# Patient Record
Sex: FEMALE | Race: WHITE | NOT HISPANIC OR LATINO | Employment: UNEMPLOYED | URBAN - NONMETROPOLITAN AREA
[De-identification: names, ages, dates, MRNs, and addresses within clinical notes are randomized per-mention and may not be internally consistent; named-entity substitution may affect disease eponyms.]

---

## 2017-02-08 ENCOUNTER — APPOINTMENT (OUTPATIENT)
Dept: GENERAL RADIOLOGY | Facility: HOSPITAL | Age: 32
End: 2017-02-08

## 2017-02-08 ENCOUNTER — HOSPITAL ENCOUNTER (EMERGENCY)
Facility: HOSPITAL | Age: 32
Discharge: HOME | End: 2017-02-08
Attending: EMERGENCY MEDICINE | Admitting: EMERGENCY MEDICINE

## 2017-02-08 VITALS
HEIGHT: 66 IN | HEART RATE: 77 BPM | SYSTOLIC BLOOD PRESSURE: 122 MMHG | TEMPERATURE: 98 F | RESPIRATION RATE: 18 BRPM | DIASTOLIC BLOOD PRESSURE: 81 MMHG | OXYGEN SATURATION: 99 % | BODY MASS INDEX: 28.93 KG/M2 | WEIGHT: 180 LBS

## 2017-02-08 DIAGNOSIS — S70.02XA CONTUSION OF LEFT HIP, INITIAL ENCOUNTER: Primary | ICD-10-CM

## 2017-02-08 DIAGNOSIS — S80.00XA CONTUSION OF KNEE, UNSPECIFIED LATERALITY, INITIAL ENCOUNTER: ICD-10-CM

## 2017-02-08 LAB — B-HCG UR QL: NEGATIVE

## 2017-02-08 PROCEDURE — 81025 URINE PREGNANCY TEST: CPT | Performed by: PHYSICIAN ASSISTANT

## 2017-02-08 PROCEDURE — 99283 EMERGENCY DEPT VISIT LOW MDM: CPT

## 2017-02-08 PROCEDURE — 73562 X-RAY EXAM OF KNEE 3: CPT

## 2017-02-08 PROCEDURE — 73562 X-RAY EXAM OF KNEE 3: CPT | Performed by: RADIOLOGY

## 2017-02-08 PROCEDURE — 73502 X-RAY EXAM HIP UNI 2-3 VIEWS: CPT

## 2017-02-08 PROCEDURE — 73502 X-RAY EXAM HIP UNI 2-3 VIEWS: CPT | Performed by: RADIOLOGY

## 2017-02-08 RX ORDER — NAPROXEN 500 MG/1
500 TABLET ORAL 2 TIMES DAILY PRN
Qty: 12 TABLET | Refills: 0 | Status: SHIPPED | OUTPATIENT
Start: 2017-02-08 | End: 2018-03-19

## 2017-02-08 NOTE — ED PROVIDER NOTES
Subjective   Patient is a 31 y.o. female presenting with lower extremity pain.   History provided by:  Patient  Lower Extremity Issue   Location:  Hip and knee  Time since incident:  1 hour  Injury: yes    Mechanism of injury: fall    Hip location:  L hip  Knee location:  L knee  Pain details:     Quality:  Dull    Radiates to:  Does not radiate    Severity:  Mild  Associated symptoms: no fever        Review of Systems   Constitutional: Negative for chills and fever.   HENT: Negative for congestion, ear pain and sore throat.    Respiratory: Negative for cough, shortness of breath and wheezing.    Cardiovascular: Negative for chest pain.   Gastrointestinal: Negative for diarrhea, nausea and vomiting.   Genitourinary: Negative for dysuria and flank pain.   Musculoskeletal: Positive for arthralgias.   Skin: Negative for rash.   Neurological: Negative for headaches.   Psychiatric/Behavioral: The patient is not nervous/anxious.    All other systems reviewed and are negative.      Past Medical History   Diagnosis Date   • Anxiety    • Depression    • Hepatitis C    • Myalgia        Allergies   Allergen Reactions   • Adhesive Tape      Steri Strip Adhesive Allergy    • Bactrim [Sulfamethoxazole-Trimethoprim] Hives, Itching and Swelling   • Codeine Hives, Itching and Swelling   • Keflex [Cephalexin] Hives, Itching and Swelling       Past Surgical History   Procedure Laterality Date   • Nerve surgery Right    • Cholecystectomy         No family history on file.    Social History     Social History   • Marital status: Single     Spouse name: N/A   • Number of children: N/A   • Years of education: N/A     Social History Main Topics   • Smoking status: Current Every Day Smoker     Packs/day: 1.00     Years: 15.00     Types: Cigarettes   • Smokeless tobacco: Not on file   • Alcohol use No   • Drug use: No   • Sexual activity: Not on file     Other Topics Concern   • Not on file     Social History Narrative           Objective    Physical Exam   Constitutional: She is oriented to person, place, and time. She appears well-developed and well-nourished.   HENT:   Head: Normocephalic.   Mouth/Throat: Oropharynx is clear and moist.   Neck: Neck supple.   Cardiovascular: Normal rate and regular rhythm.    Pulmonary/Chest: Effort normal and breath sounds normal.   Abdominal: Soft. Bowel sounds are normal. There is no tenderness.   Musculoskeletal:        Left hip: She exhibits decreased range of motion, decreased strength and tenderness. She exhibits no deformity.        Left knee: She exhibits decreased range of motion and swelling. She exhibits no deformity.   Neurological: She is alert and oriented to person, place, and time.   Skin: Skin is warm and dry.   Psychiatric: She has a normal mood and affect. Her behavior is normal. Judgment and thought content normal.   Nursing note and vitals reviewed.      Procedures         ED Course  ED Course                  MDM    Final diagnoses:   Contusion of left hip, initial encounter   Contusion of knee, unspecified laterality, initial encounter            EDWARD Price  02/09/17 0886

## 2017-02-26 ENCOUNTER — HOSPITAL ENCOUNTER (EMERGENCY)
Facility: HOSPITAL | Age: 32
Discharge: HOME | End: 2017-02-27
Attending: EMERGENCY MEDICINE | Admitting: EMERGENCY MEDICINE

## 2017-02-26 ENCOUNTER — APPOINTMENT (OUTPATIENT)
Dept: CT IMAGING | Facility: HOSPITAL | Age: 32
End: 2017-02-26

## 2017-02-26 DIAGNOSIS — S09.93XA FACIAL INJURY, INITIAL ENCOUNTER: Primary | ICD-10-CM

## 2017-02-26 DIAGNOSIS — S39.92XA LOWER BACK INJURY, INITIAL ENCOUNTER: ICD-10-CM

## 2017-02-26 PROCEDURE — 72131 CT LUMBAR SPINE W/O DYE: CPT

## 2017-02-26 PROCEDURE — 72131 CT LUMBAR SPINE W/O DYE: CPT | Performed by: RADIOLOGY

## 2017-02-26 PROCEDURE — 99283 EMERGENCY DEPT VISIT LOW MDM: CPT

## 2017-02-26 PROCEDURE — 70486 CT MAXILLOFACIAL W/O DYE: CPT

## 2017-02-26 PROCEDURE — 70486 CT MAXILLOFACIAL W/O DYE: CPT | Performed by: RADIOLOGY

## 2017-02-27 VITALS
RESPIRATION RATE: 16 BRPM | BODY MASS INDEX: 32.14 KG/M2 | HEIGHT: 66 IN | TEMPERATURE: 97.5 F | SYSTOLIC BLOOD PRESSURE: 108 MMHG | HEART RATE: 70 BPM | DIASTOLIC BLOOD PRESSURE: 74 MMHG | OXYGEN SATURATION: 99 % | WEIGHT: 200 LBS

## 2017-03-18 ENCOUNTER — APPOINTMENT (OUTPATIENT)
Dept: CT IMAGING | Facility: HOSPITAL | Age: 32
End: 2017-03-18

## 2017-03-18 ENCOUNTER — HOSPITAL ENCOUNTER (EMERGENCY)
Facility: HOSPITAL | Age: 32
Discharge: HOME | End: 2017-03-18
Attending: EMERGENCY MEDICINE | Admitting: EMERGENCY MEDICINE

## 2017-03-18 VITALS
HEIGHT: 67 IN | RESPIRATION RATE: 18 BRPM | TEMPERATURE: 98.2 F | DIASTOLIC BLOOD PRESSURE: 80 MMHG | WEIGHT: 180 LBS | BODY MASS INDEX: 28.25 KG/M2 | SYSTOLIC BLOOD PRESSURE: 124 MMHG | HEART RATE: 90 BPM | OXYGEN SATURATION: 98 %

## 2017-03-18 DIAGNOSIS — M54.6 ACUTE MIDLINE THORACIC BACK PAIN: Primary | ICD-10-CM

## 2017-03-18 LAB — B-HCG UR QL: NEGATIVE

## 2017-03-18 PROCEDURE — 99283 EMERGENCY DEPT VISIT LOW MDM: CPT

## 2017-03-18 PROCEDURE — 81025 URINE PREGNANCY TEST: CPT | Performed by: PHYSICIAN ASSISTANT

## 2017-03-18 PROCEDURE — 72125 CT NECK SPINE W/O DYE: CPT

## 2017-03-18 PROCEDURE — 72125 CT NECK SPINE W/O DYE: CPT | Performed by: RADIOLOGY

## 2017-03-18 PROCEDURE — 72128 CT CHEST SPINE W/O DYE: CPT

## 2017-03-18 PROCEDURE — 72128 CT CHEST SPINE W/O DYE: CPT | Performed by: RADIOLOGY

## 2017-03-18 RX ORDER — IBUPROFEN 400 MG/1
800 TABLET ORAL ONCE
Status: COMPLETED | OUTPATIENT
Start: 2017-03-18 | End: 2017-03-18

## 2017-03-18 RX ADMIN — IBUPROFEN 800 MG: 400 TABLET ORAL at 03:10

## 2017-03-18 NOTE — ED PROVIDER NOTES
Subjective   Patient is a 31 y.o. female presenting with back pain.   History provided by:  Patient   used: No    Back Pain   Location:  Thoracic spine  Quality:  Stabbing  Radiates to:  Does not radiate  Pain severity:  Moderate  Onset quality:  Sudden  Duration:  1 day  Timing:  Constant  Progression:  Worsening  Chronicity:  New  Context: falling    Context comment:  Patient stated she fell getting out of the bath tub earlier and landed on the tub with her T spine. Reports she has a known fracture of the T spine.  Patient stated she tried to sleep off the pain but woke up hurting worse.   Relieved by:  Nothing  Worsened by:  Nothing  Ineffective treatments:  None tried  Associated symptoms: no abdominal pain, no abdominal swelling, no bladder incontinence, no bowel incontinence, no chest pain, no dysuria, no fever, no headaches, no leg pain, no numbness, no paresthesias, no pelvic pain, no perianal numbness, no tingling, no weakness and no weight loss    Risk factors: no hx of cancer, no hx of osteoporosis, no lack of exercise, no menopause, not obese, not pregnant, no recent surgery, no steroid use and no vascular disease        Review of Systems   Constitutional: Negative.  Negative for fever and weight loss.   HENT: Negative.    Eyes: Negative.    Respiratory: Negative.    Cardiovascular: Negative.  Negative for chest pain.   Gastrointestinal: Negative.  Negative for abdominal pain and bowel incontinence.   Endocrine: Negative.    Genitourinary: Negative.  Negative for bladder incontinence, dysuria and pelvic pain.   Musculoskeletal: Positive for back pain.   Skin: Negative.    Allergic/Immunologic: Negative.    Neurological: Negative.  Negative for tingling, weakness, numbness, headaches and paresthesias.   Hematological: Negative.    Psychiatric/Behavioral: Negative.    All other systems reviewed and are negative.      Past Medical History   Diagnosis Date   • Anxiety    • Depression    •  Hepatitis C    • Myalgia        Allergies   Allergen Reactions   • Adhesive Tape      Steri Strip Adhesive Allergy    • Bactrim [Sulfamethoxazole-Trimethoprim] Hives, Itching and Swelling   • Codeine Hives, Itching and Swelling   • Keflex [Cephalexin] Hives, Itching and Swelling       Past Surgical History   Procedure Laterality Date   • Nerve surgery Right    • Cholecystectomy         History reviewed. No pertinent family history.    Social History     Social History   • Marital status: Single     Spouse name: N/A   • Number of children: N/A   • Years of education: N/A     Social History Main Topics   • Smoking status: Current Every Day Smoker     Packs/day: 1.00     Years: 15.00     Types: Cigarettes   • Smokeless tobacco: None   • Alcohol use No   • Drug use: No   • Sexual activity: Defer     Other Topics Concern   • None     Social History Narrative   • None           Objective   Physical Exam   Constitutional: She is oriented to person, place, and time. She appears well-developed and well-nourished.   HENT:   Head: Normocephalic and atraumatic.   Right Ear: External ear normal.   Left Ear: External ear normal.   Nose: Nose normal.   Mouth/Throat: Oropharynx is clear and moist.   Eyes: EOM are normal. Pupils are equal, round, and reactive to light.   Neck: Normal range of motion. Neck supple.   Cardiovascular: Normal rate, regular rhythm, normal heart sounds and intact distal pulses.    Pulmonary/Chest: Effort normal and breath sounds normal.   Abdominal: Soft. Bowel sounds are normal.   Musculoskeletal: Normal range of motion.        Cervical back: She exhibits normal range of motion, no tenderness, no bony tenderness, no swelling, no edema, no deformity, no laceration, no pain, no spasm and normal pulse.        Thoracic back: She exhibits tenderness. She exhibits normal range of motion, no bony tenderness, no swelling, no edema, no deformity, no laceration, no pain, no spasm and normal pulse.        Lumbar  back: She exhibits normal range of motion, no tenderness, no bony tenderness, no swelling, no edema, no deformity, no laceration, no pain, no spasm and normal pulse.   Neurological: She is alert and oriented to person, place, and time.   Skin: Skin is warm and dry.   Psychiatric: She has a normal mood and affect. Her behavior is normal. Judgment and thought content normal.   Nursing note and vitals reviewed.      Procedures         ED Course  ED Course                  MDM    Final diagnoses:   Acute midline thoracic back pain            EDWARD Wang  03/18/17 0413

## 2017-03-18 NOTE — ED NOTES
Patient states that she has a fracture of T 11. States that she fell in the shower yesterday afternoon and her back has hurt every since. Patient is ambulatory into room. Patient has no overt deficiencies noted. Resps even and unlabored. ALENA. THEODORA.      Keesha Beauchamp RN  03/18/17 0426

## 2017-03-18 NOTE — ED NOTES
Patient ambulatory in room. States that she has spoken with the PA and aware that she is up for discharge. Patient requesting disc and copy of CT report. Explained to patient that she would have to come to medical records in 48 hours to obtain these records. Patient states that the pain is tolerable at this time.      Keesha Beauchamp RN  03/18/17 0429

## 2017-06-07 ENCOUNTER — HOSPITAL ENCOUNTER (OUTPATIENT)
Dept: GENERAL RADIOLOGY | Facility: HOSPITAL | Age: 32
Discharge: HOME | End: 2017-06-07
Admitting: FAMILY MEDICINE

## 2017-06-07 ENCOUNTER — HOSPITAL ENCOUNTER (OUTPATIENT)
Dept: GENERAL RADIOLOGY | Facility: HOSPITAL | Age: 32
Discharge: HOME | End: 2017-06-07

## 2017-06-07 ENCOUNTER — TRANSCRIBE ORDERS (OUTPATIENT)
Dept: ADMINISTRATIVE | Facility: HOSPITAL | Age: 32
End: 2017-06-07

## 2017-06-07 DIAGNOSIS — M54.9 BACKACHE, UNSPECIFIED: ICD-10-CM

## 2017-06-07 DIAGNOSIS — M54.9 BACKACHE, UNSPECIFIED: Primary | ICD-10-CM

## 2017-06-07 PROCEDURE — 72114 X-RAY EXAM L-S SPINE BENDING: CPT | Performed by: RADIOLOGY

## 2017-06-07 PROCEDURE — 72074 X-RAY EXAM THORAC SPINE4/>VW: CPT

## 2017-06-07 PROCEDURE — 72114 X-RAY EXAM L-S SPINE BENDING: CPT

## 2017-06-07 PROCEDURE — 72072 X-RAY EXAM THORAC SPINE 3VWS: CPT | Performed by: RADIOLOGY

## 2017-07-03 ENCOUNTER — TRANSCRIBE ORDERS (OUTPATIENT)
Dept: ADMINISTRATIVE | Facility: HOSPITAL | Age: 32
End: 2017-07-03

## 2017-07-03 DIAGNOSIS — R00.1 BRADYCARDIA: Primary | ICD-10-CM

## 2017-07-07 ENCOUNTER — HOSPITAL ENCOUNTER (OUTPATIENT)
Dept: CARDIOLOGY | Facility: HOSPITAL | Age: 32
Discharge: HOME | End: 2017-07-07
Admitting: FAMILY MEDICINE

## 2017-07-07 DIAGNOSIS — R00.1 BRADYCARDIA: ICD-10-CM

## 2017-07-07 PROCEDURE — 93306 TTE W/DOPPLER COMPLETE: CPT

## 2017-07-07 PROCEDURE — 93306 TTE W/DOPPLER COMPLETE: CPT | Performed by: INTERNAL MEDICINE

## 2017-07-09 LAB
BH CV ECHO MEAS - ACS: 1.8 CM
BH CV ECHO MEAS - AO ROOT AREA (BSA CORRECTED): 1.2
BH CV ECHO MEAS - AO ROOT AREA: 4.5 CM^2
BH CV ECHO MEAS - AO ROOT DIAM: 2.4 CM
BH CV ECHO MEAS - BSA(HAYCOCK): 2 M^2
BH CV ECHO MEAS - BSA: 1.9 M^2
BH CV ECHO MEAS - BZI_BMI: 29.1 KILOGRAMS/M^2
BH CV ECHO MEAS - BZI_METRIC_HEIGHT: 167.6 CM
BH CV ECHO MEAS - BZI_METRIC_WEIGHT: 81.6 KG
BH CV ECHO MEAS - CONTRAST EF 4CH: 61 ML/M^2
BH CV ECHO MEAS - EDV(CUBED): 138.2 ML
BH CV ECHO MEAS - EDV(MOD-SP4): 123 ML
BH CV ECHO MEAS - EDV(TEICH): 127.8 ML
BH CV ECHO MEAS - EF(CUBED): 68.4 %
BH CV ECHO MEAS - EF(MOD-SP4): 61 %
BH CV ECHO MEAS - EF(TEICH): 59.6 %
BH CV ECHO MEAS - ESV(CUBED): 43.6 ML
BH CV ECHO MEAS - ESV(MOD-SP4): 48 ML
BH CV ECHO MEAS - ESV(TEICH): 51.6 ML
BH CV ECHO MEAS - FS: 31.9 %
BH CV ECHO MEAS - IVS/LVPW: 1.1
BH CV ECHO MEAS - IVSD: 1 CM
BH CV ECHO MEAS - LA DIMENSION: 4.3 CM
BH CV ECHO MEAS - LA/AO: 1.8
BH CV ECHO MEAS - LV DIASTOLIC VOL/BSA (35-75): 64.3 ML/M^2
BH CV ECHO MEAS - LV MASS(C)D: 185.1 GRAMS
BH CV ECHO MEAS - LV MASS(C)DI: 96.8 GRAMS/M^2
BH CV ECHO MEAS - LV SYSTOLIC VOL/BSA (12-30): 25.1 ML/M^2
BH CV ECHO MEAS - LVIDD: 5.2 CM
BH CV ECHO MEAS - LVIDS: 3.5 CM
BH CV ECHO MEAS - LVLD AP4: 8 CM
BH CV ECHO MEAS - LVLS AP4: 6.7 CM
BH CV ECHO MEAS - LVOT AREA (M): 3.1 CM^2
BH CV ECHO MEAS - LVOT AREA: 3.3 CM^2
BH CV ECHO MEAS - LVOT DIAM: 2 CM
BH CV ECHO MEAS - LVPWD: 0.92 CM
BH CV ECHO MEAS - MV A MAX VEL: 30.1 CM/SEC
BH CV ECHO MEAS - MV E MAX VEL: 75.5 CM/SEC
BH CV ECHO MEAS - MV E/A: 2.5
BH CV ECHO MEAS - PA ACC SLOPE: 410.4 CM/SEC^2
BH CV ECHO MEAS - PA ACC TIME: 0.19 SEC
BH CV ECHO MEAS - PA PR(ACCEL): -8.1 MMHG
BH CV ECHO MEAS - RAP SYSTOLE: 10 MMHG
BH CV ECHO MEAS - RVSP: 44.4 MMHG
BH CV ECHO MEAS - SI(CUBED): 49.4 ML/M^2
BH CV ECHO MEAS - SI(MOD-SP4): 39.2 ML/M^2
BH CV ECHO MEAS - SI(TEICH): 39.8 ML/M^2
BH CV ECHO MEAS - SV(CUBED): 94.6 ML
BH CV ECHO MEAS - SV(MOD-SP4): 75 ML
BH CV ECHO MEAS - SV(TEICH): 76.2 ML
BH CV ECHO MEAS - TR MAX VEL: 293.2 CM/SEC

## 2017-09-27 PROBLEM — R93.2 ABNORMAL LIVER DIAGNOSTIC IMAGING: Status: ACTIVE | Noted: 2017-09-27

## 2017-09-27 PROBLEM — B19.20 HCV (HEPATITIS C VIRUS): Status: ACTIVE | Noted: 2017-09-27

## 2017-09-27 PROBLEM — G43.009 ATYPICAL MIGRAINE: Status: ACTIVE | Noted: 2017-09-27

## 2018-03-05 ENCOUNTER — APPOINTMENT (OUTPATIENT)
Dept: GENERAL RADIOLOGY | Facility: HOSPITAL | Age: 33
End: 2018-03-05

## 2018-03-05 ENCOUNTER — HOSPITAL ENCOUNTER (EMERGENCY)
Facility: HOSPITAL | Age: 33
Discharge: HOME | End: 2018-03-05
Attending: EMERGENCY MEDICINE | Admitting: EMERGENCY MEDICINE

## 2018-03-05 VITALS
SYSTOLIC BLOOD PRESSURE: 116 MMHG | RESPIRATION RATE: 18 BRPM | WEIGHT: 180 LBS | TEMPERATURE: 98.2 F | HEART RATE: 88 BPM | BODY MASS INDEX: 28.93 KG/M2 | OXYGEN SATURATION: 95 % | DIASTOLIC BLOOD PRESSURE: 62 MMHG | HEIGHT: 66 IN

## 2018-03-05 DIAGNOSIS — J20.9 ACUTE BRONCHITIS, UNSPECIFIED ORGANISM: ICD-10-CM

## 2018-03-05 DIAGNOSIS — J01.00 ACUTE NON-RECURRENT MAXILLARY SINUSITIS: Primary | ICD-10-CM

## 2018-03-05 PROCEDURE — 63710000001 PREDNISONE PER 1 MG: Performed by: NURSE PRACTITIONER

## 2018-03-05 PROCEDURE — 99283 EMERGENCY DEPT VISIT LOW MDM: CPT

## 2018-03-05 PROCEDURE — 71046 X-RAY EXAM CHEST 2 VIEWS: CPT | Performed by: RADIOLOGY

## 2018-03-05 PROCEDURE — 94799 UNLISTED PULMONARY SVC/PX: CPT

## 2018-03-05 PROCEDURE — 94640 AIRWAY INHALATION TREATMENT: CPT

## 2018-03-05 PROCEDURE — 71046 X-RAY EXAM CHEST 2 VIEWS: CPT

## 2018-03-05 RX ORDER — AMOXICILLIN AND CLAVULANATE POTASSIUM 875; 125 MG/1; MG/1
1 TABLET, FILM COATED ORAL ONCE
Status: COMPLETED | OUTPATIENT
Start: 2018-03-05 | End: 2018-03-05

## 2018-03-05 RX ORDER — PREDNISONE 20 MG/1
20 TABLET ORAL 2 TIMES DAILY
Qty: 10 TABLET | Refills: 0 | Status: SHIPPED | OUTPATIENT
Start: 2018-03-05 | End: 2018-03-10

## 2018-03-05 RX ORDER — IPRATROPIUM BROMIDE AND ALBUTEROL SULFATE 2.5; .5 MG/3ML; MG/3ML
3 SOLUTION RESPIRATORY (INHALATION) ONCE
Status: COMPLETED | OUTPATIENT
Start: 2018-03-05 | End: 2018-03-05

## 2018-03-05 RX ORDER — AMOXICILLIN AND CLAVULANATE POTASSIUM 875; 125 MG/1; MG/1
1 TABLET, FILM COATED ORAL 2 TIMES DAILY
Qty: 20 TABLET | Refills: 0 | Status: SHIPPED | OUTPATIENT
Start: 2018-03-05 | End: 2018-03-15

## 2018-03-05 RX ORDER — FLUVOXAMINE MALEATE 50 MG/1
100 TABLET, COATED ORAL 3 TIMES DAILY
COMMUNITY
End: 2018-03-19

## 2018-03-05 RX ORDER — PREDNISONE 20 MG/1
20 TABLET ORAL ONCE
Status: COMPLETED | OUTPATIENT
Start: 2018-03-05 | End: 2018-03-05

## 2018-03-05 RX ADMIN — PREDNISONE 20 MG: 20 TABLET ORAL at 08:10

## 2018-03-05 RX ADMIN — IPRATROPIUM BROMIDE AND ALBUTEROL SULFATE 3 ML: .5; 3 SOLUTION RESPIRATORY (INHALATION) at 07:38

## 2018-03-05 RX ADMIN — AMOXICILLIN AND CLAVULANATE POTASSIUM 1 TABLET: 875; 125 TABLET, FILM COATED ORAL at 08:10

## 2018-03-05 NOTE — DISCHARGE INSTRUCTIONS
Acute Bronchitis, Adult  Acute bronchitis is when air tubes (bronchi) in the lungs suddenly get swollen. The condition can make it hard to breathe. It can also cause these symptoms:  · A cough.  · Coughing up clear, yellow, or green mucus.  · Wheezing.  · Chest congestion.  · Shortness of breath.  · A fever.  · Body aches.  · Chills.  · A sore throat.  Follow these instructions at home:  Medicines   · Take over-the-counter and prescription medicines only as told by your doctor.  · If you were prescribed an antibiotic medicine, take it as told by your doctor. Do not stop taking the antibiotic even if you start to feel better.  General instructions   · Rest.  · Drink enough fluids to keep your pee (urine) clear or pale yellow.  · Avoid smoking and secondhand smoke. If you smoke and you need help quitting, ask your doctor. Quitting will help your lungs heal faster.  · Use an inhaler, cool mist vaporizer, or humidifier as told by your doctor.  · Keep all follow-up visits as told by your doctor. This is important.  How is this prevented?  To lower your risk of getting this condition again:  · Wash your hands often with soap and water. If you cannot use soap and water, use hand .  · Avoid contact with people who have cold symptoms.  · Try not to touch your hands to your mouth, nose, or eyes.  · Make sure to get the flu shot every year.  Contact a doctor if:  · Your symptoms do not get better in 2 weeks.  Get help right away if:  · You cough up blood.  · You have chest pain.  · You have very bad shortness of breath.  · You become dehydrated.  · You faint (pass out) or keep feeling like you are going to pass out.  · You keep throwing up (vomiting).  · You have a very bad headache.  · Your fever or chills gets worse.  This information is not intended to replace advice given to you by your health care provider. Make sure you discuss any questions you have with your health care provider.  Document Released: 06/05/2009  Document Revised: 07/26/2017 Document Reviewed: 06/07/2017  [employer] Interactive Patient Education © 2017 [employer]

## 2018-03-05 NOTE — ED PROVIDER NOTES
Subjective   Patient is a 32 y.o. female presenting with cough.   History provided by:  Patient   used: No    Cough   Cough characteristics:  Productive and hacking  Sputum characteristics:  Green  Severity:  Moderate  Onset quality:  Gradual  Duration:  5 days  Timing:  Intermittent  Progression:  Waxing and waning  Chronicity:  New  Smoker: yes    Context: sick contacts, upper respiratory infection and weather changes    Context: not animal exposure    Relieved by:  Nothing  Worsened by:  Nothing  Ineffective treatments:  None tried  Associated symptoms: shortness of breath, sinus congestion, sore throat and wheezing    Associated symptoms: no chest pain, no chills and no fever    Risk factors: no chemical exposure, no recent infection and no recent travel        Review of Systems   Constitutional: Negative.  Negative for chills and fever.   HENT: Positive for sore throat.    Respiratory: Positive for cough, shortness of breath and wheezing.    Cardiovascular: Negative.  Negative for chest pain.   Gastrointestinal: Negative.  Negative for abdominal pain.   Endocrine: Negative.    Genitourinary: Negative.  Negative for dysuria.   Skin: Negative.    Neurological: Negative.    Psychiatric/Behavioral: Negative.    All other systems reviewed and are negative.      Past Medical History:   Diagnosis Date   • Bradycardia    • Fibromyalgia    • Heart disease    • Hepatitis C    • Myalgia        Allergies   Allergen Reactions   • Adhesive Tape      Steri Strip Adhesive Allergy    • Bactrim [Sulfamethoxazole-Trimethoprim] Hives, Itching and Swelling   • Codeine Hives, Itching and Swelling   • Keflex [Cephalexin] Hives, Itching and Swelling       Past Surgical History:   Procedure Laterality Date   • CHOLECYSTECTOMY     • NERVE SURGERY Right        History reviewed. No pertinent family history.    Social History     Social History   • Marital status: Single     Social History Main Topics   • Smoking  status: Current Every Day Smoker     Packs/day: 1.00     Years: 15.00     Types: Cigarettes   • Alcohol use No   • Drug use: No   • Sexual activity: Defer           Objective   Physical Exam   Constitutional: She is oriented to person, place, and time. She appears well-developed and well-nourished.   HENT:   Head: Normocephalic and atraumatic.   Nose: Nose normal.   Maxillary sinus tenderness   Eyes: EOM are normal. Pupils are equal, round, and reactive to light.   Neck: Normal range of motion. Neck supple.   Cardiovascular: Normal rate, regular rhythm, normal heart sounds and intact distal pulses.    Pulmonary/Chest: Effort normal. No respiratory distress. She has wheezes.   Abdominal: Soft. Bowel sounds are normal.   Musculoskeletal: Normal range of motion.   Neurological: She is alert and oriented to person, place, and time.   Skin: Skin is warm and dry.   Psychiatric: She has a normal mood and affect. Her behavior is normal. Judgment and thought content normal.   Nursing note and vitals reviewed.      Procedures         ED Course  ED Course                  MDM  Number of Diagnoses or Management Options  Acute bronchitis, unspecified organism: new and requires workup  Acute non-recurrent maxillary sinusitis: new and requires workup     Amount and/or Complexity of Data Reviewed  Tests in the radiology section of CPT®: reviewed and ordered    Risk of Complications, Morbidity, and/or Mortality  Presenting problems: moderate  Diagnostic procedures: moderate  Management options: moderate    Patient Progress  Patient progress: improved      Final diagnoses:   Acute non-recurrent maxillary sinusitis   Acute bronchitis, unspecified organism            Mary Mclean, APRN  03/05/18 0826

## 2018-03-08 ENCOUNTER — HOSPITAL ENCOUNTER (EMERGENCY)
Facility: HOSPITAL | Age: 33
Discharge: LEFT BEFORE DISCHARGE | End: 2018-03-08
Attending: EMERGENCY MEDICINE | Admitting: EMERGENCY MEDICINE

## 2018-03-08 ENCOUNTER — APPOINTMENT (OUTPATIENT)
Dept: CT IMAGING | Facility: HOSPITAL | Age: 33
End: 2018-03-08

## 2018-03-08 VITALS
DIASTOLIC BLOOD PRESSURE: 78 MMHG | HEART RATE: 103 BPM | RESPIRATION RATE: 18 BRPM | HEIGHT: 66 IN | TEMPERATURE: 97.6 F | BODY MASS INDEX: 28.93 KG/M2 | OXYGEN SATURATION: 95 % | WEIGHT: 180 LBS | SYSTOLIC BLOOD PRESSURE: 117 MMHG

## 2018-03-08 DIAGNOSIS — F32.A DEPRESSION, UNSPECIFIED DEPRESSION TYPE: Primary | ICD-10-CM

## 2018-03-08 DIAGNOSIS — S61.451A BITE WOUND OF RIGHT HAND, INITIAL ENCOUNTER: ICD-10-CM

## 2018-03-08 LAB
6-ACETYL MORPHINE: NEGATIVE
ALBUMIN SERPL-MCNC: 4.6 G/DL (ref 3.5–5)
ALBUMIN/GLOB SERPL: 1.4 G/DL (ref 1.5–2.5)
ALP SERPL-CCNC: 76 U/L (ref 35–104)
ALT SERPL W P-5'-P-CCNC: 49 U/L (ref 10–36)
AMPHET+METHAMPHET UR QL: NEGATIVE
ANION GAP SERPL CALCULATED.3IONS-SCNC: 8.6 MMOL/L (ref 3.6–11.2)
AST SERPL-CCNC: 40 U/L (ref 10–30)
B-HCG UR QL: NEGATIVE
BARBITURATES UR QL SCN: NEGATIVE
BASOPHILS # BLD AUTO: 0.07 10*3/MM3 (ref 0–0.3)
BASOPHILS NFR BLD AUTO: 0.8 % (ref 0–2)
BENZODIAZ UR QL SCN: NEGATIVE
BILIRUB SERPL-MCNC: 0.1 MG/DL (ref 0.2–1.8)
BILIRUB UR QL STRIP: NEGATIVE
BUN BLD-MCNC: 7 MG/DL (ref 7–21)
BUN/CREAT SERPL: 8.8 (ref 7–25)
BUPRENORPHINE SERPL-MCNC: NEGATIVE NG/ML
CALCIUM SPEC-SCNC: 9.7 MG/DL (ref 7.7–10)
CANNABINOIDS SERPL QL: NEGATIVE
CHLORIDE SERPL-SCNC: 108 MMOL/L (ref 99–112)
CLARITY UR: CLEAR
CO2 SERPL-SCNC: 26.4 MMOL/L (ref 24.3–31.9)
COCAINE UR QL: NEGATIVE
COLOR UR: YELLOW
CREAT BLD-MCNC: 0.8 MG/DL (ref 0.43–1.29)
DEPRECATED RDW RBC AUTO: 42.1 FL (ref 37–54)
EOSINOPHIL # BLD AUTO: 0.42 10*3/MM3 (ref 0–0.7)
EOSINOPHIL NFR BLD AUTO: 4.9 % (ref 0–5)
ERYTHROCYTE [DISTWIDTH] IN BLOOD BY AUTOMATED COUNT: 12.9 % (ref 11.5–14.5)
ETHANOL BLD-MCNC: 195 MG/DL
ETHANOL UR QL: 0.2 %
GFR SERPL CREATININE-BSD FRML MDRD: 84 ML/MIN/1.73
GLOBULIN UR ELPH-MCNC: 3.2 GM/DL
GLUCOSE BLD-MCNC: 112 MG/DL (ref 70–110)
GLUCOSE UR STRIP-MCNC: NEGATIVE MG/DL
HCT VFR BLD AUTO: 42.8 % (ref 37–47)
HGB BLD-MCNC: 14.1 G/DL (ref 12–16)
HGB UR QL STRIP.AUTO: NEGATIVE
IMM GRANULOCYTES # BLD: 0.03 10*3/MM3 (ref 0–0.03)
IMM GRANULOCYTES NFR BLD: 0.4 % (ref 0–0.5)
KETONES UR QL STRIP: NEGATIVE
LEUKOCYTE ESTERASE UR QL STRIP.AUTO: NEGATIVE
LYMPHOCYTES # BLD AUTO: 2.33 10*3/MM3 (ref 1–3)
LYMPHOCYTES NFR BLD AUTO: 27.4 % (ref 21–51)
MAGNESIUM SERPL-MCNC: 2.1 MG/DL (ref 1.7–2.6)
MCH RBC QN AUTO: 29.7 PG (ref 27–33)
MCHC RBC AUTO-ENTMCNC: 32.9 G/DL (ref 33–37)
MCV RBC AUTO: 90.3 FL (ref 80–94)
METHADONE UR QL SCN: NEGATIVE
MONOCYTES # BLD AUTO: 0.47 10*3/MM3 (ref 0.1–0.9)
MONOCYTES NFR BLD AUTO: 5.5 % (ref 0–10)
NEUTROPHILS # BLD AUTO: 5.18 10*3/MM3 (ref 1.4–6.5)
NEUTROPHILS NFR BLD AUTO: 61 % (ref 30–70)
NITRITE UR QL STRIP: NEGATIVE
OPIATES UR QL: NEGATIVE
OSMOLALITY SERPL CALC.SUM OF ELEC: 283.7 MOSM/KG (ref 273–305)
OXYCODONE UR QL SCN: NEGATIVE
PCP UR QL SCN: NEGATIVE
PH UR STRIP.AUTO: 6.5 [PH] (ref 5–8)
PLATELET # BLD AUTO: 284 10*3/MM3 (ref 130–400)
PMV BLD AUTO: 8.6 FL (ref 6–10)
POTASSIUM BLD-SCNC: 4.3 MMOL/L (ref 3.5–5.3)
PROT SERPL-MCNC: 7.8 G/DL (ref 6–8)
PROT UR QL STRIP: NEGATIVE
RBC # BLD AUTO: 4.74 10*6/MM3 (ref 4.2–5.4)
SODIUM BLD-SCNC: 143 MMOL/L (ref 135–153)
SP GR UR STRIP: <=1.005 (ref 1–1.03)
UROBILINOGEN UR QL STRIP: NORMAL
WBC NRBC COR # BLD: 8.5 10*3/MM3 (ref 4.5–12.5)

## 2018-03-08 PROCEDURE — 80307 DRUG TEST PRSMV CHEM ANLYZR: CPT | Performed by: EMERGENCY MEDICINE

## 2018-03-08 PROCEDURE — 99283 EMERGENCY DEPT VISIT LOW MDM: CPT

## 2018-03-08 PROCEDURE — 83735 ASSAY OF MAGNESIUM: CPT | Performed by: NURSE PRACTITIONER

## 2018-03-08 PROCEDURE — 36415 COLL VENOUS BLD VENIPUNCTURE: CPT

## 2018-03-08 PROCEDURE — 81003 URINALYSIS AUTO W/O SCOPE: CPT | Performed by: EMERGENCY MEDICINE

## 2018-03-08 PROCEDURE — 81025 URINE PREGNANCY TEST: CPT | Performed by: EMERGENCY MEDICINE

## 2018-03-08 PROCEDURE — 80053 COMPREHEN METABOLIC PANEL: CPT | Performed by: EMERGENCY MEDICINE

## 2018-03-08 PROCEDURE — 85025 COMPLETE CBC W/AUTO DIFF WBC: CPT | Performed by: EMERGENCY MEDICINE

## 2018-03-08 RX ORDER — AMOXICILLIN AND CLAVULANATE POTASSIUM 875; 125 MG/1; MG/1
1 TABLET, FILM COATED ORAL ONCE
Status: DISCONTINUED | OUTPATIENT
Start: 2018-03-08 | End: 2018-03-08 | Stop reason: HOSPADM

## 2018-03-08 RX ORDER — SODIUM CHLORIDE 0.9 % (FLUSH) 0.9 %
10 SYRINGE (ML) INJECTION AS NEEDED
Status: DISCONTINUED | OUTPATIENT
Start: 2018-03-08 | End: 2018-03-08
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
## 2018-03-08 NOTE — ED NOTES
Walked patient out to sister car who was upset related to patient wanting to leave AMA. Spoke with patient and sister in relation to follow up with PCP or return to ED if headache persists, nausea or vomiting. Patient becomes lethargic. Also discussed follow up with patient psychiatrist. Patient sister signed AMA paper to take responsibility. Noted to be visually upset related to patient leaving.       Keesha Beauchamp RN  03/08/18 0237       Keesha Beauchamp RN  03/08/18 0359
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
## 2018-03-08 NOTE — ED NOTES
Called and spoke with NP at this time, who states that patient needs to speak to psych access related to self harm. Made aware that patient declines being suicidal, alert and oriented X 4. Resps even and unlabored. PERRLA. States that her head hurts some, but reports that she does want to speak to anyone here, states that she has her own psychiatrist. NP states that patient needs to speak to access here.      Keesha Beauchamp RN  03/08/18 0235
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
## 2018-03-19 ENCOUNTER — OFFICE VISIT (OUTPATIENT)
Dept: NEUROSURGERY | Facility: CLINIC | Age: 33
End: 2018-03-19

## 2018-03-19 VITALS
WEIGHT: 198.4 LBS | SYSTOLIC BLOOD PRESSURE: 120 MMHG | BODY MASS INDEX: 31.88 KG/M2 | DIASTOLIC BLOOD PRESSURE: 82 MMHG | OXYGEN SATURATION: 96 % | HEART RATE: 86 BPM | TEMPERATURE: 99.4 F | HEIGHT: 66 IN

## 2018-03-19 DIAGNOSIS — G89.29 CHRONIC BILATERAL LOW BACK PAIN WITHOUT SCIATICA: Primary | ICD-10-CM

## 2018-03-19 DIAGNOSIS — M47.817 LUMBOSACRAL SPONDYLOSIS WITHOUT MYELOPATHY: ICD-10-CM

## 2018-03-19 DIAGNOSIS — M41.20 SCOLIOSIS (AND KYPHOSCOLIOSIS), IDIOPATHIC: ICD-10-CM

## 2018-03-19 DIAGNOSIS — M54.50 CHRONIC BILATERAL LOW BACK PAIN WITHOUT SCIATICA: Primary | ICD-10-CM

## 2018-03-19 PROCEDURE — 99243 OFF/OP CNSLTJ NEW/EST LOW 30: CPT | Performed by: NEUROLOGICAL SURGERY

## 2018-03-19 RX ORDER — IBUPROFEN 200 MG
200 TABLET ORAL EVERY 6 HOURS PRN
COMMUNITY
End: 2020-10-15

## 2018-03-19 RX ORDER — GABAPENTIN 300 MG/1
CAPSULE ORAL 2 TIMES DAILY
Status: ON HOLD | COMMUNITY
Start: 2018-02-28 | End: 2022-09-09

## 2018-03-19 RX ORDER — ARIPIPRAZOLE 10 MG/1
TABLET ORAL
Status: ON HOLD | COMMUNITY
Start: 2018-03-13 | End: 2022-09-09

## 2018-03-19 RX ORDER — CLONAZEPAM 0.5 MG/1
TABLET ORAL
Status: ON HOLD | COMMUNITY
Start: 2018-03-13 | End: 2022-09-09

## 2018-03-19 NOTE — PROGRESS NOTES
Patient: Lillian Ayon  :  1985  Chart #:  [id number]    Date of Service: 18    Chief Complaint:   Chief Complaint   Patient presents with   • Back Pain       Back Pain   This is a new (Ms. Ayon is new with me today.  She is a 32 year old female who presents today with cervical and back pain.) problem. Episode onset: She was a previous patient of mine from  as she had a right lateral femoral cutaneous nerve transection. Episode frequency: She has more sensation in her right lower extremity than the left.  She has pain when her lower extremities are raised.  The problem has been gradually worsening since onset. The pain is present in the lumbar spine. The quality of the pain is described as aching. Radiates to: She has pain in her left lower extremity. The pain is at a severity of 4/10. The pain is mild (Her pain is mild to moderate.). The pain is worse during the day. The symptoms are aggravated by bending, position and standing. Stiffness is present in the morning. Associated symptoms include headaches and numbness. (She has a long history of back pain from neck to sacrum;  She has never had spine surgery;  ) Risk factors include lack of exercise and sedentary lifestyle. She has tried bed rest, NSAIDs and heat (she has been referred to PT but has not started;  she takes OTC ibuprofen and gabapentin.  she has a TENS unit.) for the symptoms. The treatment provided mild relief.       Radiographic Images:   X-ray of the lumbar spine dated 17 shows she has a sacralized L5 vertebra with 4 non-rib bearing vertebrae.     She has rudimentary ribs at T12.  There is a slight scoliosis concave to the right.  The bones are healthy and not fractures are seen.    Past Medical History:   Diagnosis Date   • Arthritis    • Bradycardia    • Fibromyalgia    • Heart disease    • Hepatitis C    • Myalgia      Current Outpatient Prescriptions   Medication Sig Dispense Refill   • ARIPiprazole (ABILIFY) 10 MG  tablet      • clonazePAM (KlonoPIN) 0.5 MG tablet      • fluconazole (DIFLUCAN) 200 MG tablet Take 1 tablet by mouth daily. 3 tablet 0   • gabapentin (NEURONTIN) 800 MG tablet      • ibuprofen (ADVIL,MOTRIN) 200 MG tablet Take 200 mg by mouth Every 6 (Six) Hours As Needed for Mild Pain .     • sertraline (ZOLOFT) 50 MG tablet        No current facility-administered medications for this visit.       Allergies   Allergen Reactions   • Adhesive Tape      Steri Strip Adhesive Allergy    • Bactrim [Sulfamethoxazole-Trimethoprim] Hives, Itching and Swelling   • Codeine Hives, Itching and Swelling   • Keflex [Cephalexin] Hives, Itching and Swelling     Social History     Social History   • Marital status: Single     Social History Main Topics   • Smoking status: Current Every Day Smoker     Packs/day: 1.00     Years: 15.00     Types: Cigarettes   • Smokeless tobacco: Never Used   • Alcohol use No   • Drug use: No   • Sexual activity: Defer     Other Topics Concern   • Not on file     History reviewed. No pertinent family history.  Past Surgical History:   Procedure Laterality Date   • CHOLECYSTECTOMY     • NERVE SURGERY Right      Review of Systems   Constitutional: Positive for activity change and fatigue.   HENT: Positive for rhinorrhea and sinus pain.    Respiratory: Positive for cough.    Endocrine: Positive for cold intolerance.   Genitourinary: Positive for menstrual problem.   Musculoskeletal: Positive for arthralgias, back pain, gait problem, joint swelling, myalgias, neck pain and neck stiffness.   Allergic/Immunologic: Positive for environmental allergies and food allergies.   Neurological: Positive for numbness and headaches.   Psychiatric/Behavioral: Positive for agitation, confusion, dysphoric mood and sleep disturbance. The patient is nervous/anxious.    All other systems reviewed and are negative.    Vitals:    03/19/18 0854   BP: 120/82   BP Location: Right arm   Patient Position: Sitting   Pulse: 86  "  Temp: 99.4 °F (37.4 °C)   TempSrc: Temporal Artery    SpO2: 96%   Weight: 90 kg (198 lb 6.4 oz)   Height: 167.6 cm (65.98\")     Physical Exam  Neurologic Exam  Physical Exam   Constitutional: The patient is oriented to person, place, and time.  The patient appears well-developed and well-nourished and in no distress.   Neat  healthy female  HENT:   Head: Normocephalic.   Right Ear: Hearing normal.   Left Ear: Hearing normal.   Mouth/Throat: Uvula is midline, oropharynx is clear and moist and mucous membranes are normal.   Eyes: Conjunctivae, EOM and lids are normal. Pupils are equal, round, and reactive to light.   Fundoscopic exam:  The right eye shows no papilledema.   The left eye shows no papilledema.   Pupils 2 mm; Fundi normal   Neck: Trachea normal and normal range of motion. No thyroid mass present.   Shoulder ROM normal  Cardiovascular: Regular rhythm   Pulses:  Carotid pulses are 2+ on the right side, and 2+ on the left side.  Radial pulses are 2+ on the right side, and 2+ on the left side.   Dorsalis pedis pulses are 2+ on the right side, and 2+ on the left side.   Pulmonary/Chest: Effort normal   Musculoskeletal:   Lumbar back: The patient exhibits pain with movement. The patient exhibits diminished range of motion, no deformity and no spasm.   Mild stiffness; SLR increased low back and  pain; Hip ROM normal        Neurologic Exam      Mental Status   Oriented to person, place, and time.   Attention: normal. Concentration: normal.   Speech: speech is normal   Level of consciousness: alert  Knowledge: good and consistent with education.   Normal comprehension.      Cranial Nerves   Cranial nerves II through XII intact.      Motor Exam   Muscle bulk: normal  Overall muscle tone: normal  No Pronator Drift    Strength   Strength 5/5 throughout.      Sensory Exam   Light touch normal except hypesthesia more L than R below knees   Proprioception normal.      Gait, Coordination, and Reflexes      Gait: " normal     Tremor   Resting tremor: absent  Intention tremor: absent  Action tremor: absent     Reflexes   Right biceps: 2+  Left biceps: 2+  Right triceps: 2+  Left triceps: 2+  Right patellar: 2+  Left patellar: 2+  Right achilles: 1+  Left achilles: 1+  No Babinski signs  Right Yang: absent  Left Yang: absent  Right ankle clonus: absent  Left ankle clonus: absent  HOOD normal; R handed      Lillian was seen today for back pain.    Diagnoses and all orders for this visit:    Chronic bilateral low back pain without sciatica    Scoliosis (and kyphoscoliosis), idiopathic  Comments:  Lumbar    Lumbosacral spondylosis without myelopathy    Other orders  -     MRI Lumbar Spine Without Contrast; Future  -     Ambulatory Referral to Physical Therapy Evaluate and treat      Plan: I recommend beginning PT;  I will order a lumbar spine MRI to better assess the anatomy;  I will see her again after therapy and the MRI.    I, Dr. Yuriy Mon, personally performed the services described in the documentation as scribed in my presence, and the documentation is both accurate and complete.    Yuriy oMn MD

## 2018-03-27 ENCOUNTER — APPOINTMENT (OUTPATIENT)
Dept: MRI IMAGING | Facility: HOSPITAL | Age: 33
End: 2018-03-27
Attending: NEUROLOGICAL SURGERY

## 2018-04-27 ENCOUNTER — APPOINTMENT (OUTPATIENT)
Dept: GENERAL RADIOLOGY | Facility: HOSPITAL | Age: 33
End: 2018-04-27

## 2018-04-27 ENCOUNTER — HOSPITAL ENCOUNTER (EMERGENCY)
Facility: HOSPITAL | Age: 33
Discharge: HOME | End: 2018-04-27
Attending: EMERGENCY MEDICINE | Admitting: EMERGENCY MEDICINE

## 2018-04-27 VITALS
TEMPERATURE: 98.2 F | DIASTOLIC BLOOD PRESSURE: 82 MMHG | HEART RATE: 72 BPM | BODY MASS INDEX: 31.39 KG/M2 | RESPIRATION RATE: 18 BRPM | WEIGHT: 200 LBS | HEIGHT: 67 IN | OXYGEN SATURATION: 98 % | SYSTOLIC BLOOD PRESSURE: 125 MMHG

## 2018-04-27 DIAGNOSIS — S93.601A SPRAIN OF RIGHT FOOT, INITIAL ENCOUNTER: Primary | ICD-10-CM

## 2018-04-27 PROCEDURE — 96372 THER/PROPH/DIAG INJ SC/IM: CPT

## 2018-04-27 PROCEDURE — 25010000002 KETOROLAC TROMETHAMINE PER 15 MG: Performed by: PHYSICIAN ASSISTANT

## 2018-04-27 PROCEDURE — 73630 X-RAY EXAM OF FOOT: CPT | Performed by: RADIOLOGY

## 2018-04-27 PROCEDURE — 73630 X-RAY EXAM OF FOOT: CPT

## 2018-04-27 PROCEDURE — 99283 EMERGENCY DEPT VISIT LOW MDM: CPT

## 2018-04-27 RX ORDER — IBUPROFEN 800 MG/1
800 TABLET ORAL EVERY 8 HOURS PRN
Qty: 30 TABLET | Refills: 0 | Status: SHIPPED | OUTPATIENT
Start: 2018-04-27 | End: 2018-07-27

## 2018-04-27 RX ORDER — CYCLOBENZAPRINE HCL 10 MG
10 TABLET ORAL 2 TIMES DAILY PRN
Qty: 20 TABLET | Refills: 0 | OUTPATIENT
Start: 2018-04-27 | End: 2020-01-11

## 2018-04-27 RX ORDER — KETOROLAC TROMETHAMINE 30 MG/ML
60 INJECTION, SOLUTION INTRAMUSCULAR; INTRAVENOUS ONCE
Status: COMPLETED | OUTPATIENT
Start: 2018-04-27 | End: 2018-04-27

## 2018-04-27 RX ADMIN — KETOROLAC TROMETHAMINE 60 MG: 30 INJECTION, SOLUTION INTRAMUSCULAR; INTRAVENOUS at 23:00

## 2018-05-02 ENCOUNTER — HOSPITAL ENCOUNTER (OUTPATIENT)
Dept: MRI IMAGING | Facility: HOSPITAL | Age: 33
Discharge: HOME | End: 2018-05-02
Attending: NEUROLOGICAL SURGERY

## 2018-06-10 ENCOUNTER — HOSPITAL ENCOUNTER (EMERGENCY)
Facility: HOSPITAL | Age: 33
Discharge: HOME | End: 2018-06-10
Attending: FAMILY MEDICINE | Admitting: FAMILY MEDICINE

## 2018-06-10 ENCOUNTER — HOSPITAL ENCOUNTER (EMERGENCY)
Facility: HOSPITAL | Age: 33
Discharge: LEFT BEFORE DISCHARGE | End: 2018-06-10

## 2018-06-10 VITALS
BODY MASS INDEX: 32.14 KG/M2 | DIASTOLIC BLOOD PRESSURE: 77 MMHG | RESPIRATION RATE: 17 BRPM | OXYGEN SATURATION: 98 % | TEMPERATURE: 98.5 F | SYSTOLIC BLOOD PRESSURE: 112 MMHG | HEART RATE: 89 BPM | WEIGHT: 200 LBS | HEIGHT: 66 IN

## 2018-06-10 DIAGNOSIS — B37.31 VAGINAL YEAST INFECTION: Primary | ICD-10-CM

## 2018-06-10 LAB
B-HCG UR QL: NEGATIVE
BACTERIA UR QL AUTO: ABNORMAL /HPF
BILIRUB UR QL STRIP: NEGATIVE
C TRACH RRNA CVX QL NAA+PROBE: NOT DETECTED
CLARITY UR: CLEAR
COLOR UR: YELLOW
GLUCOSE UR STRIP-MCNC: NEGATIVE MG/DL
HGB UR QL STRIP.AUTO: NEGATIVE
HYALINE CASTS UR QL AUTO: ABNORMAL /LPF
KETONES UR QL STRIP: NEGATIVE
LEUKOCYTE ESTERASE UR QL STRIP.AUTO: ABNORMAL
N GONORRHOEA RRNA SPEC QL NAA+PROBE: NOT DETECTED
NITRITE UR QL STRIP: NEGATIVE
PH UR STRIP.AUTO: 5.5 [PH] (ref 5–8)
PROT UR QL STRIP: NEGATIVE
RBC # UR: ABNORMAL /HPF
REF LAB TEST METHOD: ABNORMAL
SP GR UR STRIP: 1.01 (ref 1–1.03)
SQUAMOUS #/AREA URNS HPF: ABNORMAL /HPF
UROBILINOGEN UR QL STRIP: ABNORMAL
WBC UR QL AUTO: ABNORMAL /HPF

## 2018-06-10 PROCEDURE — 81001 URINALYSIS AUTO W/SCOPE: CPT | Performed by: PHYSICIAN ASSISTANT

## 2018-06-10 PROCEDURE — 87591 N.GONORRHOEAE DNA AMP PROB: CPT | Performed by: PHYSICIAN ASSISTANT

## 2018-06-10 PROCEDURE — 87491 CHLMYD TRACH DNA AMP PROBE: CPT | Performed by: PHYSICIAN ASSISTANT

## 2018-06-10 PROCEDURE — 99283 EMERGENCY DEPT VISIT LOW MDM: CPT

## 2018-06-10 PROCEDURE — 81025 URINE PREGNANCY TEST: CPT | Performed by: PHYSICIAN ASSISTANT

## 2018-06-10 RX ORDER — BUPRENORPHINE HYDROCHLORIDE AND NALOXONE HYDROCHLORIDE DIHYDRATE 8; 2 MG/1; MG/1
1 TABLET SUBLINGUAL 2 TIMES DAILY
COMMUNITY
End: 2021-05-25

## 2018-06-10 RX ORDER — FLUCONAZOLE 150 MG/1
150 TABLET ORAL ONCE
Qty: 1 TABLET | Refills: 0 | Status: SHIPPED | OUTPATIENT
Start: 2018-06-10 | End: 2018-06-10

## 2018-06-10 RX ORDER — FLUCONAZOLE 100 MG/1
200 TABLET ORAL ONCE
Status: COMPLETED | OUTPATIENT
Start: 2018-06-10 | End: 2018-06-10

## 2018-06-10 RX ADMIN — FLUCONAZOLE 200 MG: 100 TABLET ORAL at 18:26

## 2018-06-10 NOTE — ED PROVIDER NOTES
Subjective     History provided by:  Patient   used: No    Female  Problem   Primary symptoms include genital itching.  Primary symptoms include no dysuria. There has been no fever. This is a new problem. The current episode started more than 2 days ago. The problem occurs constantly. The problem has not changed since onset.The symptoms occur spontaneously. She is not pregnant. The discharge was normal. Pertinent negatives include no abdominal pain, no diarrhea, no nausea, no vomiting and no dizziness. She has tried nothing for the symptoms. Sexual activity: sexually active. There is no concern regarding sexually transmitted diseases. She uses nothing for contraception. Associated medical issues do not include STD, PID or perineal abscess.       Review of Systems   Constitutional: Negative for activity change and fever.   HENT: Negative for congestion and sore throat.    Eyes: Negative for pain.   Respiratory: Negative for cough, shortness of breath and wheezing.    Cardiovascular: Negative for chest pain.   Gastrointestinal: Negative for abdominal distention, abdominal pain, diarrhea, nausea and vomiting.   Genitourinary: Negative for difficulty urinating, dysuria, genital sores and vaginal discharge.   Musculoskeletal: Negative for arthralgias and myalgias.   Skin: Negative for rash and wound.   Neurological: Negative for dizziness and headaches.   Psychiatric/Behavioral: Negative for agitation.   All other systems reviewed and are negative.      Past Medical History:   Diagnosis Date   • Arthritis    • Bradycardia    • Fibromyalgia    • Heart disease    • Hepatitis C    • Myalgia        Allergies   Allergen Reactions   • Adhesive Tape      Steri Strip Adhesive Allergy    • Bactrim [Sulfamethoxazole-Trimethoprim] Hives, Itching and Swelling   • Codeine Hives, Itching and Swelling   • Keflex [Cephalexin] Hives, Itching and Swelling       Past Surgical History:   Procedure Laterality Date   •  CHOLECYSTECTOMY     • NERVE SURGERY Right        History reviewed. No pertinent family history.    Social History     Social History   • Marital status: Single     Social History Main Topics   • Smoking status: Current Every Day Smoker     Packs/day: 1.00     Years: 15.00     Types: Cigarettes   • Smokeless tobacco: Never Used   • Alcohol use No   • Drug use: No   • Sexual activity: Defer     Other Topics Concern   • Not on file           Objective   Physical Exam   Constitutional: She is oriented to person, place, and time. She appears well-developed and well-nourished.   HENT:   Head: Normocephalic and atraumatic.   Eyes: EOM are normal. Pupils are equal, round, and reactive to light.   Neck: Normal range of motion. Neck supple.   Cardiovascular: Normal rate, regular rhythm and normal heart sounds.    Pulmonary/Chest: Effort normal and breath sounds normal.   Abdominal: Soft. Bowel sounds are normal.   Genitourinary:   Genitourinary Comments: Pt deferred    Musculoskeletal: Normal range of motion.   Neurological: She is alert and oriented to person, place, and time.   Skin: Skin is warm and dry.   Psychiatric: She has a normal mood and affect. Her behavior is normal. Judgment and thought content normal.   Nursing note and vitals reviewed.      Procedures           ED Course  ED Course as of Sylvain 10 2021   Sun Sylvain 10, 2018   1809 Pt states she only wants treatment for a yeast infection at this time. States that she has no possibility of exposure to gonorrhea or chlamydia, but would like for it to be checked through the urine. Is adamant that she not be treated prophylactically for these, and states she will return for treatment if they are positive.She refuses pelvic exam. States will f/u with obgyn for this.    [DEV]      ED Course User Index  [DEV] EDWARD Martines                  MDM  Number of Diagnoses or Management Options     Amount and/or Complexity of Data Reviewed  Clinical lab tests: ordered and  reviewed  Tests in the radiology section of CPT®: ordered and reviewed  Tests in the medicine section of CPT®: reviewed and ordered    Patient Progress  Patient progress: stable        Final diagnoses:   Vaginal yeast infection            EDWARD Martines  06/10/18 2021

## 2018-06-23 ENCOUNTER — HOSPITAL ENCOUNTER (OUTPATIENT)
Dept: MRI IMAGING | Facility: HOSPITAL | Age: 33
Discharge: HOME | End: 2018-06-23
Attending: NEUROLOGICAL SURGERY | Admitting: NEUROLOGICAL SURGERY

## 2018-06-23 DIAGNOSIS — M41.20 SCOLIOSIS (AND KYPHOSCOLIOSIS), IDIOPATHIC: ICD-10-CM

## 2018-06-23 DIAGNOSIS — M54.50 CHRONIC BILATERAL LOW BACK PAIN WITHOUT SCIATICA: ICD-10-CM

## 2018-06-23 DIAGNOSIS — G89.29 CHRONIC BILATERAL LOW BACK PAIN WITHOUT SCIATICA: ICD-10-CM

## 2018-06-23 DIAGNOSIS — M47.817 LUMBOSACRAL SPONDYLOSIS WITHOUT MYELOPATHY: ICD-10-CM

## 2018-06-23 PROCEDURE — 72148 MRI LUMBAR SPINE W/O DYE: CPT | Performed by: RADIOLOGY

## 2018-06-23 PROCEDURE — 72148 MRI LUMBAR SPINE W/O DYE: CPT

## 2018-07-27 ENCOUNTER — OFFICE VISIT (OUTPATIENT)
Dept: NEUROSURGERY | Facility: CLINIC | Age: 33
End: 2018-07-27

## 2018-07-27 VITALS
SYSTOLIC BLOOD PRESSURE: 122 MMHG | HEIGHT: 66 IN | HEART RATE: 114 BPM | WEIGHT: 210 LBS | TEMPERATURE: 98.2 F | DIASTOLIC BLOOD PRESSURE: 86 MMHG | BODY MASS INDEX: 33.75 KG/M2 | OXYGEN SATURATION: 96 %

## 2018-07-27 DIAGNOSIS — M54.50 CHRONIC BILATERAL LOW BACK PAIN WITHOUT SCIATICA: Primary | ICD-10-CM

## 2018-07-27 DIAGNOSIS — G89.29 CHRONIC BILATERAL THORACIC BACK PAIN: ICD-10-CM

## 2018-07-27 DIAGNOSIS — M47.817 LUMBOSACRAL SPONDYLOSIS WITHOUT MYELOPATHY: ICD-10-CM

## 2018-07-27 DIAGNOSIS — M41.20 SCOLIOSIS (AND KYPHOSCOLIOSIS), IDIOPATHIC: ICD-10-CM

## 2018-07-27 DIAGNOSIS — G89.29 CHRONIC BILATERAL LOW BACK PAIN WITHOUT SCIATICA: Primary | ICD-10-CM

## 2018-07-27 DIAGNOSIS — M54.6 CHRONIC BILATERAL THORACIC BACK PAIN: ICD-10-CM

## 2018-07-27 PROCEDURE — 99213 OFFICE O/P EST LOW 20 MIN: CPT | Performed by: NEUROLOGICAL SURGERY

## 2018-07-27 NOTE — PROGRESS NOTES
Patient: Lillian Ayon  :  1985  Chart #:  [id number]    Date of Service: 18    Chief Complaint:   Chief Complaint   Patient presents with   • Back Pain       Back Pain   Chronicity: Ms. Ayon returns today for a follow up on her back pain. Episode onset: She was changing a tire recently when she threw her back out.   Although, she has had back pain for over a year now.  The problem occurs daily. The problem is unchanged. The pain is present in the lumbar spine. The pain radiates to the left knee and left thigh (Patient has pain that runs down her LLE to the knee.  She has pain in the buttocks as well.). The pain is at a severity of 4/10. The pain is mild. The pain is worse during the day. The symptoms are aggravated by position and standing. Stiffness is present in the morning. Associated symptoms include headaches and numbness. Risk factors: She takes Ibuprofen and Gabapentin for her pain.  She was doing a bit better until recently when she was working on the car. She has tried bed rest, heat and NSAIDs (She has not been able to find a pain clinic to take her because she is on suboxone.) for the symptoms. The treatment provided mild relief.       Radiographic Images:   MRI of the lumbar spine dated 18 shows she has normal sagittal alignment.  She has disc desiccation at L5-S1.  She has type II modic changes at L5-S1.  All of her other lumbar discs appear normal.  She has central disc bulging at L5-S1 and some lateral recess stenosis at L5-S1 on the left.  There is no S1 root compression.    Past Medical History:   Diagnosis Date   • Arthritis    • Bradycardia    • Fibromyalgia    • Heart disease    • Hepatitis C    • Myalgia      Current Outpatient Prescriptions   Medication Sig Dispense Refill   • ARIPiprazole (ABILIFY) 10 MG tablet      • buprenorphine-naloxone (SUBOXONE) 8-2 MG per SL tablet Place 1 tablet under the tongue 2 (Two) Times a Day.     • clonazePAM (KlonoPIN) 0.5 MG tablet     "  • cyclobenzaprine (FLEXERIL) 10 MG tablet Take 1 tablet by mouth 2 (Two) Times a Day As Needed for Muscle Spasms. 20 tablet 0   • fluconazole (DIFLUCAN) 200 MG tablet Take 1 tablet by mouth daily. 3 tablet 0   • gabapentin (NEURONTIN) 800 MG tablet      • ibuprofen (ADVIL,MOTRIN) 200 MG tablet Take 200 mg by mouth Every 6 (Six) Hours As Needed for Mild Pain .     • sertraline (ZOLOFT) 50 MG tablet        No current facility-administered medications for this visit.       Allergies   Allergen Reactions   • Adhesive Tape      Steri Strip Adhesive Allergy    • Bactrim [Sulfamethoxazole-Trimethoprim] Hives, Itching and Swelling   • Codeine Hives, Itching and Swelling   • Keflex [Cephalexin] Hives, Itching and Swelling     Social History     Social History   • Marital status: Single     Social History Main Topics   • Smoking status: Current Every Day Smoker     Packs/day: 1.00     Years: 15.00     Types: Cigarettes   • Smokeless tobacco: Never Used   • Alcohol use No   • Drug use: No   • Sexual activity: Defer     Other Topics Concern   • Not on file     History reviewed. No pertinent family history.  Past Surgical History:   Procedure Laterality Date   • CHOLECYSTECTOMY     • NERVE SURGERY Right      Review of Systems   Constitutional: Positive for fatigue.   HENT: Positive for sinus pressure.    Respiratory: Positive for cough.    Musculoskeletal: Positive for arthralgias, back pain, gait problem, myalgias, neck pain and neck stiffness.   Allergic/Immunologic: Positive for environmental allergies and food allergies.   Neurological: Positive for tremors, numbness and headaches.   Psychiatric/Behavioral: Positive for dysphoric mood.   All other systems reviewed and are negative.    Vitals:    07/27/18 0852   BP: 122/86   BP Location: Right arm   Patient Position: Sitting   Pulse: 114   Temp: 98.2 °F (36.8 °C)   TempSrc: Temporal Artery    SpO2: 96%   Weight: 95.3 kg (210 lb)   Height: 167.6 cm (65.98\")     Physical " Exam  Neurologic Exam  Constitutional: The patient is oriented to person, place, and time.  The patient appears well-developed and well-nourished and in no distress.   Neat  healthy female  Neck: Trachea normal and normal range of motion. No thyroid mass present.   Shoulder ROM normal  Cardiovascular: Regular rhythm   Pulses:  Carotid pulses are 2+ on the right side, and 2+ on the left side.  Radial pulses are 2+ on the right side, and 2+ on the left side.   Dorsalis pedis pulses are 2+ on the right side, and 2+ on the left side.   Pulmonary/Chest: Effort normal   Musculoskeletal:   Thoracic back shows mild kyphosis and paraspinal muscles are tender in mid thoracic region.    Lumbar back: The patient exhibits pain with movement. The patient exhibits diminished range of motion, no deformity and no spasm.   Mild stiffness; SLR increased low back pain; Hip ROM normal        Neurologic Exam      Mental Status   Oriented to person, place, and time.   Attention: normal. Concentration: normal.   Speech: speech is normal   Level of consciousness: alert  Knowledge: good and consistent with education.   Normal comprehension.      Cranial Nerves   Cranial nerves II through XII intact.      Motor Exam   Muscle bulk: normal  Overall muscle tone: normal  No Pronator Drift     Strength   Strength 5/5 throughout.      Sensory Exam   Light touch normal except hypesthesia more L than R below knees   Proprioception normal.      Gait, Coordination, and Reflexes      Gait: normal     Tremor   Resting tremor: absent  Intention tremor: absent  Action tremor: absent     Reflexes   Right biceps: 2+  Left biceps: 2+  Right triceps: 2+  Left triceps: 2+  Right patellar: 2+  Left patellar: 2+  Right achilles: 1+  Left achilles: 1+  No Babinski signs  Right Yang: absent  Left Yang: absent  Right ankle clonus: absent  Left ankle clonus: absent  HOOD normal; R handed      Lillian was seen today for back pain.    Diagnoses and all orders for  this visit:    Chronic bilateral low back pain without sciatica    Scoliosis (and kyphoscoliosis), idiopathic    Lumbosacral spondylosis without myelopathy    Chronic bilateral thoracic back pain      Plan:  Order thoracic spine MRI for thoracic back pain;  She needs to see her PCP for referral to pain management.  Return for re-evaluation after MRI.    I, Dr. Yuriy Mon, personally performed the services described in the documentation as scribed in my presence, and the documentation is both accurate and complete.    Yuriy Mon MD

## 2018-08-08 DIAGNOSIS — G89.29 CHRONIC BILATERAL LOW BACK PAIN WITHOUT SCIATICA: Primary | ICD-10-CM

## 2018-08-08 DIAGNOSIS — M54.50 CHRONIC BILATERAL LOW BACK PAIN WITHOUT SCIATICA: Primary | ICD-10-CM

## 2018-08-08 DIAGNOSIS — M54.6 CHRONIC BILATERAL THORACIC BACK PAIN: ICD-10-CM

## 2018-08-08 DIAGNOSIS — G89.29 CHRONIC BILATERAL THORACIC BACK PAIN: ICD-10-CM

## 2018-08-08 DIAGNOSIS — M41.20 SCOLIOSIS (AND KYPHOSCOLIOSIS), IDIOPATHIC: ICD-10-CM

## 2018-08-08 DIAGNOSIS — M47.817 LUMBOSACRAL SPONDYLOSIS WITHOUT MYELOPATHY: ICD-10-CM

## 2018-08-15 ENCOUNTER — DOCUMENTATION (OUTPATIENT)
Dept: NEUROSURGERY | Facility: CLINIC | Age: 33
End: 2018-08-15

## 2018-08-15 NOTE — PROGRESS NOTES
It appears MRI of the thoracic spine was a no show.  She appears to have an apt with Dr Lomeli on the 17th.

## 2018-08-23 ENCOUNTER — HOSPITAL ENCOUNTER (EMERGENCY)
Facility: HOSPITAL | Age: 33
Discharge: HOME | End: 2018-08-24
Attending: FAMILY MEDICINE | Admitting: FAMILY MEDICINE

## 2018-08-23 ENCOUNTER — APPOINTMENT (OUTPATIENT)
Dept: CT IMAGING | Facility: HOSPITAL | Age: 33
End: 2018-08-23

## 2018-08-23 DIAGNOSIS — T74.91XA DOMESTIC VIOLENCE OF ADULT, INITIAL ENCOUNTER: ICD-10-CM

## 2018-08-23 DIAGNOSIS — S06.0X0A CONCUSSION WITHOUT LOSS OF CONSCIOUSNESS, INITIAL ENCOUNTER: Primary | ICD-10-CM

## 2018-08-23 DIAGNOSIS — S16.1XXA STRAIN OF NECK MUSCLE, INITIAL ENCOUNTER: ICD-10-CM

## 2018-08-23 DIAGNOSIS — F19.10 POLYSUBSTANCE ABUSE (HCC): ICD-10-CM

## 2018-08-23 LAB
6-ACETYL MORPHINE: NEGATIVE
AMPHET+METHAMPHET UR QL: POSITIVE
B-HCG UR QL: NEGATIVE
BARBITURATES UR QL SCN: NEGATIVE
BENZODIAZ UR QL SCN: POSITIVE
BUPRENORPHINE SERPL-MCNC: POSITIVE NG/ML
CANNABINOIDS SERPL QL: POSITIVE
COCAINE UR QL: NEGATIVE
ETHANOL BLD-MCNC: <10 MG/DL
ETHANOL UR QL: <0.01 %
METHADONE UR QL SCN: NEGATIVE
OPIATES UR QL: NEGATIVE
OXYCODONE UR QL SCN: NEGATIVE
PCP UR QL SCN: NEGATIVE

## 2018-08-23 PROCEDURE — 80307 DRUG TEST PRSMV CHEM ANLYZR: CPT | Performed by: PHYSICIAN ASSISTANT

## 2018-08-23 PROCEDURE — 72125 CT NECK SPINE W/O DYE: CPT | Performed by: RADIOLOGY

## 2018-08-23 PROCEDURE — 96374 THER/PROPH/DIAG INJ IV PUSH: CPT

## 2018-08-23 PROCEDURE — 25010000002 KETOROLAC TROMETHAMINE PER 15 MG: Performed by: PHYSICIAN ASSISTANT

## 2018-08-23 PROCEDURE — 70450 CT HEAD/BRAIN W/O DYE: CPT

## 2018-08-23 PROCEDURE — 81025 URINE PREGNANCY TEST: CPT | Performed by: PHYSICIAN ASSISTANT

## 2018-08-23 PROCEDURE — 99284 EMERGENCY DEPT VISIT MOD MDM: CPT

## 2018-08-23 PROCEDURE — 96375 TX/PRO/DX INJ NEW DRUG ADDON: CPT

## 2018-08-23 PROCEDURE — 96361 HYDRATE IV INFUSION ADD-ON: CPT

## 2018-08-23 PROCEDURE — 25010000002 PROCHLORPERAZINE EDISYLATE PER 10 MG: Performed by: PHYSICIAN ASSISTANT

## 2018-08-23 PROCEDURE — 70450 CT HEAD/BRAIN W/O DYE: CPT | Performed by: RADIOLOGY

## 2018-08-23 PROCEDURE — 72125 CT NECK SPINE W/O DYE: CPT

## 2018-08-23 RX ORDER — KETOROLAC TROMETHAMINE 30 MG/ML
30 INJECTION, SOLUTION INTRAMUSCULAR; INTRAVENOUS ONCE
Status: COMPLETED | OUTPATIENT
Start: 2018-08-23 | End: 2018-08-23

## 2018-08-23 RX ORDER — SODIUM CHLORIDE 0.9 % (FLUSH) 0.9 %
10 SYRINGE (ML) INJECTION AS NEEDED
Status: DISCONTINUED | OUTPATIENT
Start: 2018-08-23 | End: 2018-08-24 | Stop reason: HOSPADM

## 2018-08-23 RX ADMIN — KETOROLAC TROMETHAMINE 30 MG: 30 INJECTION, SOLUTION INTRAMUSCULAR; INTRAVENOUS at 21:21

## 2018-08-23 RX ADMIN — PROCHLORPERAZINE EDISYLATE 10 MG: 5 INJECTION INTRAMUSCULAR; INTRAVENOUS at 21:21

## 2018-08-23 RX ADMIN — SODIUM CHLORIDE 1000 ML: 9 INJECTION, SOLUTION INTRAVENOUS at 21:22

## 2018-08-24 VITALS
DIASTOLIC BLOOD PRESSURE: 78 MMHG | BODY MASS INDEX: 31.34 KG/M2 | OXYGEN SATURATION: 98 % | TEMPERATURE: 98.1 F | WEIGHT: 195 LBS | SYSTOLIC BLOOD PRESSURE: 122 MMHG | HEART RATE: 82 BPM | RESPIRATION RATE: 16 BRPM | HEIGHT: 66 IN

## 2018-10-30 ENCOUNTER — LAB (OUTPATIENT)
Dept: LAB | Facility: HOSPITAL | Age: 33
End: 2018-10-30

## 2018-10-30 ENCOUNTER — TRANSCRIBE ORDERS (OUTPATIENT)
Dept: ADMINISTRATIVE | Facility: HOSPITAL | Age: 33
End: 2018-10-30

## 2018-10-30 DIAGNOSIS — R17 JAUNDICE: ICD-10-CM

## 2018-10-30 DIAGNOSIS — R17 JAUNDICE: Primary | ICD-10-CM

## 2018-10-30 PROCEDURE — 36415 COLL VENOUS BLD VENIPUNCTURE: CPT

## 2018-10-30 PROCEDURE — 80074 ACUTE HEPATITIS PANEL: CPT

## 2018-10-31 LAB
HAV IGM SERPL QL IA: REACTIVE
HBV CORE IGM SERPL QL IA: ABNORMAL
HBV SURFACE AG SERPL QL IA: ABNORMAL
HCV AB SER DONR QL: REACTIVE

## 2019-01-13 ENCOUNTER — APPOINTMENT (OUTPATIENT)
Dept: GENERAL RADIOLOGY | Facility: HOSPITAL | Age: 34
End: 2019-01-13

## 2019-01-13 ENCOUNTER — HOSPITAL ENCOUNTER (EMERGENCY)
Facility: HOSPITAL | Age: 34
Discharge: HOME | End: 2019-01-13
Attending: EMERGENCY MEDICINE | Admitting: EMERGENCY MEDICINE

## 2019-01-13 VITALS
HEART RATE: 75 BPM | BODY MASS INDEX: 32.14 KG/M2 | WEIGHT: 200 LBS | TEMPERATURE: 97.8 F | RESPIRATION RATE: 20 BRPM | HEIGHT: 66 IN | OXYGEN SATURATION: 100 % | DIASTOLIC BLOOD PRESSURE: 76 MMHG | SYSTOLIC BLOOD PRESSURE: 126 MMHG

## 2019-01-13 DIAGNOSIS — J45.20 MILD INTERMITTENT ASTHMATIC BRONCHITIS WITHOUT COMPLICATION: Primary | ICD-10-CM

## 2019-01-13 LAB
ALBUMIN SERPL-MCNC: 3.8 G/DL (ref 3.5–5)
ALBUMIN/GLOB SERPL: 0.9 G/DL (ref 1.5–2.5)
ALP SERPL-CCNC: 77 U/L (ref 35–104)
ALT SERPL W P-5'-P-CCNC: 172 U/L (ref 10–36)
ANION GAP SERPL CALCULATED.3IONS-SCNC: 6.7 MMOL/L (ref 3.6–11.2)
AST SERPL-CCNC: 96 U/L (ref 10–30)
BASOPHILS # BLD AUTO: 0.04 10*3/MM3 (ref 0–0.3)
BASOPHILS NFR BLD AUTO: 0.6 % (ref 0–2)
BILIRUB SERPL-MCNC: 0.3 MG/DL (ref 0.2–1.8)
BUN BLD-MCNC: 6 MG/DL (ref 7–21)
BUN/CREAT SERPL: 10 (ref 7–25)
CALCIUM SPEC-SCNC: 8.9 MG/DL (ref 7.7–10)
CHLORIDE SERPL-SCNC: 106 MMOL/L (ref 99–112)
CO2 SERPL-SCNC: 23.3 MMOL/L (ref 24.3–31.9)
CREAT BLD-MCNC: 0.6 MG/DL (ref 0.43–1.29)
D-LACTATE SERPL-SCNC: 1.8 MMOL/L (ref 0.5–2)
DEPRECATED RDW RBC AUTO: 43 FL (ref 37–54)
EOSINOPHIL # BLD AUTO: 0.55 10*3/MM3 (ref 0–0.7)
EOSINOPHIL NFR BLD AUTO: 7.7 % (ref 0–5)
ERYTHROCYTE [DISTWIDTH] IN BLOOD BY AUTOMATED COUNT: 13.2 % (ref 11.5–14.5)
GFR SERPL CREATININE-BSD FRML MDRD: 117 ML/MIN/1.73
GLOBULIN UR ELPH-MCNC: 4.4 GM/DL
GLUCOSE BLD-MCNC: 107 MG/DL (ref 70–110)
HCT VFR BLD AUTO: 42.9 % (ref 37–47)
HGB BLD-MCNC: 14.4 G/DL (ref 12–16)
IMM GRANULOCYTES # BLD AUTO: 0.04 10*3/MM3 (ref 0–0.03)
IMM GRANULOCYTES NFR BLD AUTO: 0.6 % (ref 0–0.5)
LYMPHOCYTES # BLD AUTO: 3.22 10*3/MM3 (ref 1–3)
LYMPHOCYTES NFR BLD AUTO: 44.9 % (ref 21–51)
MCH RBC QN AUTO: 30.3 PG (ref 27–33)
MCHC RBC AUTO-ENTMCNC: 33.6 G/DL (ref 33–37)
MCV RBC AUTO: 90.3 FL (ref 80–94)
MONOCYTES # BLD AUTO: 0.53 10*3/MM3 (ref 0.1–0.9)
MONOCYTES NFR BLD AUTO: 7.4 % (ref 0–10)
NEUTROPHILS # BLD AUTO: 2.79 10*3/MM3 (ref 1.4–6.5)
NEUTROPHILS NFR BLD AUTO: 38.8 % (ref 30–70)
OSMOLALITY SERPL CALC.SUM OF ELEC: 270 MOSM/KG (ref 273–305)
PLATELET # BLD AUTO: 200 10*3/MM3 (ref 130–400)
PMV BLD AUTO: 8.8 FL (ref 6–10)
POTASSIUM BLD-SCNC: 3.8 MMOL/L (ref 3.5–5.3)
PROT SERPL-MCNC: 8.2 G/DL (ref 6–8)
RBC # BLD AUTO: 4.75 10*6/MM3 (ref 4.2–5.4)
SODIUM BLD-SCNC: 136 MMOL/L (ref 135–153)
WBC NRBC COR # BLD: 7.17 10*3/MM3 (ref 4.5–12.5)

## 2019-01-13 PROCEDURE — 93005 ELECTROCARDIOGRAM TRACING: CPT | Performed by: EMERGENCY MEDICINE

## 2019-01-13 PROCEDURE — 94640 AIRWAY INHALATION TREATMENT: CPT

## 2019-01-13 PROCEDURE — 80053 COMPREHEN METABOLIC PANEL: CPT | Performed by: PHYSICIAN ASSISTANT

## 2019-01-13 PROCEDURE — 94799 UNLISTED PULMONARY SVC/PX: CPT

## 2019-01-13 PROCEDURE — 71046 X-RAY EXAM CHEST 2 VIEWS: CPT

## 2019-01-13 PROCEDURE — 85025 COMPLETE CBC W/AUTO DIFF WBC: CPT | Performed by: PHYSICIAN ASSISTANT

## 2019-01-13 PROCEDURE — 99284 EMERGENCY DEPT VISIT MOD MDM: CPT

## 2019-01-13 PROCEDURE — 71046 X-RAY EXAM CHEST 2 VIEWS: CPT | Performed by: RADIOLOGY

## 2019-01-13 PROCEDURE — 93010 ELECTROCARDIOGRAM REPORT: CPT | Performed by: INTERNAL MEDICINE

## 2019-01-13 PROCEDURE — 96372 THER/PROPH/DIAG INJ SC/IM: CPT

## 2019-01-13 PROCEDURE — 36415 COLL VENOUS BLD VENIPUNCTURE: CPT | Performed by: PHYSICIAN ASSISTANT

## 2019-01-13 PROCEDURE — 83605 ASSAY OF LACTIC ACID: CPT | Performed by: PHYSICIAN ASSISTANT

## 2019-01-13 PROCEDURE — 25010000002 DEXAMETHASONE PER 1 MG: Performed by: PHYSICIAN ASSISTANT

## 2019-01-13 RX ORDER — ALBUTEROL SULFATE 90 UG/1
2 AEROSOL, METERED RESPIRATORY (INHALATION)
Qty: 6.7 G | Refills: 0 | Status: ON HOLD | OUTPATIENT
Start: 2019-01-13 | End: 2022-09-09

## 2019-01-13 RX ORDER — BENZONATATE 100 MG/1
100 CAPSULE ORAL 3 TIMES DAILY PRN
Qty: 15 CAPSULE | Refills: 0 | OUTPATIENT
Start: 2019-01-13 | End: 2020-01-11

## 2019-01-13 RX ORDER — AZITHROMYCIN 250 MG/1
TABLET, FILM COATED ORAL
Qty: 6 TABLET | Refills: 0 | OUTPATIENT
Start: 2019-01-13 | End: 2020-01-11

## 2019-01-13 RX ORDER — IPRATROPIUM BROMIDE AND ALBUTEROL SULFATE 2.5; .5 MG/3ML; MG/3ML
3 SOLUTION RESPIRATORY (INHALATION) ONCE
Status: COMPLETED | OUTPATIENT
Start: 2019-01-13 | End: 2019-01-13

## 2019-01-13 RX ORDER — DEXAMETHASONE SODIUM PHOSPHATE 4 MG/ML
8 INJECTION, SOLUTION INTRA-ARTICULAR; INTRALESIONAL; INTRAMUSCULAR; INTRAVENOUS; SOFT TISSUE ONCE
Status: COMPLETED | OUTPATIENT
Start: 2019-01-13 | End: 2019-01-13

## 2019-01-13 RX ADMIN — DEXAMETHASONE SODIUM PHOSPHATE 8 MG: 4 INJECTION, SOLUTION INTRAMUSCULAR; INTRAVENOUS at 17:12

## 2019-01-13 RX ADMIN — IPRATROPIUM BROMIDE AND ALBUTEROL SULFATE 3 ML: .5; 3 SOLUTION RESPIRATORY (INHALATION) at 14:23

## 2019-01-13 RX ADMIN — IPRATROPIUM BROMIDE AND ALBUTEROL SULFATE 3 ML: .5; 3 SOLUTION RESPIRATORY (INHALATION) at 15:51

## 2019-01-13 NOTE — ED NOTES
Attempted blood drawl x4 with no success, lab was called for assistance.      Felecia Torres  01/13/19 [id number]

## 2019-01-13 NOTE — ED NOTES
Discharge instructions reviewed with patient, patient instructed to return to ED if symptoms worsen or if any new problems arise. Patient verbalizes understanding of discharge instructions, patient ambulatory out of ED. No acute distress noted.       Chetna Love RN  01/13/19 [id number]

## 2019-01-16 ENCOUNTER — APPOINTMENT (OUTPATIENT)
Dept: GENERAL RADIOLOGY | Facility: HOSPITAL | Age: 34
End: 2019-01-16

## 2019-01-16 ENCOUNTER — HOSPITAL ENCOUNTER (EMERGENCY)
Facility: HOSPITAL | Age: 34
Discharge: HOME | End: 2019-01-16
Admitting: EMERGENCY MEDICINE

## 2019-01-16 VITALS
SYSTOLIC BLOOD PRESSURE: 120 MMHG | HEIGHT: 66 IN | OXYGEN SATURATION: 98 % | RESPIRATION RATE: 18 BRPM | HEART RATE: 112 BPM | TEMPERATURE: 98.2 F | DIASTOLIC BLOOD PRESSURE: 60 MMHG | WEIGHT: 215 LBS | BODY MASS INDEX: 34.55 KG/M2

## 2019-01-16 DIAGNOSIS — L03.119 CELLULITIS OF ANKLE: Primary | ICD-10-CM

## 2019-01-16 LAB
ALBUMIN SERPL-MCNC: 3.8 G/DL (ref 3.5–5)
ALBUMIN/GLOB SERPL: 0.8 G/DL (ref 1.5–2.5)
ALP SERPL-CCNC: 75 U/L (ref 35–104)
ALT SERPL W P-5'-P-CCNC: 121 U/L (ref 10–36)
ANION GAP SERPL CALCULATED.3IONS-SCNC: 8.7 MMOL/L (ref 3.6–11.2)
AST SERPL-CCNC: 74 U/L (ref 10–30)
BASOPHILS # BLD AUTO: 0.05 10*3/MM3 (ref 0–0.3)
BASOPHILS NFR BLD AUTO: 0.5 % (ref 0–2)
BILIRUB SERPL-MCNC: 0.3 MG/DL (ref 0.2–1.8)
BUN BLD-MCNC: <5 MG/DL (ref 7–21)
BUN/CREAT SERPL: ABNORMAL (ref 7–25)
CALCIUM SPEC-SCNC: 9.3 MG/DL (ref 7.7–10)
CHLORIDE SERPL-SCNC: 102 MMOL/L (ref 99–112)
CO2 SERPL-SCNC: 21.3 MMOL/L (ref 24.3–31.9)
CREAT BLD-MCNC: 0.57 MG/DL (ref 0.43–1.29)
DEPRECATED RDW RBC AUTO: 44.8 FL (ref 37–54)
EOSINOPHIL # BLD AUTO: 0.23 10*3/MM3 (ref 0–0.7)
EOSINOPHIL NFR BLD AUTO: 2.1 % (ref 0–5)
ERYTHROCYTE [DISTWIDTH] IN BLOOD BY AUTOMATED COUNT: 13.1 % (ref 11.5–14.5)
ERYTHROCYTE [SEDIMENTATION RATE] IN BLOOD: 17 MM/HR (ref 0–20)
GFR SERPL CREATININE-BSD FRML MDRD: 124 ML/MIN/1.73
GLOBULIN UR ELPH-MCNC: 4.6 GM/DL
GLUCOSE BLD-MCNC: 68 MG/DL (ref 70–110)
HCT VFR BLD AUTO: 40.4 % (ref 37–47)
HGB BLD-MCNC: 13.4 G/DL (ref 12–16)
IMM GRANULOCYTES # BLD AUTO: 0.03 10*3/MM3 (ref 0–0.03)
IMM GRANULOCYTES NFR BLD AUTO: 0.3 % (ref 0–0.5)
LYMPHOCYTES # BLD AUTO: 3.76 10*3/MM3 (ref 1–3)
LYMPHOCYTES NFR BLD AUTO: 34.3 % (ref 21–51)
MCH RBC QN AUTO: 30.9 PG (ref 27–33)
MCHC RBC AUTO-ENTMCNC: 33.2 G/DL (ref 33–37)
MCV RBC AUTO: 93.3 FL (ref 80–94)
MONOCYTES # BLD AUTO: 0.79 10*3/MM3 (ref 0.1–0.9)
MONOCYTES NFR BLD AUTO: 7.2 % (ref 0–10)
NEUTROPHILS # BLD AUTO: 6.11 10*3/MM3 (ref 1.4–6.5)
NEUTROPHILS NFR BLD AUTO: 55.6 % (ref 30–70)
OSMOLALITY SERPL CALC.SUM OF ELEC: NORMAL MOSM/KG (ref 273–305)
PLATELET # BLD AUTO: 273 10*3/MM3 (ref 130–400)
PMV BLD AUTO: 8.8 FL (ref 6–10)
POTASSIUM BLD-SCNC: 4.8 MMOL/L (ref 3.5–5.3)
PROT SERPL-MCNC: 8.4 G/DL (ref 6–8)
RBC # BLD AUTO: 4.33 10*6/MM3 (ref 4.2–5.4)
SODIUM BLD-SCNC: 132 MMOL/L (ref 135–153)
WBC NRBC COR # BLD: 10.97 10*3/MM3 (ref 4.5–12.5)

## 2019-01-16 PROCEDURE — 96372 THER/PROPH/DIAG INJ SC/IM: CPT

## 2019-01-16 PROCEDURE — 73610 X-RAY EXAM OF ANKLE: CPT

## 2019-01-16 PROCEDURE — 80053 COMPREHEN METABOLIC PANEL: CPT | Performed by: NURSE PRACTITIONER

## 2019-01-16 PROCEDURE — 99284 EMERGENCY DEPT VISIT MOD MDM: CPT

## 2019-01-16 PROCEDURE — 87040 BLOOD CULTURE FOR BACTERIA: CPT | Performed by: NURSE PRACTITIONER

## 2019-01-16 PROCEDURE — 85025 COMPLETE CBC W/AUTO DIFF WBC: CPT | Performed by: NURSE PRACTITIONER

## 2019-01-16 PROCEDURE — 73610 X-RAY EXAM OF ANKLE: CPT | Performed by: RADIOLOGY

## 2019-01-16 PROCEDURE — 85652 RBC SED RATE AUTOMATED: CPT | Performed by: NURSE PRACTITIONER

## 2019-01-16 PROCEDURE — 36415 COLL VENOUS BLD VENIPUNCTURE: CPT

## 2019-01-16 RX ORDER — CLINDAMYCIN PHOSPHATE 150 MG/ML
600 INJECTION, SOLUTION INTRAVENOUS ONCE
Status: COMPLETED | OUTPATIENT
Start: 2019-01-16 | End: 2019-01-16

## 2019-01-16 RX ORDER — CLINDAMYCIN PHOSPHATE 600 MG/50ML
600 INJECTION INTRAVENOUS ONCE
Status: DISCONTINUED | OUTPATIENT
Start: 2019-01-16 | End: 2019-01-16

## 2019-01-16 RX ORDER — TRAMADOL HYDROCHLORIDE 50 MG/1
50 TABLET ORAL ONCE
Status: COMPLETED | OUTPATIENT
Start: 2019-01-16 | End: 2019-01-16

## 2019-01-16 RX ORDER — SODIUM CHLORIDE 0.9 % (FLUSH) 0.9 %
10 SYRINGE (ML) INJECTION AS NEEDED
Status: DISCONTINUED | OUTPATIENT
Start: 2019-01-16 | End: 2019-01-17 | Stop reason: HOSPADM

## 2019-01-16 RX ORDER — CLINDAMYCIN HYDROCHLORIDE 300 MG/1
300 CAPSULE ORAL 4 TIMES DAILY
Qty: 40 CAPSULE | Refills: 0 | Status: SHIPPED | OUTPATIENT
Start: 2019-01-16 | End: 2019-01-26

## 2019-01-16 RX ADMIN — CLINDAMYCIN PHOSPHATE 600 MG: 150 INJECTION, SOLUTION INTRAMUSCULAR; INTRAVENOUS at 21:53

## 2019-01-16 RX ADMIN — TRAMADOL HYDROCHLORIDE 50 MG: 50 TABLET, COATED ORAL at 22:30

## 2019-01-17 NOTE — DISCHARGE INSTRUCTIONS
Follow up with your primary care provider in 2-3 days.    Return to the emergency room for worsening symptoms.

## 2019-01-17 NOTE — ED NOTES
Report received from MIKAELA Ingram RN at this time     Keesha Beauchamp, JAGRUTI  01/16/19 [id number]

## 2019-01-17 NOTE — ED PROVIDER NOTES
Subjective     History provided by:  Patient   used: No    Lower Extremity Issue   Location:  Ankle  Time since incident:  2 days  Injury: no    Ankle location:  R ankle  Pain details:     Quality:  Pressure    Radiates to:  R leg    Severity:  Moderate    Onset quality:  Sudden    Duration:  2 days    Timing:  Constant    Progression:  Worsening  Chronicity:  New  Dislocation: no    Foreign body present:  No foreign bodies  Tetanus status:  Up to date  Prior injury to area:  No  Relieved by:  Nothing  Worsened by:  Nothing  Ineffective treatments:  None tried  Associated symptoms: swelling    Associated symptoms: no back pain, no decreased ROM, no fever, no itching, no muscle weakness, no neck pain, no numbness and no stiffness    Risk factors: no concern for non-accidental trauma and no frequent fractures    Risk factors comment:  Patient reports history of MRSA infections.      Review of Systems   Constitutional: Negative.  Negative for fever.   HENT: Negative.    Eyes: Negative.    Respiratory: Negative.    Cardiovascular: Negative.    Gastrointestinal: Negative.    Endocrine: Negative.    Genitourinary: Negative.    Musculoskeletal: Negative.  Negative for back pain, neck pain and stiffness.   Skin: Negative.  Negative for itching.   Allergic/Immunologic: Negative.    Neurological: Negative.    Hematological: Negative.    Psychiatric/Behavioral: Negative.        Past Medical History:   Diagnosis Date   • Anxiety    • Arthritis    • Bradycardia    • Fibromyalgia    • Heart disease    • Hepatitis C    • Myalgia        Allergies   Allergen Reactions   • Adhesive Tape      Steri Strip Adhesive Allergy    • Bactrim [Sulfamethoxazole-Trimethoprim] Hives, Itching and Swelling   • Codeine Hives, Itching and Swelling   • Keflex [Cephalexin] Hives, Itching and Swelling       Past Surgical History:   Procedure Laterality Date   • CHOLECYSTECTOMY     • NERVE SURGERY Right        No family history on  file.    Social History     Socioeconomic History   • Marital status: Single     Spouse name: Not on file   • Number of children: Not on file   • Years of education: Not on file   • Highest education level: Not on file   Tobacco Use   • Smoking status: Current Every Day Smoker     Packs/day: 1.00     Years: 15.00     Pack years: 15.00     Types: Cigarettes   • Smokeless tobacco: Never Used   Substance and Sexual Activity   • Alcohol use: No   • Drug use: No   • Sexual activity: Defer           Objective   Physical Exam   Constitutional: She is oriented to person, place, and time. She appears well-developed and well-nourished.   HENT:   Head: Normocephalic.   Right Ear: External ear normal.   Left Ear: External ear normal.   Mouth/Throat: Oropharynx is clear and moist.   Eyes: EOM are normal. Pupils are equal, round, and reactive to light.   Neck: Normal range of motion. Neck supple.   Cardiovascular: Regular rhythm.   Pulmonary/Chest: Effort normal and breath sounds normal.   Abdominal: Soft. Bowel sounds are normal.   Neurological: She is alert and oriented to person, place, and time.   Skin: Skin is warm and dry. Capillary refill takes less than 2 seconds. There is erythema (Moderate sized area of erythema right medial ankle; tender; warm to touch; no fluctuant areas.  Consistent with cellulitis).   Psychiatric: She has a normal mood and affect. Her behavior is normal.   Nursing note and vitals reviewed.      Procedures           ED Course                  MDM      Final diagnoses:   Cellulitis of ankle            Enoch Rosenthal, APRN  01/16/19 [id number]

## 2019-01-21 LAB
BACTERIA SPEC AEROBE CULT: NORMAL
BACTERIA SPEC AEROBE CULT: NORMAL

## 2019-01-23 NOTE — ED PROVIDER NOTES
Subjective   Per triage         History provided by:  Patient   used: No    Shortness of Breath   Severity:  Mild  Onset quality:  Sudden  Duration:  2 days  Timing:  Constant  Progression:  Worsening  Chronicity:  New  Context: activity and known allergens    Relieved by:  Nothing  Worsened by:  Deep breathing  Ineffective treatments:  None tried  Associated symptoms: chest pain, cough and vomiting    Associated symptoms: no ear pain, no fever, no headaches, no rash, no sore throat and no wheezing        Review of Systems   Constitutional: Negative for chills and fever.   HENT: Negative for congestion, ear pain and sore throat.    Respiratory: Positive for cough and shortness of breath. Negative for wheezing.    Cardiovascular: Positive for chest pain.   Gastrointestinal: Positive for nausea and vomiting. Negative for diarrhea.   Genitourinary: Negative for dysuria and flank pain.   Skin: Negative for rash.   Neurological: Negative for headaches.   Psychiatric/Behavioral: The patient is not nervous/anxious.    All other systems reviewed and are negative.      Past Medical History:   Diagnosis Date   • Anxiety    • Arthritis    • Bradycardia    • Fibromyalgia    • Heart disease    • Hepatitis C    • Myalgia        Allergies   Allergen Reactions   • Adhesive Tape      Steri Strip Adhesive Allergy    • Bactrim [Sulfamethoxazole-Trimethoprim] Hives, Itching and Swelling   • Codeine Hives, Itching and Swelling   • Keflex [Cephalexin] Hives, Itching and Swelling       Past Surgical History:   Procedure Laterality Date   • CHOLECYSTECTOMY     • NERVE SURGERY Right        No family history on file.    Social History     Socioeconomic History   • Marital status: Single     Spouse name: Not on file   • Number of children: Not on file   • Years of education: Not on file   • Highest education level: Not on file   Tobacco Use   • Smoking status: Current Every Day Smoker     Packs/day: 1.00     Years: 15.00      Pack years: 15.00     Types: Cigarettes   • Smokeless tobacco: Never Used   Substance and Sexual Activity   • Alcohol use: No   • Drug use: No   • Sexual activity: Defer           Objective   Physical Exam   Constitutional: She is oriented to person, place, and time. She appears well-developed and well-nourished.   HENT:   Head: Normocephalic.   Mouth/Throat: Oropharynx is clear and moist.   Neck: Neck supple.   Cardiovascular: Normal rate and regular rhythm.   Pulmonary/Chest: Effort normal and breath sounds normal.   Abdominal: Soft. Bowel sounds are normal. There is no tenderness.   Musculoskeletal: Normal range of motion.   Neurological: She is alert and oriented to person, place, and time.   Skin: Skin is warm and dry.   Psychiatric: She has a normal mood and affect. Her behavior is normal. Judgment and thought content normal.   Nursing note and vitals reviewed.      Procedures           ED Course                  MDM      Final diagnoses:   Mild intermittent asthmatic bronchitis without complication            Lola De La Paz PA  01/28/19 5911       Lola De La Paz PA  01/28/19 1778

## 2019-07-26 ENCOUNTER — HOSPITAL ENCOUNTER (EMERGENCY)
Facility: HOSPITAL | Age: 34
Discharge: HOME | End: 2019-07-26
Attending: EMERGENCY MEDICINE | Admitting: EMERGENCY MEDICINE

## 2019-07-26 VITALS
DIASTOLIC BLOOD PRESSURE: 83 MMHG | SYSTOLIC BLOOD PRESSURE: 119 MMHG | OXYGEN SATURATION: 100 % | BODY MASS INDEX: 28.25 KG/M2 | HEIGHT: 67 IN | HEART RATE: 101 BPM | WEIGHT: 180 LBS | TEMPERATURE: 98.2 F | RESPIRATION RATE: 20 BRPM

## 2019-07-26 DIAGNOSIS — S61.211A LACERATION OF LEFT INDEX FINGER WITHOUT DAMAGE TO NAIL, FOREIGN BODY PRESENCE UNSPECIFIED, INITIAL ENCOUNTER: Primary | ICD-10-CM

## 2019-07-26 PROCEDURE — 90471 IMMUNIZATION ADMIN: CPT | Performed by: NURSE PRACTITIONER

## 2019-07-26 PROCEDURE — 99283 EMERGENCY DEPT VISIT LOW MDM: CPT

## 2019-07-26 PROCEDURE — 90715 TDAP VACCINE 7 YRS/> IM: CPT | Performed by: NURSE PRACTITIONER

## 2019-07-26 PROCEDURE — 25010000002 TDAP 5-2.5-18.5 LF-MCG/0.5 SUSPENSION: Performed by: NURSE PRACTITIONER

## 2019-07-26 RX ORDER — LIDOCAINE HYDROCHLORIDE 10 MG/ML
20 INJECTION, SOLUTION EPIDURAL; INFILTRATION; INTRACAUDAL; PERINEURAL ONCE
Status: DISCONTINUED | OUTPATIENT
Start: 2019-07-26 | End: 2019-07-26 | Stop reason: HOSPADM

## 2019-07-26 RX ORDER — LIDOCAINE HYDROCHLORIDE 10 MG/ML
INJECTION, SOLUTION INFILTRATION; PERINEURAL
Status: DISCONTINUED
Start: 2019-07-26 | End: 2019-07-26 | Stop reason: HOSPADM

## 2019-07-26 RX ORDER — DOXYCYCLINE 100 MG/1
100 CAPSULE ORAL 2 TIMES DAILY
Qty: 14 CAPSULE | Refills: 0 | Status: SHIPPED | OUTPATIENT
Start: 2019-07-26 | End: 2019-08-02

## 2019-07-26 RX ORDER — LIDOCAINE HYDROCHLORIDE 10 MG/ML
10 INJECTION, SOLUTION EPIDURAL; INFILTRATION; INTRACAUDAL; PERINEURAL ONCE
Status: DISCONTINUED | OUTPATIENT
Start: 2019-07-26 | End: 2019-07-26

## 2019-07-26 RX ADMIN — TETANUS TOXOID, REDUCED DIPHTHERIA TOXOID AND ACELLULAR PERTUSSIS VACCINE, ADSORBED 0.5 ML: 5; 2.5; 8; 8; 2.5 SUSPENSION INTRAMUSCULAR at 09:00

## 2019-08-10 ENCOUNTER — HOSPITAL ENCOUNTER (EMERGENCY)
Facility: HOSPITAL | Age: 34
Discharge: HOME | End: 2019-08-11
Attending: FAMILY MEDICINE | Admitting: FAMILY MEDICINE
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
## 2019-08-10 DIAGNOSIS — L30.9 DERMATITIS: Primary | ICD-10-CM

## 2019-08-10 PROCEDURE — 96372 THER/PROPH/DIAG INJ SC/IM: CPT

## 2019-08-10 PROCEDURE — 25010000002 DEXAMETHASONE PER 1 MG: Performed by: PHYSICIAN ASSISTANT

## 2019-08-10 PROCEDURE — 99283 EMERGENCY DEPT VISIT LOW MDM: CPT

## 2019-08-10 RX ORDER — DOXYCYCLINE 100 MG/1
100 CAPSULE ORAL ONCE
Status: COMPLETED | OUTPATIENT
Start: 2019-08-10 | End: 2019-08-10

## 2019-08-10 RX ORDER — PERMETHRIN 50 MG/G
CREAM TOPICAL ONCE
Status: COMPLETED | OUTPATIENT
Start: 2019-08-10 | End: 2019-08-10

## 2019-08-10 RX ORDER — DEXAMETHASONE SODIUM PHOSPHATE 4 MG/ML
8 INJECTION, SOLUTION INTRA-ARTICULAR; INTRALESIONAL; INTRAMUSCULAR; INTRAVENOUS; SOFT TISSUE ONCE
Status: COMPLETED | OUTPATIENT
Start: 2019-08-10 | End: 2019-08-10

## 2019-08-10 RX ORDER — PREDNISONE 20 MG/1
20 TABLET ORAL DAILY
Qty: 7 TABLET | Refills: 0 | OUTPATIENT
Start: 2019-08-10 | End: 2020-01-11

## 2019-08-10 RX ORDER — DOXYCYCLINE 100 MG/1
100 CAPSULE ORAL 2 TIMES DAILY
Qty: 14 CAPSULE | Refills: 0 | OUTPATIENT
Start: 2019-08-10 | End: 2020-01-11

## 2019-08-10 RX ADMIN — DOXYCYCLINE 100 MG: 100 CAPSULE ORAL at 23:57

## 2019-08-10 RX ADMIN — DEXAMETHASONE SODIUM PHOSPHATE 8 MG: 4 INJECTION, SOLUTION INTRAMUSCULAR; INTRAVENOUS at 23:57

## 2019-08-10 RX ADMIN — TRIAMCINOLONE ACETONIDE: 1 OINTMENT TOPICAL at 23:59

## 2019-08-10 RX ADMIN — PERMETHRIN: 50 CREAM TOPICAL at 23:59

## 2019-08-11 VITALS
HEIGHT: 66 IN | BODY MASS INDEX: 32.14 KG/M2 | SYSTOLIC BLOOD PRESSURE: 126 MMHG | RESPIRATION RATE: 16 BRPM | OXYGEN SATURATION: 97 % | HEART RATE: 87 BPM | TEMPERATURE: 97.5 F | WEIGHT: 200 LBS | DIASTOLIC BLOOD PRESSURE: 91 MMHG

## 2019-08-11 NOTE — ED PROVIDER NOTES
Subjective     History provided by:  Patient  Rash   Location:  Leg and torso  Torso rash location:  Abd LLQ and abd RLQ  Leg rash location:  L leg and R leg  Quality: itchiness and redness    Severity:  Mild  Duration:  2 days  Timing:  Constant  Progression:  Worsening  Chronicity:  New  Relieved by:  Nothing  Associated symptoms: no abdominal pain and no fever        Review of Systems   Constitutional: Negative.  Negative for fever.   HENT: Negative.    Respiratory: Negative.    Cardiovascular: Negative.  Negative for chest pain.   Gastrointestinal: Negative.  Negative for abdominal pain.   Endocrine: Negative.    Genitourinary: Negative.  Negative for dysuria.   Skin: Positive for rash.   Neurological: Negative.    Psychiatric/Behavioral: Negative.    All other systems reviewed and are negative.      Past Medical History:   Diagnosis Date   • Anxiety    • Arthritis    • Bradycardia    • Fibromyalgia    • Heart disease    • Hepatitis C    • Myalgia        Allergies   Allergen Reactions   • Adhesive Tape      Steri Strip Adhesive Allergy    • Bactrim [Sulfamethoxazole-Trimethoprim] Hives, Itching and Swelling   • Codeine Hives, Itching and Swelling   • Keflex [Cephalexin] Hives, Itching and Swelling       Past Surgical History:   Procedure Laterality Date   • CHOLECYSTECTOMY     • NERVE SURGERY Right        No family history on file.    Social History     Socioeconomic History   • Marital status: Single     Spouse name: Not on file   • Number of children: Not on file   • Years of education: Not on file   • Highest education level: Not on file   Tobacco Use   • Smoking status: Current Every Day Smoker     Packs/day: 1.00     Years: 15.00     Pack years: 15.00     Types: Cigarettes   • Smokeless tobacco: Never Used   Substance and Sexual Activity   • Alcohol use: No   • Drug use: No   • Sexual activity: Defer           Objective   Physical Exam   Constitutional: She is oriented to person, place, and time. She  appears well-developed and well-nourished. No distress.   HENT:   Head: Normocephalic and atraumatic.   Right Ear: External ear normal.   Left Ear: External ear normal.   Nose: Nose normal.   Eyes: Conjunctivae are normal.   Neck: Normal range of motion. Neck supple. No JVD present. No tracheal deviation present.   Cardiovascular: Normal rate.   No murmur heard.  Pulmonary/Chest: Effort normal. No respiratory distress. She has no wheezes.   Abdominal: Soft. There is no tenderness.   Musculoskeletal: Normal range of motion. She exhibits no edema or deformity.   Neurological: She is alert and oriented to person, place, and time. No cranial nerve deficit.   Skin: Skin is warm and dry. Rash noted. She is not diaphoretic. No erythema. No pallor.   Psychiatric: She has a normal mood and affect. Her behavior is normal. Thought content normal.   Nursing note and vitals reviewed.      Procedures           ED Course                  MDM  Number of Diagnoses or Management Options  Dermatitis: new and does not require workup  Tick bite, initial encounter: new and does not require workup  Risk of Complications, Morbidity, and/or Mortality  Presenting problems: low  Diagnostic procedures: low  Management options: low    Patient Progress  Patient progress: stable        Final diagnoses:   Dermatitis   Tick bite, initial encounter            Cortez Rosenthal PA  08/11/19 0342

## 2019-12-17 ENCOUNTER — TRANSCRIBE ORDERS (OUTPATIENT)
Dept: ADMINISTRATIVE | Facility: HOSPITAL | Age: 34
End: 2019-12-17

## 2019-12-17 DIAGNOSIS — M54.16 LUMBAR RADICULOPATHY: Primary | ICD-10-CM

## 2020-01-11 ENCOUNTER — HOSPITAL ENCOUNTER (EMERGENCY)
Facility: HOSPITAL | Age: 35
Discharge: HOME | End: 2020-01-11
Attending: FAMILY MEDICINE | Admitting: FAMILY MEDICINE

## 2020-01-11 ENCOUNTER — APPOINTMENT (OUTPATIENT)
Dept: GENERAL RADIOLOGY | Facility: HOSPITAL | Age: 35
End: 2020-01-11

## 2020-01-11 VITALS
BODY MASS INDEX: 34.53 KG/M2 | RESPIRATION RATE: 20 BRPM | TEMPERATURE: 101.3 F | WEIGHT: 220 LBS | SYSTOLIC BLOOD PRESSURE: 109 MMHG | OXYGEN SATURATION: 94 % | DIASTOLIC BLOOD PRESSURE: 53 MMHG | HEART RATE: 109 BPM | HEIGHT: 67 IN

## 2020-01-11 DIAGNOSIS — J02.0 STREP THROAT: Primary | ICD-10-CM

## 2020-01-11 LAB
FLUAV AG NPH QL: NEGATIVE
FLUBV AG NPH QL IA: NEGATIVE
S PYO AG THROAT QL: POSITIVE

## 2020-01-11 PROCEDURE — 96372 THER/PROPH/DIAG INJ SC/IM: CPT

## 2020-01-11 PROCEDURE — 87804 INFLUENZA ASSAY W/OPTIC: CPT | Performed by: FAMILY MEDICINE

## 2020-01-11 PROCEDURE — 71045 X-RAY EXAM CHEST 1 VIEW: CPT

## 2020-01-11 PROCEDURE — 25010000002 PENICILLIN G BENZATHINE PER 1200000 UNITS: Performed by: NURSE PRACTITIONER

## 2020-01-11 PROCEDURE — 87880 STREP A ASSAY W/OPTIC: CPT | Performed by: FAMILY MEDICINE

## 2020-01-11 PROCEDURE — 99284 EMERGENCY DEPT VISIT MOD MDM: CPT

## 2020-01-11 RX ORDER — IBUPROFEN 400 MG/1
400 TABLET ORAL ONCE
Status: COMPLETED | OUTPATIENT
Start: 2020-01-11 | End: 2020-01-11

## 2020-01-11 RX ORDER — DEXTROMETHORPHAN HYDROBROMIDE AND PROMETHAZINE HYDROCHLORIDE 15; 6.25 MG/5ML; MG/5ML
5 SYRUP ORAL 4 TIMES DAILY PRN
Qty: 100 ML | Refills: 0 | OUTPATIENT
Start: 2020-01-11 | End: 2021-05-25

## 2020-01-11 RX ORDER — ACETAMINOPHEN 325 MG/1
650 TABLET ORAL ONCE
Status: COMPLETED | OUTPATIENT
Start: 2020-01-11 | End: 2020-01-11

## 2020-01-11 RX ADMIN — IBUPROFEN 400 MG: 400 TABLET, FILM COATED ORAL at 05:47

## 2020-01-11 RX ADMIN — HYDROCODONE POLISTIREX AND CHLORPHENIRAMINE POLISTIREX 5 ML: 10; 8 SUSPENSION, EXTENDED RELEASE ORAL at 05:47

## 2020-01-11 RX ADMIN — ACETAMINOPHEN 650 MG: 325 TABLET ORAL at 06:57

## 2020-01-11 RX ADMIN — PENICILLIN G BENZATHINE 1.2 MILLION UNITS: 1200000 INJECTION, SUSPENSION INTRAMUSCULAR at 06:26

## 2020-01-11 NOTE — ED NOTES
Discharge instructions reviewed with patient, patient instructed to return to ED if symptoms worsen or if any new problems arise. Patient verbalizes understanding of discharge instructions, patient ambulatory out of ED. No acute distress noted.       Chetna Love RN  01/11/20 0702

## 2020-01-11 NOTE — ED PROVIDER NOTES
Subjective     History provided by:  Patient   used: No    URI   Presenting symptoms: cough, fever and sore throat    Severity:  Moderate  Onset quality:  Sudden  Duration:  1 day  Timing:  Constant  Progression:  Worsening  Chronicity:  New  Relieved by:  Nothing  Worsened by:  Nothing  Ineffective treatments:  None tried  Associated symptoms: headaches and myalgias    Associated symptoms: no arthralgias    Risk factors: not elderly, no chronic cardiac disease, no immunosuppression, no recent illness and no recent travel        Review of Systems   Constitutional: Positive for chills and fever.   HENT: Positive for sore throat.    Eyes: Negative.    Respiratory: Positive for cough.    Cardiovascular: Negative.    Gastrointestinal: Negative.    Endocrine: Negative.    Genitourinary: Negative.    Musculoskeletal: Positive for myalgias. Negative for arthralgias.   Skin: Negative.    Allergic/Immunologic: Negative.    Neurological: Positive for headaches.   Hematological: Negative.    Psychiatric/Behavioral: Negative.    All other systems reviewed and are negative.      Past Medical History:   Diagnosis Date   • Anxiety    • Arthritis    • Bradycardia    • Fibromyalgia    • Heart disease    • Hepatitis C    • Myalgia        Allergies   Allergen Reactions   • Adhesive Tape      Steri Strip Adhesive Allergy    • Bactrim [Sulfamethoxazole-Trimethoprim] Hives, Itching and Swelling   • Codeine Hives, Itching and Swelling   • Keflex [Cephalexin] Hives, Itching and Swelling       Past Surgical History:   Procedure Laterality Date   • CHOLECYSTECTOMY     • NERVE SURGERY Right        No family history on file.    Social History     Socioeconomic History   • Marital status: Single     Spouse name: Not on file   • Number of children: Not on file   • Years of education: Not on file   • Highest education level: Not on file   Tobacco Use   • Smoking status: Current Every Day Smoker     Packs/day: 1.00     Years:  15.00     Pack years: 15.00     Types: Cigarettes   • Smokeless tobacco: Never Used   Substance and Sexual Activity   • Alcohol use: No   • Drug use: No   • Sexual activity: Defer           Objective   Physical Exam   Constitutional: She is oriented to person, place, and time. She appears well-developed and well-nourished.   HENT:   Head: Normocephalic.   Mouth/Throat: Oropharyngeal exudate, posterior oropharyngeal edema and posterior oropharyngeal erythema present.   Eyes: Pupils are equal, round, and reactive to light. EOM are normal.   Neck: Normal range of motion. Neck supple.   Cardiovascular: Normal rate, regular rhythm, normal heart sounds and intact distal pulses.   Pulmonary/Chest: Effort normal. She has wheezes.   Abdominal: Soft. Bowel sounds are normal.   Musculoskeletal: Normal range of motion.   Neurological: She is alert and oriented to person, place, and time.   Skin: Skin is warm. Capillary refill takes less than 2 seconds.   Psychiatric: She has a normal mood and affect. Her behavior is normal. Judgment and thought content normal.   Nursing note and vitals reviewed.      Procedures           ED Course                      Kristi Coma Scale Score: 15                          MDM  Number of Diagnoses or Management Options  Strep throat: new and requires workup     Amount and/or Complexity of Data Reviewed  Clinical lab tests: ordered and reviewed  Tests in the radiology section of CPT®: reviewed and ordered  Tests in the medicine section of CPT®: reviewed and ordered    Risk of Complications, Morbidity, and/or Mortality  Presenting problems: moderate  Diagnostic procedures: moderate  Management options: moderate    Patient Progress  Patient progress: improved      Final diagnoses:   Strep throat            Mary Mclean, APRN  01/11/20 0610

## 2020-02-26 ENCOUNTER — HOSPITAL ENCOUNTER (OUTPATIENT)
Dept: CARDIOLOGY | Facility: HOSPITAL | Age: 35
Discharge: HOME | End: 2020-02-26
Admitting: NURSE PRACTITIONER

## 2020-02-26 ENCOUNTER — TRANSCRIBE ORDERS (OUTPATIENT)
Dept: ADMINISTRATIVE | Facility: HOSPITAL | Age: 35
End: 2020-02-26

## 2020-02-26 DIAGNOSIS — F19.10 ANTIDEPRESSANT TYPE ABUSE, CONTINUOUS (HCC): ICD-10-CM

## 2020-02-26 DIAGNOSIS — F19.10 ANTIDEPRESSANT TYPE ABUSE, CONTINUOUS (HCC): Primary | ICD-10-CM

## 2020-02-26 PROCEDURE — 93005 ELECTROCARDIOGRAM TRACING: CPT | Performed by: NURSE PRACTITIONER

## 2020-02-26 PROCEDURE — 93010 ELECTROCARDIOGRAM REPORT: CPT | Performed by: INTERNAL MEDICINE

## 2020-02-28 ENCOUNTER — APPOINTMENT (OUTPATIENT)
Dept: GENERAL RADIOLOGY | Facility: HOSPITAL | Age: 35
End: 2020-02-28

## 2020-02-28 ENCOUNTER — APPOINTMENT (OUTPATIENT)
Dept: CT IMAGING | Facility: HOSPITAL | Age: 35
End: 2020-02-28

## 2020-02-28 ENCOUNTER — HOSPITAL ENCOUNTER (EMERGENCY)
Facility: HOSPITAL | Age: 35
Discharge: HOME | End: 2020-02-28
Attending: EMERGENCY MEDICINE | Admitting: EMERGENCY MEDICINE

## 2020-02-28 VITALS
WEIGHT: 220 LBS | RESPIRATION RATE: 18 BRPM | SYSTOLIC BLOOD PRESSURE: 126 MMHG | DIASTOLIC BLOOD PRESSURE: 83 MMHG | BODY MASS INDEX: 35.36 KG/M2 | TEMPERATURE: 98.4 F | HEIGHT: 66 IN | HEART RATE: 64 BPM | OXYGEN SATURATION: 99 %

## 2020-02-28 DIAGNOSIS — M54.50 ACUTE MIDLINE LOW BACK PAIN, UNSPECIFIED WHETHER SCIATICA PRESENT: Primary | ICD-10-CM

## 2020-02-28 LAB — B-HCG UR QL: NEGATIVE

## 2020-02-28 PROCEDURE — 96372 THER/PROPH/DIAG INJ SC/IM: CPT

## 2020-02-28 PROCEDURE — 99284 EMERGENCY DEPT VISIT MOD MDM: CPT

## 2020-02-28 PROCEDURE — 72110 X-RAY EXAM L-2 SPINE 4/>VWS: CPT

## 2020-02-28 PROCEDURE — 25010000002 METHYLPREDNISOLONE PER 80 MG: Performed by: NURSE PRACTITIONER

## 2020-02-28 PROCEDURE — 72131 CT LUMBAR SPINE W/O DYE: CPT | Performed by: RADIOLOGY

## 2020-02-28 PROCEDURE — 81025 URINE PREGNANCY TEST: CPT | Performed by: NURSE PRACTITIONER

## 2020-02-28 PROCEDURE — 72131 CT LUMBAR SPINE W/O DYE: CPT

## 2020-02-28 PROCEDURE — 72110 X-RAY EXAM L-2 SPINE 4/>VWS: CPT | Performed by: RADIOLOGY

## 2020-02-28 RX ORDER — METHYLPREDNISOLONE ACETATE 80 MG/ML
80 INJECTION, SUSPENSION INTRA-ARTICULAR; INTRALESIONAL; INTRAMUSCULAR; SOFT TISSUE ONCE
Status: COMPLETED | OUTPATIENT
Start: 2020-02-28 | End: 2020-02-28

## 2020-02-28 RX ORDER — IBUPROFEN 400 MG/1
800 TABLET ORAL ONCE
Status: COMPLETED | OUTPATIENT
Start: 2020-02-28 | End: 2020-02-28

## 2020-02-28 RX ADMIN — METHYLPREDNISOLONE ACETATE 80 MG: 80 INJECTION, SUSPENSION INTRA-ARTICULAR; INTRALESIONAL; INTRAMUSCULAR; SOFT TISSUE at 15:40

## 2020-02-28 RX ADMIN — IBUPROFEN 800 MG: 400 TABLET, FILM COATED ORAL at 15:37

## 2020-05-21 ENCOUNTER — APPOINTMENT (OUTPATIENT)
Dept: GENERAL RADIOLOGY | Facility: HOSPITAL | Age: 35
End: 2020-05-21

## 2020-05-21 ENCOUNTER — HOSPITAL ENCOUNTER (EMERGENCY)
Facility: HOSPITAL | Age: 35
Discharge: LEFT BEFORE DISCHARGE | End: 2020-05-21
Attending: EMERGENCY MEDICINE | Admitting: EMERGENCY MEDICINE

## 2020-05-21 VITALS
BODY MASS INDEX: 34.53 KG/M2 | OXYGEN SATURATION: 98 % | HEIGHT: 67 IN | SYSTOLIC BLOOD PRESSURE: 93 MMHG | RESPIRATION RATE: 18 BRPM | DIASTOLIC BLOOD PRESSURE: 50 MMHG | HEART RATE: 60 BPM | TEMPERATURE: 98.8 F | WEIGHT: 220 LBS

## 2020-05-21 DIAGNOSIS — R10.9 ABDOMINAL PAIN, UNSPECIFIED ABDOMINAL LOCATION: ICD-10-CM

## 2020-05-21 DIAGNOSIS — J06.9 UPPER RESPIRATORY TRACT INFECTION, UNSPECIFIED TYPE: Primary | ICD-10-CM

## 2020-05-21 LAB
6-ACETYL MORPHINE: POSITIVE
AMPHET+METHAMPHET UR QL: POSITIVE
B-HCG UR QL: NEGATIVE
BARBITURATES UR QL SCN: NEGATIVE
BENZODIAZ UR QL SCN: POSITIVE
BILIRUB UR QL STRIP: NEGATIVE
BUPRENORPHINE SERPL-MCNC: NEGATIVE NG/ML
CANNABINOIDS SERPL QL: POSITIVE
CLARITY UR: CLEAR
COCAINE UR QL: POSITIVE
COLOR UR: ABNORMAL
FLUAV AG NPH QL: NEGATIVE
FLUBV AG NPH QL IA: NEGATIVE
GLUCOSE UR STRIP-MCNC: NEGATIVE MG/DL
HGB UR QL STRIP.AUTO: NEGATIVE
KETONES UR QL STRIP: ABNORMAL
LEUKOCYTE ESTERASE UR QL STRIP.AUTO: NEGATIVE
METHADONE UR QL SCN: NEGATIVE
NITRITE UR QL STRIP: NEGATIVE
OPIATES UR QL: POSITIVE
OXYCODONE UR QL SCN: NEGATIVE
PCP UR QL SCN: NEGATIVE
PH UR STRIP.AUTO: 5.5 [PH] (ref 5–8)
PROT UR QL STRIP: NEGATIVE
S PYO AG THROAT QL: NEGATIVE
SP GR UR STRIP: 1.03 (ref 1–1.03)
UROBILINOGEN UR QL STRIP: ABNORMAL

## 2020-05-21 PROCEDURE — 87804 INFLUENZA ASSAY W/OPTIC: CPT | Performed by: NURSE PRACTITIONER

## 2020-05-21 PROCEDURE — 81003 URINALYSIS AUTO W/O SCOPE: CPT | Performed by: NURSE PRACTITIONER

## 2020-05-21 PROCEDURE — 71045 X-RAY EXAM CHEST 1 VIEW: CPT | Performed by: RADIOLOGY

## 2020-05-21 PROCEDURE — 80307 DRUG TEST PRSMV CHEM ANLYZR: CPT | Performed by: NURSE PRACTITIONER

## 2020-05-21 PROCEDURE — 81025 URINE PREGNANCY TEST: CPT | Performed by: NURSE PRACTITIONER

## 2020-05-21 PROCEDURE — 99283 EMERGENCY DEPT VISIT LOW MDM: CPT

## 2020-05-21 PROCEDURE — 87081 CULTURE SCREEN ONLY: CPT | Performed by: NURSE PRACTITIONER

## 2020-05-21 PROCEDURE — 71045 X-RAY EXAM CHEST 1 VIEW: CPT

## 2020-05-21 PROCEDURE — 93005 ELECTROCARDIOGRAM TRACING: CPT | Performed by: NURSE PRACTITIONER

## 2020-05-21 PROCEDURE — 93010 ELECTROCARDIOGRAM REPORT: CPT | Performed by: INTERNAL MEDICINE

## 2020-05-21 PROCEDURE — 87880 STREP A ASSAY W/OPTIC: CPT | Performed by: NURSE PRACTITIONER

## 2020-05-21 RX ORDER — SODIUM CHLORIDE 0.9 % (FLUSH) 0.9 %
10 SYRINGE (ML) INJECTION AS NEEDED
Status: DISCONTINUED | OUTPATIENT
Start: 2020-05-21 | End: 2020-05-21 | Stop reason: HOSPADM

## 2020-05-21 NOTE — ED NOTES
Called lead RN to place ultrasound guided IV. Lead RN states call picc line nurse. Provider aware and states okay to call picc nurse. Called picc nurse and left voice message.     Sarah Hunt RN  05/21/20 7030

## 2020-05-21 NOTE — ED PROVIDER NOTES
Subjective   Patient reports she awoke last night with off and on abdominal pain.   Reports she's had cough and some dyspnea for past 2 weeks. Denies fever.       History provided by:  Patient   used: No    Shortness of Breath   Severity:  Moderate  Onset quality:  Sudden  Duration:  3 days  Timing:  Constant  Progression:  Waxing and waning  Chronicity:  New  Context: not activity, not emotional upset, not fumes, not pollens and not strong odors    Relieved by:  Nothing  Worsened by:  Nothing  Ineffective treatments:  None tried  Associated symptoms: no abdominal pain, no claudication, no cough, no diaphoresis, no fever, no hemoptysis, no neck pain, no PND, no syncope, no swollen glands, no vomiting and no wheezing    Risk factors: no recent alcohol use, no family hx of DVT, no obesity, no recent surgery and no tobacco use        Review of Systems   Constitutional: Negative.  Negative for diaphoresis and fever.   HENT: Negative.    Eyes: Negative.    Respiratory: Positive for shortness of breath. Negative for cough, hemoptysis and wheezing.    Cardiovascular: Negative.  Negative for claudication, syncope and PND.   Gastrointestinal: Negative.  Negative for abdominal pain and vomiting.   Endocrine: Negative.    Genitourinary: Negative.    Musculoskeletal: Negative.  Negative for neck pain.   Skin: Negative.    Allergic/Immunologic: Negative.    Neurological: Negative.    Hematological: Negative.    Psychiatric/Behavioral: Negative.        Past Medical History:   Diagnosis Date   • Anxiety    • Arthritis    • Bradycardia    • Fibromyalgia    • Heart disease    • Hepatitis C    • Myalgia        Allergies   Allergen Reactions   • Adhesive Tape      Steri Strip Adhesive Allergy    • Bactrim [Sulfamethoxazole-Trimethoprim] Hives, Itching and Swelling   • Codeine Hives, Itching and Swelling   • Keflex [Cephalexin] Hives, Itching and Swelling       Past Surgical History:   Procedure Laterality Date   •  CHOLECYSTECTOMY     • NERVE SURGERY Right        History reviewed. No pertinent family history.    Social History     Socioeconomic History   • Marital status: Single     Spouse name: Not on file   • Number of children: Not on file   • Years of education: Not on file   • Highest education level: Not on file   Tobacco Use   • Smoking status: Current Every Day Smoker     Packs/day: 0.50     Years: 15.00     Pack years: 7.50     Types: Cigarettes   • Smokeless tobacco: Never Used   Substance and Sexual Activity   • Alcohol use: No   • Drug use: No   • Sexual activity: Defer           Objective   Physical Exam   Constitutional: She is oriented to person, place, and time. She appears well-developed and well-nourished.   HENT:   Head: Normocephalic.   Right Ear: External ear normal.   Left Ear: External ear normal.   Mouth/Throat: Oropharynx is clear and moist.   Eyes: Pupils are equal, round, and reactive to light. Conjunctivae and EOM are normal.   Neck: Normal range of motion. Neck supple.   Cardiovascular: Normal rate, regular rhythm, normal heart sounds and intact distal pulses.   Pulmonary/Chest: Effort normal and breath sounds normal.   Abdominal: Soft. Bowel sounds are normal. There is generalized tenderness.   Musculoskeletal: Normal range of motion.   Neurological: She is alert and oriented to person, place, and time.   Skin: Skin is warm and dry. Capillary refill takes less than 2 seconds.   Psychiatric: She has a normal mood and affect. Her behavior is normal. Thought content normal.   Nursing note and vitals reviewed.      Procedures           ED Course  ED Course as of May 21 1915   Thu May 21, 2020   1340 ER statff and lab unable to obtain blood.  Had ordered to have PICC nurse do a midline but patient refuses further sticks. Explained that we needed to get blood to perform proper evaluation. Patient states she feels better and wants to sign out. Discussed risks of leaving AMA including permament  impairment, loss of lifestyle, loss of limb, or death. Patient is alert and oriented and verbalizes understanding and states she still wants to sign out AMA    [MICHEAL]      ED Course User Index  [MICHEAL] Enoch Rosenthal, APRN                                           [facility]    Final diagnoses:   Upper respiratory tract infection, unspecified type   Abdominal pain, unspecified abdominal location            Enoch Rosenthal, APRN  05/21/20 1915

## 2020-05-21 NOTE — ED NOTES
Lab unsuccessful at obtaining blood and patient states she does not want to be stuck again. Provider aware.     Sarah Hunt, RN  05/21/20 3239

## 2020-05-21 NOTE — ED NOTES
Second RN attempted IV access and was unsuccessful. Third RN attempting now.     Sarah Hunt, RN  05/21/20 8197

## 2020-05-21 NOTE — ED NOTES
Third RN unsuccessful at obtaining IV access or blood work. Called lab to stick patient.     Sarah Hunt, RN  05/21/20 9670

## 2020-05-21 NOTE — ED NOTES
Unsuccessful at obtaining IV access/blood. Will ask another RN to attempt.     Sarah Hunt, RN  05/21/20 8167

## 2020-05-21 NOTE — ED NOTES
"Provider in room discussing plan of care. Patient wishes to sign out against medical advice and verbalizes understanding of risks and benefits of signing out against medical advice. She states \"I've been stuck a hundred times and I'm just ready to leave.\"     Sarah Hunt, RN  05/21/20 6661    "

## 2020-05-23 LAB — BACTERIA SPEC AEROBE CULT: NORMAL

## 2020-10-15 ENCOUNTER — APPOINTMENT (OUTPATIENT)
Dept: GENERAL RADIOLOGY | Facility: HOSPITAL | Age: 35
End: 2020-10-15

## 2020-10-15 ENCOUNTER — HOSPITAL ENCOUNTER (EMERGENCY)
Facility: HOSPITAL | Age: 35
Discharge: HOME | End: 2020-10-15
Attending: FAMILY MEDICINE | Admitting: FAMILY MEDICINE

## 2020-10-15 VITALS
TEMPERATURE: 98.5 F | SYSTOLIC BLOOD PRESSURE: 128 MMHG | OXYGEN SATURATION: 100 % | DIASTOLIC BLOOD PRESSURE: 91 MMHG | HEIGHT: 66 IN | WEIGHT: 220 LBS | HEART RATE: 91 BPM | RESPIRATION RATE: 18 BRPM | BODY MASS INDEX: 35.36 KG/M2

## 2020-10-15 DIAGNOSIS — S93.401A SPRAIN OF RIGHT ANKLE, UNSPECIFIED LIGAMENT, INITIAL ENCOUNTER: Primary | ICD-10-CM

## 2020-10-15 PROCEDURE — 73630 X-RAY EXAM OF FOOT: CPT | Performed by: RADIOLOGY

## 2020-10-15 PROCEDURE — 73610 X-RAY EXAM OF ANKLE: CPT | Performed by: RADIOLOGY

## 2020-10-15 PROCEDURE — 99283 EMERGENCY DEPT VISIT LOW MDM: CPT

## 2020-10-15 PROCEDURE — 73630 X-RAY EXAM OF FOOT: CPT

## 2020-10-15 PROCEDURE — 73610 X-RAY EXAM OF ANKLE: CPT

## 2020-10-15 RX ORDER — IBUPROFEN 800 MG/1
800 TABLET ORAL EVERY 8 HOURS PRN
Qty: 20 TABLET | Refills: 0 | OUTPATIENT
Start: 2020-10-15 | End: 2021-05-25

## 2020-10-15 RX ORDER — IBUPROFEN 400 MG/1
800 TABLET ORAL ONCE
Status: COMPLETED | OUTPATIENT
Start: 2020-10-15 | End: 2020-10-15

## 2020-10-15 RX ADMIN — IBUPROFEN 800 MG: 400 TABLET ORAL at 13:01

## 2020-10-15 NOTE — ED PROVIDER NOTES
Subjective   Patient is a 34-year-old female presents the ED after sustaining an injury to her right ankle.  She states she was walking through the yard carrying a concrete block when she twisted her right ankle.  She is complaining of moderate swelling and pain along the lateral aspect of her ankle.  She states is worse with weightbearing activities.  She denies paresthesias.      History provided by:  Patient   used: No    Ankle Injury  Location:  Right ankle  Severity:  Moderate  Onset quality:  Sudden  Duration:  2 hours  Timing:  Constant  Progression:  Worsening  Chronicity:  New  Context:  Twisted ankle  Associated symptoms: no abdominal pain, no chest pain, no congestion, no cough, no diarrhea, no ear pain, no fatigue, no fever, no headaches, no loss of consciousness, no myalgias, no nausea, no rash, no rhinorrhea, no shortness of breath, no sore throat, no vomiting and no wheezing        Review of Systems   Constitutional: Negative.  Negative for fatigue and fever.   HENT: Negative.  Negative for congestion, ear pain, rhinorrhea and sore throat.    Eyes: Negative.  Negative for pain, discharge, redness and itching.   Respiratory: Negative.  Negative for cough, shortness of breath and wheezing.    Cardiovascular: Negative.  Negative for chest pain.   Gastrointestinal: Negative.  Negative for abdominal pain, diarrhea, nausea and vomiting.   Endocrine: Negative.    Genitourinary: Negative.  Negative for dysuria, flank pain, frequency and urgency.   Musculoskeletal: Positive for arthralgias and joint swelling. Negative for myalgias.   Skin: Negative.  Negative for rash.   Neurological: Negative.  Negative for loss of consciousness and headaches.   Hematological: Negative.    Psychiatric/Behavioral: Negative.    All other systems reviewed and are negative.      Past Medical History:   Diagnosis Date   • Anxiety    • Arthritis    • Bradycardia    • Fibromyalgia    • Heart disease    •  Hepatitis C    • Myalgia        Allergies   Allergen Reactions   • Adhesive Tape      Steri Strip Adhesive Allergy    • Bactrim [Sulfamethoxazole-Trimethoprim] Hives, Itching and Swelling   • Codeine Hives, Itching and Swelling   • Keflex [Cephalexin] Hives, Itching and Swelling       Past Surgical History:   Procedure Laterality Date   • CHOLECYSTECTOMY     • NERVE SURGERY Right        History reviewed. No pertinent family history.    Social History     Socioeconomic History   • Marital status: Single     Spouse name: Not on file   • Number of children: Not on file   • Years of education: Not on file   • Highest education level: Not on file   Tobacco Use   • Smoking status: Current Every Day Smoker     Packs/day: 0.50     Years: 15.00     Pack years: 7.50     Types: Cigarettes   • Smokeless tobacco: Never Used   Substance and Sexual Activity   • Alcohol use: No   • Drug use: No   • Sexual activity: Defer           Objective   Physical Exam  Vitals signs and nursing note reviewed.   Constitutional:       General: She is not in acute distress.     Appearance: She is well-developed. She is not diaphoretic.   HENT:      Head: Normocephalic and atraumatic.      Right Ear: External ear normal.      Left Ear: External ear normal.      Nose: Nose normal.      Mouth/Throat:      Mouth: Mucous membranes are moist.      Pharynx: Oropharynx is clear.   Eyes:      Conjunctiva/sclera: Conjunctivae normal.      Pupils: Pupils are equal, round, and reactive to light.   Neck:      Musculoskeletal: Normal range of motion and neck supple.      Vascular: No JVD.      Trachea: No tracheal deviation.   Cardiovascular:      Rate and Rhythm: Normal rate and regular rhythm.      Heart sounds: Normal heart sounds. No murmur.   Pulmonary:      Effort: Pulmonary effort is normal. No respiratory distress.      Breath sounds: Normal breath sounds. No wheezing.   Abdominal:      General: Bowel sounds are normal.      Palpations: Abdomen is soft.       Tenderness: There is no abdominal tenderness.   Musculoskeletal:         General: No deformity.      Right ankle: She exhibits decreased range of motion and swelling. She exhibits no deformity and no laceration. Tenderness. Lateral malleolus tenderness found.   Skin:     General: Skin is warm and dry.      Coloration: Skin is not pale.      Findings: No erythema or rash.   Neurological:      Mental Status: She is alert and oriented to person, place, and time.      Cranial Nerves: No cranial nerve deficit.   Psychiatric:         Behavior: Behavior normal.         Thought Content: Thought content normal.         Splint - Cast - Strapping    Date/Time: 10/15/2020 9:29 PM  Performed by: Peyton May PA-C  Authorized by: Peyton Hong DO     Consent:     Consent obtained:  Verbal    Consent given by:  Patient    Risks discussed:  Discoloration, numbness, pain and swelling    Alternatives discussed:  No treatment  Pre-procedure details:     Sensation:  Normal    Skin color:  Pink  Procedure details:     Laterality:  Right    Location:  Ankle    Ankle:  R ankle    Splint type:  CAM walker boot    Supplies:  Prefabricated splint  Post-procedure details:     Pain:  Improved    Sensation:  Normal    Skin color:  Pink    Patient tolerance of procedure:  Tolerated well, no immediate complications               ED Course  ED Course as of Oct 15 2130   Thu Oct 15, 2020   1406 IMPRESSION:     1. Marked lateral soft tissue swelling and small joint effusion.  2. No displaced fracture identified.     This report was finalized on 10/15/2020 1:56 PM by Dr. José Bennett MD.   XR Ankle 3+ View Right [MH]   1406 IMPRESSION:  No acute osseous or articular abnormalities.     This report was finalized on 10/15/2020 1:56 PM by Dr. José Bennett MD.      XR Foot 3+ View Right [MH]      ED Course User Index  [MH] Peyton May PA-C                                           [facility]    Final diagnoses:   Sprain of  right ankle, unspecified ligament, initial encounter            Peyton May PA-C  10/15/20 4297

## 2021-01-26 ENCOUNTER — TRANSCRIBE ORDERS (OUTPATIENT)
Dept: ADMINISTRATIVE | Facility: HOSPITAL | Age: 36
End: 2021-01-26

## 2021-01-26 DIAGNOSIS — M51.36 DEGENERATION OF LUMBAR INTERVERTEBRAL DISC: Primary | ICD-10-CM

## 2021-02-09 ENCOUNTER — APPOINTMENT (OUTPATIENT)
Dept: MRI IMAGING | Facility: HOSPITAL | Age: 36
End: 2021-02-09

## 2021-02-25 ENCOUNTER — HOSPITAL ENCOUNTER (OUTPATIENT)
Dept: MRI IMAGING | Facility: HOSPITAL | Age: 36
Discharge: HOME | End: 2021-02-25
Admitting: ANESTHESIOLOGY

## 2021-02-25 DIAGNOSIS — M51.36 DEGENERATION OF LUMBAR INTERVERTEBRAL DISC: ICD-10-CM

## 2021-02-25 PROCEDURE — 72148 MRI LUMBAR SPINE W/O DYE: CPT | Performed by: RADIOLOGY

## 2021-02-25 PROCEDURE — 72148 MRI LUMBAR SPINE W/O DYE: CPT

## 2021-05-25 ENCOUNTER — APPOINTMENT (OUTPATIENT)
Dept: ULTRASOUND IMAGING | Facility: HOSPITAL | Age: 36
End: 2021-05-25

## 2021-05-25 ENCOUNTER — APPOINTMENT (OUTPATIENT)
Dept: GENERAL RADIOLOGY | Facility: HOSPITAL | Age: 36
End: 2021-05-25

## 2021-05-25 ENCOUNTER — HOSPITAL ENCOUNTER (EMERGENCY)
Facility: HOSPITAL | Age: 36
Discharge: HOME | End: 2021-05-26
Attending: EMERGENCY MEDICINE | Admitting: EMERGENCY MEDICINE

## 2021-05-25 VITALS
HEIGHT: 66 IN | RESPIRATION RATE: 18 BRPM | BODY MASS INDEX: 35.36 KG/M2 | SYSTOLIC BLOOD PRESSURE: 118 MMHG | TEMPERATURE: 98.5 F | DIASTOLIC BLOOD PRESSURE: 73 MMHG | WEIGHT: 220 LBS | OXYGEN SATURATION: 100 % | HEART RATE: 95 BPM

## 2021-05-25 DIAGNOSIS — L03.115 CELLULITIS OF RIGHT FOOT: Primary | ICD-10-CM

## 2021-05-25 LAB
6-ACETYL MORPHINE: NEGATIVE
ALBUMIN SERPL-MCNC: 4.12 G/DL (ref 3.5–5.2)
ALBUMIN/GLOB SERPL: 1.1 G/DL
ALP SERPL-CCNC: 72 U/L (ref 39–117)
ALT SERPL W P-5'-P-CCNC: 32 U/L (ref 1–33)
AMPHET+METHAMPHET UR QL: POSITIVE
ANION GAP SERPL CALCULATED.3IONS-SCNC: 9.6 MMOL/L (ref 5–15)
AST SERPL-CCNC: 39 U/L (ref 1–32)
BACTERIA UR QL AUTO: ABNORMAL /HPF
BARBITURATES UR QL SCN: NEGATIVE
BASOPHILS # BLD AUTO: 0.04 10*3/MM3 (ref 0–0.2)
BASOPHILS NFR BLD AUTO: 0.5 % (ref 0–1.5)
BENZODIAZ UR QL SCN: NEGATIVE
BILIRUB SERPL-MCNC: 0.2 MG/DL (ref 0–1.2)
BILIRUB UR QL STRIP: NEGATIVE
BUN SERPL-MCNC: 7 MG/DL (ref 6–20)
BUN/CREAT SERPL: 10.4 (ref 7–25)
BUPRENORPHINE SERPL-MCNC: NEGATIVE NG/ML
CALCIUM SPEC-SCNC: 9.1 MG/DL (ref 8.6–10.5)
CANNABINOIDS SERPL QL: POSITIVE
CHLORIDE SERPL-SCNC: 102 MMOL/L (ref 98–107)
CLARITY UR: CLEAR
CO2 SERPL-SCNC: 26.4 MMOL/L (ref 22–29)
COCAINE UR QL: NEGATIVE
COLOR UR: YELLOW
CREAT SERPL-MCNC: 0.67 MG/DL (ref 0.57–1)
CRP SERPL-MCNC: 1.28 MG/DL (ref 0–0.5)
D-LACTATE SERPL-SCNC: 1.5 MMOL/L (ref 0.5–2)
DEPRECATED RDW RBC AUTO: 38.6 FL (ref 37–54)
EOSINOPHIL # BLD AUTO: 0.13 10*3/MM3 (ref 0–0.4)
EOSINOPHIL NFR BLD AUTO: 1.6 % (ref 0.3–6.2)
ERYTHROCYTE [DISTWIDTH] IN BLOOD BY AUTOMATED COUNT: 12.3 % (ref 12.3–15.4)
GFR SERPL CREATININE-BSD FRML MDRD: 101 ML/MIN/1.73
GLOBULIN UR ELPH-MCNC: 3.7 GM/DL
GLUCOSE SERPL-MCNC: 106 MG/DL (ref 65–99)
GLUCOSE UR STRIP-MCNC: NEGATIVE MG/DL
HCG SERPL QL: NEGATIVE
HCT VFR BLD AUTO: 36.4 % (ref 34–46.6)
HGB BLD-MCNC: 11.8 G/DL (ref 12–15.9)
HGB UR QL STRIP.AUTO: ABNORMAL
HOLD SPECIMEN: NORMAL
HYALINE CASTS UR QL AUTO: ABNORMAL /LPF
IMM GRANULOCYTES # BLD AUTO: 0.02 10*3/MM3 (ref 0–0.05)
IMM GRANULOCYTES NFR BLD AUTO: 0.2 % (ref 0–0.5)
KETONES UR QL STRIP: NEGATIVE
LEUKOCYTE ESTERASE UR QL STRIP.AUTO: NEGATIVE
LYMPHOCYTES # BLD AUTO: 3.31 10*3/MM3 (ref 0.7–3.1)
LYMPHOCYTES NFR BLD AUTO: 40.7 % (ref 19.6–45.3)
MCH RBC QN AUTO: 27.8 PG (ref 26.6–33)
MCHC RBC AUTO-ENTMCNC: 32.4 G/DL (ref 31.5–35.7)
MCV RBC AUTO: 85.8 FL (ref 79–97)
METHADONE UR QL SCN: NEGATIVE
MONOCYTES # BLD AUTO: 0.58 10*3/MM3 (ref 0.1–0.9)
MONOCYTES NFR BLD AUTO: 7.1 % (ref 5–12)
NEUTROPHILS NFR BLD AUTO: 4.05 10*3/MM3 (ref 1.7–7)
NEUTROPHILS NFR BLD AUTO: 49.9 % (ref 42.7–76)
NITRITE UR QL STRIP: NEGATIVE
NRBC BLD AUTO-RTO: 0 /100 WBC (ref 0–0.2)
OPIATES UR QL: POSITIVE
OXYCODONE UR QL SCN: POSITIVE
PCP UR QL SCN: NEGATIVE
PH UR STRIP.AUTO: <=5 [PH] (ref 5–8)
PLATELET # BLD AUTO: 249 10*3/MM3 (ref 140–450)
PMV BLD AUTO: 8.2 FL (ref 6–12)
POTASSIUM SERPL-SCNC: 3.6 MMOL/L (ref 3.5–5.2)
PROT SERPL-MCNC: 7.8 G/DL (ref 6–8.5)
PROT UR QL STRIP: NEGATIVE
RBC # BLD AUTO: 4.24 10*6/MM3 (ref 3.77–5.28)
RBC # UR: ABNORMAL /HPF
REF LAB TEST METHOD: ABNORMAL
SODIUM SERPL-SCNC: 138 MMOL/L (ref 136–145)
SP GR UR STRIP: 1.02 (ref 1–1.03)
SQUAMOUS #/AREA URNS HPF: ABNORMAL /HPF
UROBILINOGEN UR QL STRIP: ABNORMAL
WBC # BLD AUTO: 8.13 10*3/MM3 (ref 3.4–10.8)
WBC UR QL AUTO: ABNORMAL /HPF
WHOLE BLOOD HOLD SPECIMEN: NORMAL
WHOLE BLOOD HOLD SPECIMEN: NORMAL

## 2021-05-25 PROCEDURE — 80307 DRUG TEST PRSMV CHEM ANLYZR: CPT | Performed by: EMERGENCY MEDICINE

## 2021-05-25 PROCEDURE — 99283 EMERGENCY DEPT VISIT LOW MDM: CPT

## 2021-05-25 PROCEDURE — 86140 C-REACTIVE PROTEIN: CPT | Performed by: EMERGENCY MEDICINE

## 2021-05-25 PROCEDURE — 96372 THER/PROPH/DIAG INJ SC/IM: CPT

## 2021-05-25 PROCEDURE — 84703 CHORIONIC GONADOTROPIN ASSAY: CPT | Performed by: EMERGENCY MEDICINE

## 2021-05-25 PROCEDURE — 87040 BLOOD CULTURE FOR BACTERIA: CPT | Performed by: EMERGENCY MEDICINE

## 2021-05-25 PROCEDURE — 96367 TX/PROPH/DG ADDL SEQ IV INF: CPT

## 2021-05-25 PROCEDURE — 73630 X-RAY EXAM OF FOOT: CPT | Performed by: RADIOLOGY

## 2021-05-25 PROCEDURE — 96361 HYDRATE IV INFUSION ADD-ON: CPT

## 2021-05-25 PROCEDURE — 81001 URINALYSIS AUTO W/SCOPE: CPT | Performed by: EMERGENCY MEDICINE

## 2021-05-25 PROCEDURE — 85025 COMPLETE CBC W/AUTO DIFF WBC: CPT | Performed by: EMERGENCY MEDICINE

## 2021-05-25 PROCEDURE — 73590 X-RAY EXAM OF LOWER LEG: CPT | Performed by: RADIOLOGY

## 2021-05-25 PROCEDURE — 96366 THER/PROPH/DIAG IV INF ADDON: CPT

## 2021-05-25 PROCEDURE — 93971 EXTREMITY STUDY: CPT | Performed by: RADIOLOGY

## 2021-05-25 PROCEDURE — 25010000002 PIPERACILLIN SOD-TAZOBACTAM PER 1 G: Performed by: EMERGENCY MEDICINE

## 2021-05-25 PROCEDURE — 73630 X-RAY EXAM OF FOOT: CPT

## 2021-05-25 PROCEDURE — 73590 X-RAY EXAM OF LOWER LEG: CPT

## 2021-05-25 PROCEDURE — 25010000002 DIPHENHYDRAMINE PER 50 MG: Performed by: EMERGENCY MEDICINE

## 2021-05-25 PROCEDURE — 93971 EXTREMITY STUDY: CPT

## 2021-05-25 PROCEDURE — 25010000002 VANCOMYCIN 5 G RECONSTITUTED SOLUTION: Performed by: EMERGENCY MEDICINE

## 2021-05-25 PROCEDURE — 80053 COMPREHEN METABOLIC PANEL: CPT | Performed by: EMERGENCY MEDICINE

## 2021-05-25 PROCEDURE — 96365 THER/PROPH/DIAG IV INF INIT: CPT

## 2021-05-25 PROCEDURE — 83605 ASSAY OF LACTIC ACID: CPT | Performed by: EMERGENCY MEDICINE

## 2021-05-25 RX ORDER — DOXYCYCLINE 100 MG/1
100 CAPSULE ORAL 2 TIMES DAILY
Qty: 20 CAPSULE | Refills: 0 | Status: SHIPPED | OUTPATIENT
Start: 2021-05-25 | End: 2021-06-04

## 2021-05-25 RX ORDER — OXYCODONE HYDROCHLORIDE AND ACETAMINOPHEN 5; 325 MG/1; MG/1
1 TABLET ORAL EVERY 6 HOURS PRN
Qty: 10 TABLET | Refills: 0 | Status: SHIPPED | OUTPATIENT
Start: 2021-05-25 | End: 2021-05-26 | Stop reason: SINTOL

## 2021-05-25 RX ORDER — SODIUM CHLORIDE 9 MG/ML
125 INJECTION, SOLUTION INTRAVENOUS CONTINUOUS
Status: DISCONTINUED | OUTPATIENT
Start: 2021-05-25 | End: 2021-05-26 | Stop reason: HOSPADM

## 2021-05-25 RX ORDER — OXYCODONE HYDROCHLORIDE AND ACETAMINOPHEN 5; 325 MG/1; MG/1
1 TABLET ORAL EVERY 6 HOURS PRN
Status: DISCONTINUED | OUTPATIENT
Start: 2021-05-25 | End: 2021-05-25

## 2021-05-25 RX ORDER — DIPHENHYDRAMINE HYDROCHLORIDE 50 MG/ML
50 INJECTION INTRAMUSCULAR; INTRAVENOUS ONCE
Status: COMPLETED | OUTPATIENT
Start: 2021-05-25 | End: 2021-05-25

## 2021-05-25 RX ORDER — OXYCODONE HYDROCHLORIDE 5 MG/1
10 TABLET ORAL EVERY 4 HOURS PRN
Status: DISCONTINUED | OUTPATIENT
Start: 2021-05-25 | End: 2021-05-26 | Stop reason: HOSPADM

## 2021-05-25 RX ORDER — AMOXICILLIN AND CLAVULANATE POTASSIUM 875; 125 MG/1; MG/1
1 TABLET, FILM COATED ORAL EVERY 12 HOURS
Qty: 20 TABLET | Refills: 0 | Status: SHIPPED | OUTPATIENT
Start: 2021-05-25 | End: 2021-06-04

## 2021-05-25 RX ADMIN — SODIUM CHLORIDE 1000 ML: 9 INJECTION, SOLUTION INTRAVENOUS at 18:42

## 2021-05-25 RX ADMIN — PIPERACILLIN SODIUM AND TAZOBACTAM SODIUM 3.38 G: 3; .375 INJECTION, POWDER, LYOPHILIZED, FOR SOLUTION INTRAVENOUS at 20:13

## 2021-05-25 RX ADMIN — VANCOMYCIN HYDROCHLORIDE 2000 MG: 5 INJECTION, POWDER, LYOPHILIZED, FOR SOLUTION INTRAVENOUS at 20:45

## 2021-05-25 RX ADMIN — SODIUM CHLORIDE 125 ML/HR: 9 INJECTION, SOLUTION INTRAVENOUS at 19:31

## 2021-05-25 RX ADMIN — OXYCODONE HYDROCHLORIDE AND ACETAMINOPHEN 1 TABLET: 5; 325 TABLET ORAL at 22:38

## 2021-05-25 RX ADMIN — OXYCODONE HYDROCHLORIDE 10 MG: 5 TABLET ORAL at 18:43

## 2021-05-25 RX ADMIN — DIPHENHYDRAMINE HYDROCHLORIDE 50 MG: 50 INJECTION INTRAMUSCULAR; INTRAVENOUS at 23:47

## 2021-05-25 NOTE — ED NOTES
Patient states she was unable to provide a urine sample at this time. Fluids running and I will re-evaluate soon     Mel Rivera RN  05/25/21 8112

## 2021-05-26 NOTE — DISCHARGE INSTRUCTIONS
Rest and elevate the right foot.  Warm moist compresses intermittently throughout the day as we discussed.  Antibiotics as prescribed.over-the-counter Benadryl tablets 1-2 p.o. every 6 hours as needed rash/itching.  Do not take the Percocet that was previously prescribed.  See Dr. Mathew in 2 days.  Return to the emergency department right away if symptoms worsen/any problems.

## 2021-05-26 NOTE — ED PROVIDER NOTES
"Subjective   35-year-old female presents complaining of the onset earlier today of swelling, redness, pain in the dorsum of the right foot, \"cellulitis\".  She states that earlier today she had a temperature of 103 degrees.  She states that 2 to 3 weeks ago she had cellulitis on her left lower leg, went to her doctor who prescribed clindamycin and this resolved.  She states that she finished her course of clindamycin approximately 1 week ago.  She denies chest pain, shortness of breath, cough, abdominal pain, vomiting, other symptoms or other complaints.  She does have a history of substance abuse.  She states formerly she was an IV drug user but has not done this in approximately 3 months.  Patient has both Suboxone and methadone on her medication list, she states that she has not been on either of these medications in a long time, states that her medication list has not been updated.  She states that she took a morphine 10 mg tablet given to her by a friend earlier.          Review of Systems   Constitutional: Positive for fever.   HENT: Negative for ear pain, sore throat and trouble swallowing.    Eyes: Negative for photophobia and pain.   Respiratory: Negative for shortness of breath, wheezing and stridor.    Cardiovascular: Negative for chest pain and palpitations.   Gastrointestinal: Negative for abdominal distention, abdominal pain, blood in stool, diarrhea, nausea and vomiting.   Endocrine: Negative for polydipsia and polyphagia.   Genitourinary: Negative for difficulty urinating and flank pain.   Musculoskeletal: Negative for back pain, neck pain and neck stiffness.   Skin: Negative for color change and pallor.   Neurological: Negative for seizures, syncope and speech difficulty.   Psychiatric/Behavioral: Negative for confusion.   All other systems reviewed and are negative.      Past Medical History:   Diagnosis Date   • Anxiety    • Arthritis    • Bradycardia    • Fibromyalgia    • Heart disease    • " Hepatitis C    • Myalgia        Allergies   Allergen Reactions   • Adhesive Tape      Steri Strip Adhesive Allergy    • Bactrim [Sulfamethoxazole-Trimethoprim] Hives, Itching and Swelling   • Codeine Hives, Itching and Swelling   • Keflex [Cephalexin] Hives, Itching and Swelling       Past Surgical History:   Procedure Laterality Date   • CHOLECYSTECTOMY     • NERVE SURGERY Right        No family history on file.    Social History     Socioeconomic History   • Marital status: Single     Spouse name: Not on file   • Number of children: Not on file   • Years of education: Not on file   • Highest education level: Not on file   Tobacco Use   • Smoking status: Current Every Day Smoker     Packs/day: 0.50     Years: 15.00     Pack years: 7.50     Types: Cigarettes   • Smokeless tobacco: Never Used   Substance and Sexual Activity   • Alcohol use: No   • Drug use: No   • Sexual activity: Defer           Objective   Physical Exam  Vitals and nursing note reviewed.   Constitutional:       General: She is not in acute distress.     Appearance: Normal appearance. She is well-developed. She is not toxic-appearing or diaphoretic.      Comments: Well-developed well-nourished female, appears anxious.  Does not otherwise appear to be in acute distress.   HENT:      Head: Normocephalic and atraumatic.   Eyes:      General: No scleral icterus.     Pupils: Pupils are equal, round, and reactive to light.   Neck:      Trachea: No tracheal deviation.   Cardiovascular:      Rate and Rhythm: Normal rate and regular rhythm.   Pulmonary:      Effort: Pulmonary effort is normal. No respiratory distress.      Breath sounds: Normal breath sounds.   Chest:      Chest wall: No tenderness.   Abdominal:      General: Bowel sounds are normal.      Palpations: Abdomen is soft.      Tenderness: There is no abdominal tenderness. There is no guarding or rebound.   Musculoskeletal:         General: Normal range of motion.      Cervical back: Normal range  of motion and neck supple. No rigidity or tenderness.      Comments: The dorsum of the right foot is red, swollen, tender, appears cellulitic.  Plantar aspect not involved.  The calf is not red, swollen or tender.  There is no Homans' sign.  Distal neurovascular intact.  No other swelling or tenderness.   Skin:     General: Skin is warm and dry.      Capillary Refill: Capillary refill takes less than 2 seconds.      Coloration: Skin is not pale.   Neurological:      General: No focal deficit present.      Mental Status: She is alert and oriented to person, place, and time.      GCS: GCS eye subscore is 4. GCS verbal subscore is 5. GCS motor subscore is 6.      Motor: No abnormal muscle tone.   Psychiatric:         Behavior: Behavior normal.         Procedures  US Venous Doppler Lower Extremity Right (duplex)   Final Result   No DVT in the right lower extremity on today's exam.        This report was finalized on 5/25/2021 7:33 PM by Dr. Cameron Jacobs MD.          XR Foot 3+ View Right   Final Result   No acute or destructive bony abnormality        This report was finalized on 5/25/2021 6:42 PM by Dr. Cameron Jacobs MD.          XR Tibia Fibula 2 View Right   Final Result   No acute bony abnormality        This report was finalized on 5/25/2021 6:42 PM by Dr. Cameron Jacobs MD.            Results for orders placed or performed during the hospital encounter of 05/25/21   Comprehensive Metabolic Panel    Specimen: Blood   Result Value Ref Range    Glucose 106 (H) 65 - 99 mg/dL    BUN 7 6 - 20 mg/dL    Creatinine 0.67 0.57 - 1.00 mg/dL    Sodium 138 136 - 145 mmol/L    Potassium 3.6 3.5 - 5.2 mmol/L    Chloride 102 98 - 107 mmol/L    CO2 26.4 22.0 - 29.0 mmol/L    Calcium 9.1 8.6 - 10.5 mg/dL    Total Protein 7.8 6.0 - 8.5 g/dL    Albumin 4.12 3.50 - 5.20 g/dL    ALT (SGPT) 32 1 - 33 U/L    AST (SGOT) 39 (H) 1 - 32 U/L    Alkaline Phosphatase 72 39 - 117 U/L    Total Bilirubin 0.2 0.0 - 1.2 mg/dL    eGFR Non African Amer  101 >60 mL/min/1.73    Globulin 3.7 gm/dL    A/G Ratio 1.1 g/dL    BUN/Creatinine Ratio 10.4 7.0 - 25.0    Anion Gap 9.6 5.0 - 15.0 mmol/L   hCG, Serum, Qualitative    Specimen: Blood   Result Value Ref Range    HCG Qualitative Negative Negative   Urinalysis With Microscopic If Indicated (No Culture) - Urine, Clean Catch    Specimen: Urine, Clean Catch   Result Value Ref Range    Color, UA Yellow Yellow, Straw    Appearance, UA Clear Clear    pH, UA <=5.0 5.0 - 8.0    Specific Gravity, UA 1.016 1.005 - 1.030    Glucose, UA Negative Negative    Ketones, UA Negative Negative    Bilirubin, UA Negative Negative    Blood, UA Trace (A) Negative    Protein, UA Negative Negative    Leuk Esterase, UA Negative Negative    Nitrite, UA Negative Negative    Urobilinogen, UA 0.2 E.U./dL 0.2 - 1.0 E.U./dL   Lactic Acid, Plasma    Specimen: Blood   Result Value Ref Range    Lactate 1.5 0.5 - 2.0 mmol/L   C-reactive Protein    Specimen: Blood   Result Value Ref Range    C-Reactive Protein 1.28 (H) 0.00 - 0.50 mg/dL   Urine Drug Screen - Urine, Clean Catch    Specimen: Urine, Clean Catch   Result Value Ref Range    Amphetamine Screen, Urine Positive (A) Negative    Barbiturates Screen, Urine Negative Negative    Benzodiazepine Screen, Urine Negative Negative    Cocaine Screen, Urine Negative Negative    Methadone Screen, Urine Negative Negative    Opiate Screen Positive (A) Negative    Phencyclidine (PCP), Urine Negative Negative    THC, Screen, Urine Positive (A) Negative    6-ACETYL MORPHINE Negative Negative    Buprenorphine, Screen, Urine Negative Negative    Oxycodone Screen, Urine Positive (A) Negative   CBC Auto Differential    Specimen: Blood   Result Value Ref Range    WBC 8.13 3.40 - 10.80 10*3/mm3    RBC 4.24 3.77 - 5.28 10*6/mm3    Hemoglobin 11.8 (L) 12.0 - 15.9 g/dL    Hematocrit 36.4 34.0 - 46.6 %    MCV 85.8 79.0 - 97.0 fL    MCH 27.8 26.6 - 33.0 pg    MCHC 32.4 31.5 - 35.7 g/dL    RDW 12.3 12.3 - 15.4 %    RDW-SD  38.6 37.0 - 54.0 fl    MPV 8.2 6.0 - 12.0 fL    Platelets 249 140 - 450 10*3/mm3    Neutrophil % 49.9 42.7 - 76.0 %    Lymphocyte % 40.7 19.6 - 45.3 %    Monocyte % 7.1 5.0 - 12.0 %    Eosinophil % 1.6 0.3 - 6.2 %    Basophil % 0.5 0.0 - 1.5 %    Immature Grans % 0.2 0.0 - 0.5 %    Neutrophils, Absolute 4.05 1.70 - 7.00 10*3/mm3    Lymphocytes, Absolute 3.31 (H) 0.70 - 3.10 10*3/mm3    Monocytes, Absolute 0.58 0.10 - 0.90 10*3/mm3    Eosinophils, Absolute 0.13 0.00 - 0.40 10*3/mm3    Basophils, Absolute 0.04 0.00 - 0.20 10*3/mm3    Immature Grans, Absolute 0.02 0.00 - 0.05 10*3/mm3    nRBC 0.0 0.0 - 0.2 /100 WBC   Urinalysis, Microscopic Only - Urine, Clean Catch    Specimen: Urine, Clean Catch   Result Value Ref Range    RBC, UA 0-2 None Seen, 0-2 /HPF    WBC, UA 0-2 None Seen, 0-2 /HPF    Bacteria, UA 1+ (A) None Seen /HPF    Squamous Epithelial Cells, UA 7-12 (A) None Seen, 0-2 /HPF    Hyaline Casts, UA None Seen None Seen /LPF    Methodology Automated Microscopy    Light Blue Top   Result Value Ref Range    Extra Tube hold for add-on    Green Top (Gel)   Result Value Ref Range    Extra Tube Hold for add-ons.    Lavender Top   Result Value Ref Range    Extra Tube hold for add-on                 ED Course  ED Course as of May 26 0027   Tue May 25, 2021   2209 Patient's emergency department stay has not been complicated.  She has been resting comfortably.  We discussed her test results.  She does not wish to be admitted to the hospital, she much prefers outpatient management.  She does agree to return to the emergency department right away if her symptoms worsen or if she has any problems.  We discussed her plan of care.  She voices understanding and agreement.    [CM]   2344 Prior to discharge, the nurse administered a Percocet 5/325 to the patient.  Patient states that almost immediately she started itching in her scalp, behind her ears and in her upper back, states she is breaking out in hives.  States that she  tolerated the oxycodone IR that she was given earlier without difficulty, but that the Percocet has caused her to have allergic reaction.  No urticaria is seen.  There is nonspecific redness behind her ears, in her neck and in her upper back where she has been scratching.  Lungs are clear to auscultation throughout.  I have ordered 50 mg intramuscular Benadryl.  Her IV has already been removed.    [CM]   Wed May 26, 2021   0024 Feeling much better now, resting comfortably.  Discharged home in care of her mother.    [CM]      ED Course User Index  [CM] Jareth Mendez MD                                           [facility]    Final diagnoses:   Cellulitis of right foot       ED Disposition  ED Disposition     ED Disposition Condition Comment    Discharge Stable             Please note that portions of this note were completed with a voice recognition program.       Jason Mathew MD  [address]  [address]  [facility] [address] [phone]    Go in 2 days      [facility]  [address] [place] [address] [phone]  Go to   If symptoms worsen         Medication List      New Prescriptions    amoxicillin-clavulanate 875-125 MG per tablet  Commonly known as: AUGMENTIN  Take 1 tablet by mouth Every 12 (Twelve) Hours for 10 days.     doxycycline 100 MG capsule  Commonly known as: MONODOX  Take 1 capsule by mouth 2 (Two) Times a Day for 10 days.        Stop    buprenorphine-naloxone 8-2 MG per SL tablet  Commonly known as: SUBOXONE     ibuprofen 800 MG tablet  Commonly known as: ADVIL,MOTRIN     METHADONE HCL PO     promethazine-dextromethorphan 6.25-15 MG/5ML syrup  Commonly known as: PROMETHAZINE-DM           Where to Get Your Medications      These medications were sent to [facility] - [place], [place] - [address] - [phone]  - [phone] FX  [address] [place] [id number]    Phone: [phone]   · amoxicillin-clavulanate 875-125 MG per  tablet  · doxycycline 100 MG capsule          Jareth Mendez MD  05/26/21 0033

## 2021-05-30 LAB
BACTERIA SPEC AEROBE CULT: NORMAL
BACTERIA SPEC AEROBE CULT: NORMAL

## 2021-06-18 ENCOUNTER — IMMUNIZATION (OUTPATIENT)
Dept: VACCINE CLINIC | Facility: HOSPITAL | Age: 36
End: 2021-06-18

## 2021-06-18 PROCEDURE — 0001A: CPT | Performed by: INTERNAL MEDICINE

## 2021-06-18 PROCEDURE — 91300 HC SARSCOV02 VAC 30MCG/0.3ML IM: CPT | Performed by: INTERNAL MEDICINE

## 2021-07-12 ENCOUNTER — IMMUNIZATION (OUTPATIENT)
Dept: VACCINE CLINIC | Facility: HOSPITAL | Age: 36
End: 2021-07-12

## 2021-07-12 PROCEDURE — 91300 HC SARSCOV02 VAC 30MCG/0.3ML IM: CPT | Performed by: INTERNAL MEDICINE

## 2021-07-12 PROCEDURE — 0002A: CPT | Performed by: INTERNAL MEDICINE

## 2021-09-08 ENCOUNTER — HOSPITAL ENCOUNTER (OUTPATIENT)
Dept: GENERAL RADIOLOGY | Facility: HOSPITAL | Age: 36
Discharge: HOME | End: 2021-09-08
Admitting: NURSE PRACTITIONER

## 2021-09-08 ENCOUNTER — TRANSCRIBE ORDERS (OUTPATIENT)
Dept: ADMINISTRATIVE | Facility: HOSPITAL | Age: 36
End: 2021-09-08

## 2021-09-08 DIAGNOSIS — A15.0 TUBERCULOSIS OF LUNG, INFILTRATIVE, BACTERIOLOGICAL OR HISTOLOGICAL EXAMINATION NOT DONE: Primary | ICD-10-CM

## 2021-09-08 DIAGNOSIS — A15.0 TUBERCULOSIS OF LUNG, INFILTRATIVE, BACTERIOLOGICAL OR HISTOLOGICAL EXAMINATION NOT DONE: ICD-10-CM

## 2021-09-08 PROCEDURE — 71046 X-RAY EXAM CHEST 2 VIEWS: CPT | Performed by: RADIOLOGY

## 2021-09-08 PROCEDURE — 71046 X-RAY EXAM CHEST 2 VIEWS: CPT

## 2022-01-19 ENCOUNTER — APPOINTMENT (OUTPATIENT)
Dept: GENERAL RADIOLOGY | Facility: HOSPITAL | Age: 37
End: 2022-01-19

## 2022-01-19 ENCOUNTER — HOSPITAL ENCOUNTER (EMERGENCY)
Facility: HOSPITAL | Age: 37
Discharge: HOME | End: 2022-01-20
Attending: EMERGENCY MEDICINE | Admitting: EMERGENCY MEDICINE

## 2022-01-19 DIAGNOSIS — R06.02 SHORTNESS OF BREATH: Primary | ICD-10-CM

## 2022-01-19 LAB
ALBUMIN SERPL-MCNC: 4.34 G/DL (ref 3.5–5.2)
ALBUMIN/GLOB SERPL: 1.2 G/DL
ALP SERPL-CCNC: 77 U/L (ref 39–117)
ALT SERPL W P-5'-P-CCNC: 24 U/L (ref 1–33)
ANION GAP SERPL CALCULATED.3IONS-SCNC: 11.4 MMOL/L (ref 5–15)
AST SERPL-CCNC: 23 U/L (ref 1–32)
B-HCG UR QL: NEGATIVE
BASOPHILS # BLD AUTO: 0.06 10*3/MM3 (ref 0–0.2)
BASOPHILS NFR BLD AUTO: 0.9 % (ref 0–1.5)
BILIRUB SERPL-MCNC: <0.2 MG/DL (ref 0–1.2)
BILIRUB UR QL STRIP: NEGATIVE
BUN SERPL-MCNC: 15 MG/DL (ref 6–20)
BUN/CREAT SERPL: 21.4 (ref 7–25)
CALCIUM SPEC-SCNC: 9.3 MG/DL (ref 8.6–10.5)
CHLORIDE SERPL-SCNC: 100 MMOL/L (ref 98–107)
CLARITY UR: CLEAR
CO2 SERPL-SCNC: 23.6 MMOL/L (ref 22–29)
COLOR UR: ABNORMAL
CREAT SERPL-MCNC: 0.7 MG/DL (ref 0.57–1)
CRP SERPL-MCNC: <0.3 MG/DL (ref 0–0.5)
D DIMER PPP FEU-MCNC: 1.34 MCGFEU/ML (ref 0–0.5)
D-LACTATE SERPL-SCNC: 1.4 MMOL/L (ref 0.5–2)
DEPRECATED RDW RBC AUTO: 39.2 FL (ref 37–54)
EOSINOPHIL # BLD AUTO: 0.34 10*3/MM3 (ref 0–0.4)
EOSINOPHIL NFR BLD AUTO: 5.2 % (ref 0.3–6.2)
ERYTHROCYTE [DISTWIDTH] IN BLOOD BY AUTOMATED COUNT: 12.4 % (ref 12.3–15.4)
ERYTHROCYTE [SEDIMENTATION RATE] IN BLOOD: 30 MM/HR (ref 0–20)
FLUAV SUBTYP SPEC NAA+PROBE: NOT DETECTED
FLUBV RNA ISLT QL NAA+PROBE: NOT DETECTED
GFR SERPL CREATININE-BSD FRML MDRD: 96 ML/MIN/1.73
GLOBULIN UR ELPH-MCNC: 3.7 GM/DL
GLUCOSE SERPL-MCNC: 134 MG/DL (ref 65–99)
GLUCOSE UR STRIP-MCNC: NEGATIVE MG/DL
HCT VFR BLD AUTO: 38.5 % (ref 34–46.6)
HGB BLD-MCNC: 12.6 G/DL (ref 12–15.9)
HGB UR QL STRIP.AUTO: NEGATIVE
HOLD SPECIMEN: NORMAL
HOLD SPECIMEN: NORMAL
IMM GRANULOCYTES # BLD AUTO: 0.01 10*3/MM3 (ref 0–0.05)
IMM GRANULOCYTES NFR BLD AUTO: 0.2 % (ref 0–0.5)
KETONES UR QL STRIP: ABNORMAL
LEUKOCYTE ESTERASE UR QL STRIP.AUTO: NEGATIVE
LYMPHOCYTES # BLD AUTO: 3.19 10*3/MM3 (ref 0.7–3.1)
LYMPHOCYTES NFR BLD AUTO: 48.3 % (ref 19.6–45.3)
MCH RBC QN AUTO: 28.6 PG (ref 26.6–33)
MCHC RBC AUTO-ENTMCNC: 32.7 G/DL (ref 31.5–35.7)
MCV RBC AUTO: 87.3 FL (ref 79–97)
MONOCYTES # BLD AUTO: 0.44 10*3/MM3 (ref 0.1–0.9)
MONOCYTES NFR BLD AUTO: 6.7 % (ref 5–12)
NEUTROPHILS NFR BLD AUTO: 2.56 10*3/MM3 (ref 1.7–7)
NEUTROPHILS NFR BLD AUTO: 38.7 % (ref 42.7–76)
NITRITE UR QL STRIP: NEGATIVE
NRBC BLD AUTO-RTO: 0 /100 WBC (ref 0–0.2)
PH UR STRIP.AUTO: 6.5 [PH] (ref 5–8)
PLATELET # BLD AUTO: 308 10*3/MM3 (ref 140–450)
PMV BLD AUTO: 8 FL (ref 6–12)
POTASSIUM SERPL-SCNC: 4.2 MMOL/L (ref 3.5–5.2)
PROT SERPL-MCNC: 8 G/DL (ref 6–8.5)
PROT UR QL STRIP: NEGATIVE
RBC # BLD AUTO: 4.41 10*6/MM3 (ref 3.77–5.28)
SARS-COV-2 RNA PNL SPEC NAA+PROBE: NOT DETECTED
SODIUM SERPL-SCNC: 135 MMOL/L (ref 136–145)
SP GR UR STRIP: >=1.03 (ref 1–1.03)
TROPONIN T SERPL-MCNC: <0.01 NG/ML (ref 0–0.03)
TROPONIN T SERPL-MCNC: <0.01 NG/ML (ref 0–0.03)
UROBILINOGEN UR QL STRIP: ABNORMAL
WBC NRBC COR # BLD: 6.6 10*3/MM3 (ref 3.4–10.8)
WHOLE BLOOD HOLD SPECIMEN: NORMAL
WHOLE BLOOD HOLD SPECIMEN: NORMAL

## 2022-01-19 PROCEDURE — 36415 COLL VENOUS BLD VENIPUNCTURE: CPT

## 2022-01-19 PROCEDURE — 85025 COMPLETE CBC W/AUTO DIFF WBC: CPT | Performed by: NURSE PRACTITIONER

## 2022-01-19 PROCEDURE — 96372 THER/PROPH/DIAG INJ SC/IM: CPT

## 2022-01-19 PROCEDURE — 86140 C-REACTIVE PROTEIN: CPT | Performed by: NURSE PRACTITIONER

## 2022-01-19 PROCEDURE — 87636 SARSCOV2 & INF A&B AMP PRB: CPT | Performed by: NURSE PRACTITIONER

## 2022-01-19 PROCEDURE — 80053 COMPREHEN METABOLIC PANEL: CPT | Performed by: NURSE PRACTITIONER

## 2022-01-19 PROCEDURE — 99283 EMERGENCY DEPT VISIT LOW MDM: CPT

## 2022-01-19 PROCEDURE — 81025 URINE PREGNANCY TEST: CPT | Performed by: NURSE PRACTITIONER

## 2022-01-19 PROCEDURE — 85379 FIBRIN DEGRADATION QUANT: CPT | Performed by: NURSE PRACTITIONER

## 2022-01-19 PROCEDURE — 71045 X-RAY EXAM CHEST 1 VIEW: CPT

## 2022-01-19 PROCEDURE — 84484 ASSAY OF TROPONIN QUANT: CPT | Performed by: NURSE PRACTITIONER

## 2022-01-19 PROCEDURE — 83605 ASSAY OF LACTIC ACID: CPT | Performed by: NURSE PRACTITIONER

## 2022-01-19 PROCEDURE — 85652 RBC SED RATE AUTOMATED: CPT | Performed by: NURSE PRACTITIONER

## 2022-01-19 PROCEDURE — 87040 BLOOD CULTURE FOR BACTERIA: CPT | Performed by: NURSE PRACTITIONER

## 2022-01-19 PROCEDURE — 93005 ELECTROCARDIOGRAM TRACING: CPT | Performed by: EMERGENCY MEDICINE

## 2022-01-19 PROCEDURE — 93005 ELECTROCARDIOGRAM TRACING: CPT | Performed by: STUDENT IN AN ORGANIZED HEALTH CARE EDUCATION/TRAINING PROGRAM

## 2022-01-19 PROCEDURE — 81003 URINALYSIS AUTO W/O SCOPE: CPT | Performed by: NURSE PRACTITIONER

## 2022-01-19 NOTE — ED NOTES
MEDICAL SCREENING:    Reason for Visit: SOA, CP, Dizziness     Patient initially seen in triage.  The patient was advised further evaluation and diagnostic testing will be needed, some of the treatment and testing will be initiated in the lobby in order to begin the process.  The patient will be returned to the waiting area for the time being and possibly be re-assessed by a subsequent ED provider.  The patient will be brought back to the treatment area in as timely manner as possible.       Maryanne Girard, APRN  01/19/22 2177

## 2022-01-20 ENCOUNTER — APPOINTMENT (OUTPATIENT)
Dept: CT IMAGING | Facility: HOSPITAL | Age: 37
End: 2022-01-20

## 2022-01-20 VITALS
HEIGHT: 66 IN | BODY MASS INDEX: 35.36 KG/M2 | DIASTOLIC BLOOD PRESSURE: 75 MMHG | OXYGEN SATURATION: 100 % | SYSTOLIC BLOOD PRESSURE: 120 MMHG | TEMPERATURE: 97.7 F | RESPIRATION RATE: 18 BRPM | WEIGHT: 220 LBS | HEART RATE: 80 BPM

## 2022-01-20 LAB
QT INTERVAL: 342 MS
QTC INTERVAL: 434 MS

## 2022-01-20 PROCEDURE — 25010000002 METHYLPREDNISOLONE PER 80 MG: Performed by: EMERGENCY MEDICINE

## 2022-01-20 PROCEDURE — 71275 CT ANGIOGRAPHY CHEST: CPT

## 2022-01-20 PROCEDURE — 0 IOPAMIDOL PER 1 ML: Performed by: EMERGENCY MEDICINE

## 2022-01-20 RX ORDER — METHYLPREDNISOLONE ACETATE 80 MG/ML
80 INJECTION, SUSPENSION INTRA-ARTICULAR; INTRALESIONAL; INTRAMUSCULAR; SOFT TISSUE ONCE
Status: COMPLETED | OUTPATIENT
Start: 2022-01-20 | End: 2022-01-20

## 2022-01-20 RX ORDER — DOXYCYCLINE 100 MG/1
100 CAPSULE ORAL ONCE
Status: COMPLETED | OUTPATIENT
Start: 2022-01-20 | End: 2022-01-20

## 2022-01-20 RX ORDER — SODIUM CHLORIDE 0.9 % (FLUSH) 0.9 %
10 SYRINGE (ML) INJECTION AS NEEDED
Status: DISCONTINUED | OUTPATIENT
Start: 2022-01-20 | End: 2022-01-20 | Stop reason: HOSPADM

## 2022-01-20 RX ORDER — ALBUTEROL SULFATE 90 UG/1
2 AEROSOL, METERED RESPIRATORY (INHALATION) EVERY 4 HOURS PRN
Status: DISCONTINUED | OUTPATIENT
Start: 2022-01-20 | End: 2022-01-20 | Stop reason: HOSPADM

## 2022-01-20 RX ORDER — DOXYCYCLINE 100 MG/1
100 CAPSULE ORAL EVERY 12 HOURS
Qty: 20 CAPSULE | Refills: 0 | Status: ON HOLD | OUTPATIENT
Start: 2022-01-20 | End: 2022-09-09

## 2022-01-20 RX ADMIN — METHYLPREDNISOLONE ACETATE 80 MG: 80 INJECTION, SUSPENSION INTRA-ARTICULAR; INTRALESIONAL; INTRAMUSCULAR; SOFT TISSUE at 02:35

## 2022-01-20 RX ADMIN — DOXYCYCLINE 100 MG: 100 CAPSULE ORAL at 02:35

## 2022-01-20 RX ADMIN — IOPAMIDOL 80 ML: 755 INJECTION, SOLUTION INTRAVENOUS at 01:17

## 2022-01-20 RX ADMIN — ALBUTEROL SULFATE 2 PUFF: 90 AEROSOL, METERED RESPIRATORY (INHALATION) at 02:36

## 2022-01-20 NOTE — ED NOTES
Pt reassessed at this time, pt has no new complaints. Apologized to pt for wait times. Pt has NADN at this time. Will continue to monitor pt.        Tabatha Arthur RN  01/19/22 1464

## 2022-01-20 NOTE — ED PROVIDER NOTES
"Subjective   36-year-old female presents with a chief complaint of shortness of breath for the past 2 days.  She states she has been sick with respiratory symptoms for about 3-4 weeks.  She had been coughing up some \"thick slimy sputum\" but that has resolved.  She reports that her cough is now nonproductive.  She reports an occasional burning pain in her right mid posterior lateral chest.  She denies fever, chills, diaphoresis, hemoptysis, abdominal pain, nausea, vomiting, syncope or near syncope, other symptoms or other complaints.          Review of Systems   Constitutional: Negative for chills, diaphoresis and fever.   HENT: Negative for ear pain, sore throat and trouble swallowing.    Eyes: Negative for photophobia and pain.   Respiratory: Positive for cough and shortness of breath. Negative for stridor.    Cardiovascular: Positive for chest pain. Negative for palpitations.   Gastrointestinal: Negative for abdominal distention, abdominal pain, blood in stool, diarrhea, nausea and vomiting.   Endocrine: Negative for polydipsia and polyphagia.   Genitourinary: Negative for difficulty urinating and flank pain.   Musculoskeletal: Negative for back pain, neck pain and neck stiffness.   Skin: Negative for color change and pallor.   Neurological: Negative for seizures, syncope and speech difficulty.   Psychiatric/Behavioral: Negative for confusion.   All other systems reviewed and are negative.      Past Medical History:   Diagnosis Date   • Anxiety    • Arthritis    • Bradycardia    • Fibromyalgia    • Heart disease    • Hepatitis C    • Myalgia        Allergies   Allergen Reactions   • Adhesive Tape      Steri Strip Adhesive Allergy    • Bactrim [Sulfamethoxazole-Trimethoprim] Hives, Itching and Swelling   • Codeine Hives, Itching and Swelling   • Keflex [Cephalexin] Hives, Itching and Swelling       Past Surgical History:   Procedure Laterality Date   • CHOLECYSTECTOMY     • NERVE SURGERY Right        No family " history on file.    Social History     Socioeconomic History   • Marital status: Single   Tobacco Use   • Smoking status: Current Every Day Smoker     Packs/day: 0.50     Years: 15.00     Pack years: 7.50     Types: Cigarettes   • Smokeless tobacco: Never Used   Substance and Sexual Activity   • Alcohol use: No   • Drug use: No   • Sexual activity: Defer           Objective   Physical Exam  Vitals and nursing note reviewed.   Constitutional:       General: She is not in acute distress.     Appearance: Normal appearance. She is well-developed. She is not ill-appearing, toxic-appearing or diaphoretic.   HENT:      Head: Normocephalic and atraumatic.   Eyes:      General: No scleral icterus.     Pupils: Pupils are equal, round, and reactive to light.   Neck:      Trachea: No tracheal deviation.   Cardiovascular:      Rate and Rhythm: Normal rate and regular rhythm.   Pulmonary:      Effort: Pulmonary effort is normal. No respiratory distress.      Breath sounds: Normal breath sounds.   Chest:      Chest wall: No tenderness.   Abdominal:      General: Bowel sounds are normal.      Palpations: Abdomen is soft.      Tenderness: There is no abdominal tenderness. There is no guarding or rebound.   Musculoskeletal:         General: No tenderness. Normal range of motion.      Cervical back: Normal range of motion and neck supple. No rigidity or tenderness.      Right lower leg: No tenderness. No edema.      Left lower leg: No tenderness. No edema.   Skin:     General: Skin is warm and dry.      Capillary Refill: Capillary refill takes less than 2 seconds.      Coloration: Skin is not pale.   Neurological:      General: No focal deficit present.      Mental Status: She is alert and oriented to person, place, and time.      GCS: GCS eye subscore is 4. GCS verbal subscore is 5. GCS motor subscore is 6.      Motor: No abnormal muscle tone.   Psychiatric:         Mood and Affect: Mood normal.         Behavior: Behavior normal.          Procedures   EKG at 2245 shows sinus rhythm, rate 97.  PA interval 124, QRS duration 74, QTc 434 ms.  Some baseline artifact.  No apparent acute ischemia.  No evidence for STEMI.  CT Chest Pulmonary Embolism   Final Result   1. Negative for pulmonary embolus.   2. Negative for thoracic aortic aneurysm/dissection.   3. No acute pulmonary process.      Signer Name: Aristides Rosa MD    Signed: 1/20/2022 1:32 AM    Workstation Name: [email or web address] [place]     [facility]      XR Chest 1 View   Final Result   No acute cardiopulmonary findings.      Signer Name: DAVID HARKINS MD    Signed: 1/19/2022 7:08 PM    Workstation [facility]: [facility]     [facility]          Results for orders placed or performed during the hospital encounter of 01/19/22   COVID-19 and FLU A/B PCR - Swab, Nasopharynx    Specimen: Nasopharynx; Swab   Result Value Ref Range    COVID19 Not Detected Not Detected - Ref. Range    Influenza A PCR Not Detected Not Detected    Influenza B PCR Not Detected Not Detected   Comprehensive Metabolic Panel    Specimen: Arm, Right; Blood   Result Value Ref Range    Glucose 134 (H) 65 - 99 mg/dL    BUN 15 6 - 20 mg/dL    Creatinine 0.70 0.57 - 1.00 mg/dL    Sodium 135 (L) 136 - 145 mmol/L    Potassium 4.2 3.5 - 5.2 mmol/L    Chloride 100 98 - 107 mmol/L    CO2 23.6 22.0 - 29.0 mmol/L    Calcium 9.3 8.6 - 10.5 mg/dL    Total Protein 8.0 6.0 - 8.5 g/dL    Albumin 4.34 3.50 - 5.20 g/dL    ALT (SGPT) 24 1 - 33 U/L    AST (SGOT) 23 1 - 32 U/L    Alkaline Phosphatase 77 39 - 117 U/L    Total Bilirubin <0.2 0.0 - 1.2 mg/dL    eGFR Non African Amer 96 >60 mL/min/1.73    Globulin 3.7 gm/dL    A/G Ratio 1.2 g/dL    BUN/Creatinine Ratio 21.4 7.0 - 25.0    Anion Gap 11.4 5.0 - 15.0 mmol/L   Pregnancy, Urine - Urine, Clean Catch    Specimen: Urine, Clean Catch   Result Value Ref Range    HCG, Urine QL Negative Negative   Urinalysis With Microscopic If Indicated (No Culture) - Urine,  Clean Catch    Specimen: Urine, Clean Catch   Result Value Ref Range    Color, UA Dark Yellow (A) Yellow, Straw    Appearance, UA Clear Clear    pH, UA 6.5 5.0 - 8.0    Specific Gravity, UA >=1.030 1.005 - 1.030    Glucose, UA Negative Negative    Ketones, UA Trace (A) Negative    Bilirubin, UA Negative Negative    Blood, UA Negative Negative    Protein, UA Negative Negative    Leuk Esterase, UA Negative Negative    Nitrite, UA Negative Negative    Urobilinogen, UA 1.0 E.U./dL 0.2 - 1.0 E.U./dL   Troponin    Specimen: Arm, Right; Blood   Result Value Ref Range    Troponin T <0.010 0.000 - 0.030 ng/mL   Troponin    Specimen: Arm, Left; Blood   Result Value Ref Range    Troponin T <0.010 0.000 - 0.030 ng/mL   D-dimer, Quantitative    Specimen: Arm, Right; Blood   Result Value Ref Range    D-Dimer, Quantitative 1.34 (C) 0.00 - 0.50 MCGFEU/mL   Lactic Acid, Plasma    Specimen: Arm, Right; Blood   Result Value Ref Range    Lactate 1.4 0.5 - 2.0 mmol/L   C-reactive Protein    Specimen: Arm, Right; Blood   Result Value Ref Range    C-Reactive Protein <0.30 0.00 - 0.50 mg/dL   Sedimentation Rate    Specimen: Arm, Right; Blood   Result Value Ref Range    Sed Rate 30 (H) 0 - 20 mm/hr   CBC Auto Differential    Specimen: Arm, Right; Blood   Result Value Ref Range    WBC 6.60 3.40 - 10.80 10*3/mm3    RBC 4.41 3.77 - 5.28 10*6/mm3    Hemoglobin 12.6 12.0 - 15.9 g/dL    Hematocrit 38.5 34.0 - 46.6 %    MCV 87.3 79.0 - 97.0 fL    MCH 28.6 26.6 - 33.0 pg    MCHC 32.7 31.5 - 35.7 g/dL    RDW 12.4 12.3 - 15.4 %    RDW-SD 39.2 37.0 - 54.0 fl    MPV 8.0 6.0 - 12.0 fL    Platelets 308 140 - 450 10*3/mm3    Neutrophil % 38.7 (L) 42.7 - 76.0 %    Lymphocyte % 48.3 (H) 19.6 - 45.3 %    Monocyte % 6.7 5.0 - 12.0 %    Eosinophil % 5.2 0.3 - 6.2 %    Basophil % 0.9 0.0 - 1.5 %    Immature Grans % 0.2 0.0 - 0.5 %    Neutrophils, Absolute 2.56 1.70 - 7.00 10*3/mm3    Lymphocytes, Absolute 3.19 (H) 0.70 - 3.10 10*3/mm3    Monocytes, Absolute  0.44 0.10 - 0.90 10*3/mm3    Eosinophils, Absolute 0.34 0.00 - 0.40 10*3/mm3    Basophils, Absolute 0.06 0.00 - 0.20 10*3/mm3    Immature Grans, Absolute 0.01 0.00 - 0.05 10*3/mm3    nRBC 0.0 0.0 - 0.2 /100 WBC   Green Top (Gel)   Result Value Ref Range    Extra Tube Hold for add-ons.    Lavender Top   Result Value Ref Range    Extra Tube hold for add-on    Gold Top - SST   Result Value Ref Range    Extra Tube Hold for add-ons.    Light Blue Top   Result Value Ref Range    Extra Tube hold for add-on               ED Course  ED Course as of 01/20/22 0218   Wed Jan 19, 2022 2011 IMPRESSION:  No acute cardiopulmonary findings.     Signer Name: DAVID HARKINS MD   Signed: 1/19/2022 7:08 PM   Workstation Name: [email or web address] [place]    [facility] []   2249 EKG at 2245 hrs. NSR 97 bpm, , QRS 74, QTc 434, regular axis, no significant ST deviation or T wave abnormalities concerning for acute ischemia. [KP]   u Jan 20, 2022   0024 Patient now placed in room 104. [CM]   0217 Patient's emergency department stay has been uneventful.  Never has she shown any signs of distress.  We discussed her test results and her plan of care.  I stressed to her the importance of her stopping smoking.  She voices understanding and agreement.   [CM]      ED Course User Index  [AH] Kenia Denton PA  [CM] Jareth Mendez MD  [] José Hurtado MD                                                 [facility]    Final diagnoses:   Shortness of breath       ED Disposition  ED Disposition     ED Disposition Condition Comment    Discharge Stable           Jason Mathew MD  [address]  [address]  [facility] [address]  [phone]    Go to   2 to 3 days    [facility]  [address] [facility] [address] [phone]  Go to   If symptoms worsen         Medication List      New Prescriptions    doxycycline 100 MG capsule  Commonly known as: MONODOX  Take 1 capsule by mouth Every 12  (Twelve) Hours.           Where to Get Your Medications      You can get these medications from any pharmacy    Bring a paper prescription for each of these medications  · doxycycline 100 MG capsule         Please note that portions of this note were completed with a voice recognition program.        Jareth Mendez MD  01/20/22 0216

## 2022-01-20 NOTE — DISCHARGE INSTRUCTIONS
Home to rest.  Drink plenty of fluids.  Take your medication as prescribed.  Robitussin-DM as directed as needed for cough.  Please do your best to stop smoking.  See your family doctor in the office in 2 to 3 days.  Return to the emergency department right away if symptoms worsen/any problems.   Postoperative Note  PATIENT NAME: Susan Alexander  : 1967  MRN: [id number]  [facility] ROOM 01    Surgery Date: 3/22/2018    POST-OP DIAGNOSIS: See the impression below    SEDATION: Monitored anesthesia care, check anesthesia records    PHYSICAL EXAM:  Vitals:    18 0907   BP: 134/60   Pulse: 76   Resp: 14   Temp: 98 3 °F (36 8 °C)   SpO2: 97%     Body mass index is 38 06 kg/m²  General: NAD  Heart: S1 & S2 normal, RRR  Lungs: CTA, No rales or rhonchi  Abdomen: Soft, nontender, nondistended, good bowel sounds    CONSENT:  Informed consent was obtained for the procedure, including sedation after explaining the risks and benefits of the procedure  Risks including but not limited to bleeding, perforation, infection, aspiration were discussed in detail  Also explained about less than 100%$ sensitivity with the exam and other alternatives  DESCRIPTION:   Procedure, esophagogastroduodenoscopies with biopsy  Indications:  82-year-old female with melanotic stool and acute blood loss anemia following steroid use for Bell's palsy  ASA 2  Patient is premedicated with mac    Anesthesia patient is identified by me  She is examined prior to the procedure and found to be in stable condition  She is attached to the cardiac monitor and pulse oximeter and placed in left lateral position  After adequate intravenous sedation the Olympus video gastroscope was inserted under direct vision into the esophagus  The esophageal mucosa is completely normal throughout its length with a normal Z-line at 38 cm from the incisors  The stomach is entered  In the postoperative anastomosis in the gastric body from her Randy-en-Y there is a visible clip  Adjacent to this is a shallow anastomotic ulceration  It measures approximately 7 days 8 mm  Biopsies were taken with excellent hemostasis  Both afferent and efferent loops were cannulated and are completely unremarkable  The loop was cannulated up to 90 cm    Retroversion at the jejunal crest reveals normal sub anastomotic architecture  The crest itself is normal   Retroversion in the gastric remnant reveals a normal GE junction and fundus  She tolerated the procedure well and was stable throughout  My impression:  Anastomotic ulceration status post biopsy    RECOMMENDATIONS:  Continue current medical management with proton pump inhibitors and Carafate  Follow up biopsy results in 1 week    COMPLICATIONS:  None; patient tolerated the procedure well  DISPOSITION: PACU  CONDITION: Stable    Kirsty Mccracken MD  3/22/2018,9:55 AM        Portions of the record may have been created with voice recognition software   Occasional wrong word or "sound a like" substitutions may have occurred due to the inherent limitations of voice recognition software   Read the chart carefully and recognize, using context, where substitutions have occurred

## 2022-01-20 NOTE — ED NOTES
Pt reassessed at this time, pt has no new complaints. Apologized to pt for wait times. Pt has NADN at this time. Will continue to monitor pt.        Tabatha Arthur RN  01/19/22 [id number]

## 2022-01-20 NOTE — ED NOTES
Pt reassessed at this time, pt has no new complaints. Apologized to pt for wait times. Pt has NADN at this time. Will continue to monitor pt.        Tabatha Arthur RN  01/19/22 [id number]

## 2022-01-24 LAB
BACTERIA SPEC AEROBE CULT: NORMAL
BACTERIA SPEC AEROBE CULT: NORMAL

## 2022-04-28 ENCOUNTER — HOSPITAL ENCOUNTER (EMERGENCY)
Facility: HOSPITAL | Age: 37
Discharge: HOME | End: 2022-04-28
Attending: EMERGENCY MEDICINE | Admitting: EMERGENCY MEDICINE

## 2022-04-28 ENCOUNTER — APPOINTMENT (OUTPATIENT)
Dept: GENERAL RADIOLOGY | Facility: HOSPITAL | Age: 37
End: 2022-04-28

## 2022-04-28 VITALS
RESPIRATION RATE: 18 BRPM | WEIGHT: 200 LBS | OXYGEN SATURATION: 100 % | DIASTOLIC BLOOD PRESSURE: 80 MMHG | TEMPERATURE: 97.8 F | BODY MASS INDEX: 31.39 KG/M2 | SYSTOLIC BLOOD PRESSURE: 138 MMHG | HEART RATE: 93 BPM | HEIGHT: 67 IN

## 2022-04-28 DIAGNOSIS — S89.92XA INJURY OF LEFT KNEE, INITIAL ENCOUNTER: Primary | ICD-10-CM

## 2022-04-28 PROCEDURE — 96372 THER/PROPH/DIAG INJ SC/IM: CPT

## 2022-04-28 PROCEDURE — 25010000002 KETOROLAC TROMETHAMINE PER 15 MG: Performed by: PHYSICIAN ASSISTANT

## 2022-04-28 PROCEDURE — 73562 X-RAY EXAM OF KNEE 3: CPT

## 2022-04-28 PROCEDURE — 99283 EMERGENCY DEPT VISIT LOW MDM: CPT

## 2022-04-28 RX ORDER — TIZANIDINE 4 MG/1
4 TABLET ORAL EVERY 8 HOURS PRN
Qty: 30 TABLET | Refills: 0 | Status: ON HOLD | OUTPATIENT
Start: 2022-04-28 | End: 2022-09-09

## 2022-04-28 RX ORDER — DICLOFENAC SODIUM 75 MG/1
75 TABLET, DELAYED RELEASE ORAL 2 TIMES DAILY
Qty: 30 TABLET | Refills: 0 | Status: ON HOLD | OUTPATIENT
Start: 2022-04-28 | End: 2022-09-09

## 2022-04-28 RX ORDER — KETOROLAC TROMETHAMINE 30 MG/ML
60 INJECTION, SOLUTION INTRAMUSCULAR; INTRAVENOUS ONCE
Status: COMPLETED | OUTPATIENT
Start: 2022-04-28 | End: 2022-04-28

## 2022-04-28 RX ADMIN — KETOROLAC TROMETHAMINE 60 MG: 30 INJECTION, SOLUTION INTRAMUSCULAR; INTRAVENOUS at 04:40

## 2022-06-26 ENCOUNTER — APPOINTMENT (OUTPATIENT)
Dept: CT IMAGING | Facility: HOSPITAL | Age: 37
End: 2022-06-26

## 2022-06-26 ENCOUNTER — HOSPITAL ENCOUNTER (EMERGENCY)
Facility: HOSPITAL | Age: 37
Discharge: HOME | End: 2022-06-26
Attending: FAMILY MEDICINE | Admitting: STUDENT IN AN ORGANIZED HEALTH CARE EDUCATION/TRAINING PROGRAM

## 2022-06-26 VITALS
SYSTOLIC BLOOD PRESSURE: 127 MMHG | DIASTOLIC BLOOD PRESSURE: 85 MMHG | HEIGHT: 66 IN | WEIGHT: 220 LBS | OXYGEN SATURATION: 99 % | BODY MASS INDEX: 35.36 KG/M2 | TEMPERATURE: 98.3 F | RESPIRATION RATE: 18 BRPM | HEART RATE: 98 BPM

## 2022-06-26 DIAGNOSIS — T07.XXXA ABRASIONS OF MULTIPLE SITES: Primary | ICD-10-CM

## 2022-06-26 LAB
ALBUMIN SERPL-MCNC: 4.18 G/DL (ref 3.5–5.2)
ALBUMIN/GLOB SERPL: 1.4 G/DL
ALP SERPL-CCNC: 76 U/L (ref 39–117)
ALT SERPL W P-5'-P-CCNC: 24 U/L (ref 1–33)
ANION GAP SERPL CALCULATED.3IONS-SCNC: 8.9 MMOL/L (ref 5–15)
APAP SERPL-MCNC: <5 MCG/ML (ref 0–30)
AST SERPL-CCNC: 26 U/L (ref 1–32)
BASOPHILS # BLD AUTO: 0.03 10*3/MM3 (ref 0–0.2)
BASOPHILS NFR BLD AUTO: 0.4 % (ref 0–1.5)
BILIRUB SERPL-MCNC: 0.3 MG/DL (ref 0–1.2)
BUN SERPL-MCNC: 12 MG/DL (ref 6–20)
BUN/CREAT SERPL: 15.6 (ref 7–25)
CALCIUM SPEC-SCNC: 9.3 MG/DL (ref 8.6–10.5)
CHLORIDE SERPL-SCNC: 102 MMOL/L (ref 98–107)
CO2 SERPL-SCNC: 31.1 MMOL/L (ref 22–29)
CREAT SERPL-MCNC: 0.77 MG/DL (ref 0.57–1)
DEPRECATED RDW RBC AUTO: 40.6 FL (ref 37–54)
EGFRCR SERPLBLD CKD-EPI 2021: 104 ML/MIN/1.73
EOSINOPHIL # BLD AUTO: 0.56 10*3/MM3 (ref 0–0.4)
EOSINOPHIL NFR BLD AUTO: 7.8 % (ref 0.3–6.2)
ERYTHROCYTE [DISTWIDTH] IN BLOOD BY AUTOMATED COUNT: 12.6 % (ref 12.3–15.4)
GLOBULIN UR ELPH-MCNC: 2.9 GM/DL
GLUCOSE SERPL-MCNC: 83 MG/DL (ref 65–99)
HCT VFR BLD AUTO: 35.9 % (ref 34–46.6)
HGB BLD-MCNC: 11.6 G/DL (ref 12–15.9)
IMM GRANULOCYTES # BLD AUTO: 0.02 10*3/MM3 (ref 0–0.05)
IMM GRANULOCYTES NFR BLD AUTO: 0.3 % (ref 0–0.5)
LYMPHOCYTES # BLD AUTO: 2.74 10*3/MM3 (ref 0.7–3.1)
LYMPHOCYTES NFR BLD AUTO: 38.2 % (ref 19.6–45.3)
MCH RBC QN AUTO: 28.6 PG (ref 26.6–33)
MCHC RBC AUTO-ENTMCNC: 32.3 G/DL (ref 31.5–35.7)
MCV RBC AUTO: 88.4 FL (ref 79–97)
MONOCYTES # BLD AUTO: 0.58 10*3/MM3 (ref 0.1–0.9)
MONOCYTES NFR BLD AUTO: 8.1 % (ref 5–12)
NEUTROPHILS NFR BLD AUTO: 3.25 10*3/MM3 (ref 1.7–7)
NEUTROPHILS NFR BLD AUTO: 45.2 % (ref 42.7–76)
NRBC BLD AUTO-RTO: 0 /100 WBC (ref 0–0.2)
PLATELET # BLD AUTO: 210 10*3/MM3 (ref 140–450)
PMV BLD AUTO: 9.1 FL (ref 6–12)
POTASSIUM SERPL-SCNC: 3.4 MMOL/L (ref 3.5–5.2)
PROT SERPL-MCNC: 7.1 G/DL (ref 6–8.5)
RBC # BLD AUTO: 4.06 10*6/MM3 (ref 3.77–5.28)
SALICYLATES SERPL-MCNC: <0.3 MG/DL
SODIUM SERPL-SCNC: 142 MMOL/L (ref 136–145)
WBC NRBC COR # BLD: 7.18 10*3/MM3 (ref 3.4–10.8)

## 2022-06-26 PROCEDURE — 36415 COLL VENOUS BLD VENIPUNCTURE: CPT

## 2022-06-26 PROCEDURE — 25010000002 TETANUS-DIPHTH-ACELL PERTUSSIS 5-2.5-18.5 LF-MCG/0.5 SUSPENSION PREFILLED SYRINGE: Performed by: PHYSICIAN ASSISTANT

## 2022-06-26 PROCEDURE — 80143 DRUG ASSAY ACETAMINOPHEN: CPT | Performed by: PHYSICIAN ASSISTANT

## 2022-06-26 PROCEDURE — 90715 TDAP VACCINE 7 YRS/> IM: CPT | Performed by: PHYSICIAN ASSISTANT

## 2022-06-26 PROCEDURE — 99283 EMERGENCY DEPT VISIT LOW MDM: CPT

## 2022-06-26 PROCEDURE — 80179 DRUG ASSAY SALICYLATE: CPT | Performed by: PHYSICIAN ASSISTANT

## 2022-06-26 PROCEDURE — 85025 COMPLETE CBC W/AUTO DIFF WBC: CPT | Performed by: PHYSICIAN ASSISTANT

## 2022-06-26 PROCEDURE — 90471 IMMUNIZATION ADMIN: CPT | Performed by: PHYSICIAN ASSISTANT

## 2022-06-26 PROCEDURE — 80053 COMPREHEN METABOLIC PANEL: CPT | Performed by: PHYSICIAN ASSISTANT

## 2022-06-26 PROCEDURE — 70486 CT MAXILLOFACIAL W/O DYE: CPT

## 2022-06-26 RX ORDER — BACITRACIN, NEOMYCIN, POLYMYXIN B 400; 3.5; 5 [USP'U]/G; MG/G; [USP'U]/G
OINTMENT TOPICAL ONCE
Status: COMPLETED | OUTPATIENT
Start: 2022-06-26 | End: 2022-06-26

## 2022-06-26 RX ORDER — SODIUM CHLORIDE 0.9 % (FLUSH) 0.9 %
10 SYRINGE (ML) INJECTION AS NEEDED
Status: DISCONTINUED | OUTPATIENT
Start: 2022-06-26 | End: 2022-06-26 | Stop reason: HOSPADM

## 2022-06-26 RX ORDER — BACITRACIN, NEOMYCIN, POLYMYXIN B 400; 3.5; 5 [USP'U]/G; MG/G; [USP'U]/G
1 OINTMENT TOPICAL 2 TIMES DAILY
Qty: 14 G | Refills: 0 | Status: SHIPPED | OUTPATIENT
Start: 2022-06-26 | End: 2022-07-03

## 2022-06-26 RX ORDER — NALOXONE HYDROCHLORIDE 1 MG/ML
2 INJECTION INTRAMUSCULAR; INTRAVENOUS; SUBCUTANEOUS ONCE
Status: DISCONTINUED | OUTPATIENT
Start: 2022-06-26 | End: 2022-06-26

## 2022-06-26 RX ADMIN — SODIUM CHLORIDE 1000 ML: 9 INJECTION, SOLUTION INTRAVENOUS at 17:47

## 2022-06-26 RX ADMIN — BACITRACIN, NEOMYCIN, POLYMYXIN B: 400; 3.5; 5 OINTMENT TOPICAL at 14:48

## 2022-06-26 RX ADMIN — TETANUS TOXOID, REDUCED DIPHTHERIA TOXOID AND ACELLULAR PERTUSSIS VACCINE, ADSORBED 0.5 ML: 5; 2.5; 8; 8; 2.5 SUSPENSION INTRAMUSCULAR at 14:48
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
## 2022-07-12 ENCOUNTER — HOSPITAL ENCOUNTER (OUTPATIENT)
Dept: HOSPITAL 79 - KOH-I | Age: 37
End: 2022-07-12
Attending: NURSE PRACTITIONER
Payer: COMMERCIAL

## 2022-07-12 DIAGNOSIS — M51.37: ICD-10-CM

## 2022-07-12 DIAGNOSIS — M21.372: ICD-10-CM

## 2022-07-12 DIAGNOSIS — M54.16: Primary | ICD-10-CM

## 2022-09-07 ENCOUNTER — APPOINTMENT (OUTPATIENT)
Dept: GENERAL RADIOLOGY | Facility: HOSPITAL | Age: 37
End: 2022-09-07

## 2022-09-07 ENCOUNTER — HOSPITAL ENCOUNTER (INPATIENT)
Facility: HOSPITAL | Age: 37
LOS: 6 days | Discharge: HOME | End: 2022-09-14
Attending: STUDENT IN AN ORGANIZED HEALTH CARE EDUCATION/TRAINING PROGRAM | Admitting: INTERNAL MEDICINE

## 2022-09-07 DIAGNOSIS — R56.9 SEIZURES: ICD-10-CM

## 2022-09-07 DIAGNOSIS — T50.904A OVERDOSE OF UNDETERMINED INTENT, INITIAL ENCOUNTER: Primary | ICD-10-CM

## 2022-09-07 LAB
A-A DO2: 542.6 MMHG (ref 0–300)
ARTERIAL PATENCY WRIST A: POSITIVE
ATMOSPHERIC PRESS: 727 MMHG
BASE EXCESS BLDA CALC-SCNC: -4.4 MMOL/L (ref 0–2)
BDY SITE: ABNORMAL
BODY TEMPERATURE: 0 C
CO2 BLDA-SCNC: 22 MMOL/L (ref 22–33)
COHGB MFR BLD: 1 % (ref 0–5)
GLUCOSE BLDC GLUCOMTR-MCNC: 224 MG/DL (ref 70–130)
HCO3 BLDA-SCNC: 20.9 MMOL/L (ref 20–26)
HCT VFR BLD CALC: 37.1 % (ref 38–51)
HGB BLDA-MCNC: 12.1 G/DL (ref 13.5–17.5)
INHALED O2 CONCENTRATION: 100 %
Lab: ABNORMAL
METHGB BLD QL: <-0.1 % (ref 0–3)
MODALITY: ABNORMAL
NOTE: ABNORMAL
OXYHGB MFR BLDV: 96.8 % (ref 94–99)
PCO2 BLDA: 38.2 MM HG (ref 35–45)
PCO2 TEMP ADJ BLD: ABNORMAL MM[HG]
PEEP RESPIRATORY: 5 CM[H2O]
PH BLDA: 7.34 PH UNITS (ref 7.35–7.45)
PH, TEMP CORRECTED: ABNORMAL
PO2 BLDA: 99 MM HG (ref 83–108)
PO2 TEMP ADJ BLD: ABNORMAL MM[HG]
SAO2 % BLDCOA: 97.6 % (ref 94–99)
SET MECH RESP RATE: 25
VENTILATOR MODE: ABNORMAL
VT ON VENT VENT: 400 ML

## 2022-09-07 PROCEDURE — 0BH17EZ INSERTION OF ENDOTRACHEAL AIRWAY INTO TRACHEA, VIA NATURAL OR ARTIFICIAL OPENING: ICD-10-PCS | Performed by: EMERGENCY MEDICINE

## 2022-09-07 PROCEDURE — 82375 ASSAY CARBOXYHB QUANT: CPT

## 2022-09-07 PROCEDURE — 25010000002 PROPOFOL 1000 MG/100ML EMULSION

## 2022-09-07 PROCEDURE — C1751 CATH, INF, PER/CENT/MIDLINE: HCPCS

## 2022-09-07 PROCEDURE — 94799 UNLISTED PULMONARY SVC/PX: CPT

## 2022-09-07 PROCEDURE — 94002 VENT MGMT INPAT INIT DAY: CPT

## 2022-09-07 PROCEDURE — 82962 GLUCOSE BLOOD TEST: CPT

## 2022-09-07 PROCEDURE — 25010000002 DIPHENHYDRAMINE PER 50 MG: Performed by: STUDENT IN AN ORGANIZED HEALTH CARE EDUCATION/TRAINING PROGRAM

## 2022-09-07 PROCEDURE — 25010000002 SUCCINYLCHOLINE PER 20 MG: Performed by: STUDENT IN AN ORGANIZED HEALTH CARE EDUCATION/TRAINING PROGRAM

## 2022-09-07 PROCEDURE — 36600 WITHDRAWAL OF ARTERIAL BLOOD: CPT

## 2022-09-07 PROCEDURE — 83050 HGB METHEMOGLOBIN QUAN: CPT

## 2022-09-07 PROCEDURE — 25010000002 PROPOFOL 10 MG/ML EMULSION: Performed by: STUDENT IN AN ORGANIZED HEALTH CARE EDUCATION/TRAINING PROGRAM

## 2022-09-07 PROCEDURE — 25010000002 MIDAZOLAM PER 1 MG

## 2022-09-07 PROCEDURE — 25010000002 LORAZEPAM PER 2 MG: Performed by: STUDENT IN AN ORGANIZED HEALTH CARE EDUCATION/TRAINING PROGRAM

## 2022-09-07 PROCEDURE — 25010000002 LORAZEPAM PER 2 MG

## 2022-09-07 PROCEDURE — 71045 X-RAY EXAM CHEST 1 VIEW: CPT

## 2022-09-07 PROCEDURE — 25010000002 HALOPERIDOL LACTATE PER 5 MG: Performed by: STUDENT IN AN ORGANIZED HEALTH CARE EDUCATION/TRAINING PROGRAM

## 2022-09-07 PROCEDURE — 99223 1ST HOSP IP/OBS HIGH 75: CPT | Performed by: INTERNAL MEDICINE

## 2022-09-07 PROCEDURE — 99291 CRITICAL CARE FIRST HOUR: CPT

## 2022-09-07 PROCEDURE — 25010000002 PROPOFOL 200 MG/20ML EMULSION

## 2022-09-07 PROCEDURE — 82805 BLOOD GASES W/O2 SATURATION: CPT

## 2022-09-07 PROCEDURE — 5A1955Z RESPIRATORY VENTILATION, GREATER THAN 96 CONSECUTIVE HOURS: ICD-10-PCS | Performed by: EMERGENCY MEDICINE

## 2022-09-07 PROCEDURE — 25010000002 SUCCINYLCHOLINE PER 20 MG

## 2022-09-07 PROCEDURE — 93005 ELECTROCARDIOGRAM TRACING: CPT | Performed by: STUDENT IN AN ORGANIZED HEALTH CARE EDUCATION/TRAINING PROGRAM

## 2022-09-07 RX ORDER — ETOMIDATE 2 MG/ML
20 INJECTION INTRAVENOUS ONCE
Status: COMPLETED | OUTPATIENT
Start: 2022-09-07 | End: 2022-09-07

## 2022-09-07 RX ORDER — LORAZEPAM 2 MG/ML
2 INJECTION INTRAMUSCULAR ONCE
Status: COMPLETED | OUTPATIENT
Start: 2022-09-07 | End: 2022-09-07

## 2022-09-07 RX ORDER — LORAZEPAM 2 MG/ML
INJECTION INTRAMUSCULAR
Status: COMPLETED
Start: 2022-09-07 | End: 2022-09-07

## 2022-09-07 RX ORDER — DIPHENHYDRAMINE HYDROCHLORIDE 50 MG/ML
50 INJECTION INTRAMUSCULAR; INTRAVENOUS ONCE
Status: COMPLETED | OUTPATIENT
Start: 2022-09-07 | End: 2022-09-07

## 2022-09-07 RX ORDER — LORAZEPAM 2 MG/ML
INJECTION INTRAMUSCULAR
Status: DISPENSED
Start: 2022-09-07 | End: 2022-09-08

## 2022-09-07 RX ORDER — MIDAZOLAM IN NACL,ISO-OSMOT/PF 50 MG/50ML
1-10 INFUSION BOTTLE (ML) INTRAVENOUS
Status: DISCONTINUED | OUTPATIENT
Start: 2022-09-07 | End: 2022-09-12

## 2022-09-07 RX ORDER — DIPHENHYDRAMINE HYDROCHLORIDE 50 MG/ML
50 INJECTION INTRAMUSCULAR; INTRAVENOUS ONCE
Status: DISCONTINUED | OUTPATIENT
Start: 2022-09-07 | End: 2022-09-07

## 2022-09-07 RX ORDER — MIDAZOLAM HYDROCHLORIDE 1 MG/ML
INJECTION INTRAMUSCULAR; INTRAVENOUS
Status: COMPLETED
Start: 2022-09-07 | End: 2022-09-07

## 2022-09-07 RX ORDER — LORAZEPAM 2 MG/ML
1 INJECTION INTRAMUSCULAR ONCE
Status: DISCONTINUED | OUTPATIENT
Start: 2022-09-07 | End: 2022-09-07

## 2022-09-07 RX ORDER — DIPHENHYDRAMINE HYDROCHLORIDE 50 MG/ML
25 INJECTION INTRAMUSCULAR; INTRAVENOUS ONCE
Status: DISCONTINUED | OUTPATIENT
Start: 2022-09-07 | End: 2022-09-07

## 2022-09-07 RX ORDER — HALOPERIDOL 5 MG/ML
10 INJECTION INTRAMUSCULAR ONCE
Status: CANCELLED | OUTPATIENT
Start: 2022-09-07 | End: 2022-09-07

## 2022-09-07 RX ORDER — SUCCINYLCHOLINE CHLORIDE 20 MG/ML
100 INJECTION INTRAMUSCULAR; INTRAVENOUS ONCE
Status: COMPLETED | OUTPATIENT
Start: 2022-09-07 | End: 2022-09-07

## 2022-09-07 RX ORDER — MIDAZOLAM HYDROCHLORIDE 1 MG/ML
2 INJECTION INTRAMUSCULAR; INTRAVENOUS ONCE
Status: COMPLETED | OUTPATIENT
Start: 2022-09-07 | End: 2022-09-07

## 2022-09-07 RX ORDER — PROPOFOL 10 MG/ML
INJECTION, EMULSION INTRAVENOUS
Status: COMPLETED
Start: 2022-09-07 | End: 2022-09-07

## 2022-09-07 RX ORDER — HALOPERIDOL 5 MG/ML
10 INJECTION INTRAMUSCULAR ONCE
Status: COMPLETED | OUTPATIENT
Start: 2022-09-07 | End: 2022-09-07

## 2022-09-07 RX ADMIN — MIDAZOLAM HYDROCHLORIDE 2 MG: 1 INJECTION INTRAMUSCULAR; INTRAVENOUS at 21:59

## 2022-09-07 RX ADMIN — HALOPERIDOL LACTATE 10 MG: 5 INJECTION, SOLUTION INTRAMUSCULAR at 21:20

## 2022-09-07 RX ADMIN — Medication 10 MG/HR: at 22:17

## 2022-09-07 RX ADMIN — LORAZEPAM 2 MG: 2 INJECTION, SOLUTION INTRAMUSCULAR; INTRAVENOUS at 21:20

## 2022-09-07 RX ADMIN — PROPOFOL 50 MCG/KG/MIN: 10 INJECTION, EMULSION INTRAVENOUS at 23:15

## 2022-09-07 RX ADMIN — LORAZEPAM 2 MG: 2 INJECTION INTRAMUSCULAR at 21:46

## 2022-09-07 RX ADMIN — LORAZEPAM 2 MG: 2 INJECTION, SOLUTION INTRAMUSCULAR; INTRAVENOUS at 21:46

## 2022-09-07 RX ADMIN — DIPHENHYDRAMINE HYDROCHLORIDE 50 MG: 50 INJECTION INTRAMUSCULAR; INTRAVENOUS at 21:20

## 2022-09-07 RX ADMIN — SUCCINYLCHOLINE CHLORIDE 100 MG: 20 INJECTION, SOLUTION INTRAMUSCULAR; INTRAVENOUS at 21:54

## 2022-09-07 RX ADMIN — PROPOFOL 100 MG: 10 INJECTION, EMULSION INTRAVENOUS at 22:30

## 2022-09-07 RX ADMIN — ROCURONIUM BROMIDE 100 MG: 10 SOLUTION INTRAVENOUS at 23:12

## 2022-09-07 RX ADMIN — PROPOFOL 25 MCG/KG/MIN: 10 INJECTION, EMULSION INTRAVENOUS at 23:12

## 2022-09-07 RX ADMIN — ETOMIDATE 20 MG: 2 INJECTION, SOLUTION INTRAVENOUS at 21:54

## 2022-09-07 RX ADMIN — MIDAZOLAM 2 MG: 1 INJECTION INTRAMUSCULAR; INTRAVENOUS at 21:59

## 2022-09-08 ENCOUNTER — APPOINTMENT (OUTPATIENT)
Dept: GENERAL RADIOLOGY | Facility: HOSPITAL | Age: 37
End: 2022-09-08

## 2022-09-08 ENCOUNTER — APPOINTMENT (OUTPATIENT)
Dept: RESPIRATORY THERAPY | Facility: HOSPITAL | Age: 37
End: 2022-09-08

## 2022-09-08 ENCOUNTER — APPOINTMENT (OUTPATIENT)
Dept: CT IMAGING | Facility: HOSPITAL | Age: 37
End: 2022-09-08

## 2022-09-08 ENCOUNTER — APPOINTMENT (OUTPATIENT)
Dept: CARDIOLOGY | Facility: HOSPITAL | Age: 37
End: 2022-09-08

## 2022-09-08 PROBLEM — T50.904A OVERDOSE OF UNDETERMINED INTENT, INITIAL ENCOUNTER: Status: ACTIVE | Noted: 2022-09-08

## 2022-09-08 LAB
A-A DO2: 217.3 MMHG (ref 0–300)
A-A DO2: 464.1 MMHG (ref 0–300)
A-A DO2: 566 MMHG (ref 0–300)
ALBUMIN SERPL-MCNC: 4.31 G/DL (ref 3.5–5.2)
ALBUMIN/GLOB SERPL: 1.4 G/DL
ALP SERPL-CCNC: 91 U/L (ref 39–117)
ALT SERPL W P-5'-P-CCNC: 26 U/L (ref 1–33)
AMMONIA BLD-SCNC: 25 UMOL/L (ref 11–51)
AMPHET+METHAMPHET UR QL: POSITIVE
AMPHETAMINES UR QL: POSITIVE
ANION GAP SERPL CALCULATED.3IONS-SCNC: 12.9 MMOL/L (ref 5–15)
APAP SERPL-MCNC: <5 MCG/ML (ref 0–30)
APTT PPP: 25.1 SECONDS (ref 26.5–34.5)
ARTERIAL PATENCY WRIST A: ABNORMAL
ARTERIAL PATENCY WRIST A: POSITIVE
ARTERIAL PATENCY WRIST A: POSITIVE
AST SERPL-CCNC: 47 U/L (ref 1–32)
ATMOSPHERIC PRESS: 726 MMHG
B-HCG UR QL: NEGATIVE
BACTERIA UR QL AUTO: NORMAL /HPF
BARBITURATES UR QL SCN: NEGATIVE
BASE EXCESS BLDA CALC-SCNC: -1.7 MMOL/L (ref 0–2)
BASE EXCESS BLDA CALC-SCNC: -3.3 MMOL/L (ref 0–2)
BASE EXCESS BLDA CALC-SCNC: -3.4 MMOL/L (ref 0–2)
BASOPHILS # BLD AUTO: 0.04 10*3/MM3 (ref 0–0.2)
BASOPHILS NFR BLD AUTO: 0.2 % (ref 0–1.5)
BDY SITE: ABNORMAL
BENZODIAZ UR QL SCN: POSITIVE
BILIRUB SERPL-MCNC: 0.4 MG/DL (ref 0–1.2)
BILIRUB UR QL STRIP: NEGATIVE
BODY TEMPERATURE: 0 C
BUN SERPL-MCNC: 8 MG/DL (ref 6–20)
BUN/CREAT SERPL: 11.3 (ref 7–25)
BUPRENORPHINE SERPL-MCNC: NEGATIVE NG/ML
CALCIUM SPEC-SCNC: 8.9 MG/DL (ref 8.6–10.5)
CANNABINOIDS SERPL QL: NEGATIVE
CHLORIDE SERPL-SCNC: 104 MMOL/L (ref 98–107)
CK SERPL-CCNC: 1775 U/L (ref 20–180)
CK SERPL-CCNC: 8122 U/L (ref 20–180)
CLARITY UR: ABNORMAL
CO2 BLDA-SCNC: 23.6 MMOL/L (ref 22–33)
CO2 BLDA-SCNC: 23.8 MMOL/L (ref 22–33)
CO2 BLDA-SCNC: 24.6 MMOL/L (ref 22–33)
CO2 SERPL-SCNC: 22.1 MMOL/L (ref 22–29)
COCAINE UR QL: NEGATIVE
COHGB MFR BLD: 0.6 % (ref 0–5)
COHGB MFR BLD: 0.8 % (ref 0–5)
COHGB MFR BLD: 0.9 % (ref 0–5)
COLOR UR: YELLOW
CREAT SERPL-MCNC: 0.71 MG/DL (ref 0.57–1)
D-LACTATE SERPL-SCNC: 1.8 MMOL/L (ref 0.5–2)
DEPRECATED RDW RBC AUTO: 41.4 FL (ref 37–54)
EGFRCR SERPLBLD CKD-EPI 2021: 114.6 ML/MIN/1.73
EOSINOPHIL # BLD AUTO: 0.01 10*3/MM3 (ref 0–0.4)
EOSINOPHIL NFR BLD AUTO: 0 % (ref 0.3–6.2)
ERYTHROCYTE [DISTWIDTH] IN BLOOD BY AUTOMATED COUNT: 13.2 % (ref 12.3–15.4)
ETHANOL BLD-MCNC: <10 MG/DL (ref 0–10)
ETHANOL UR QL: <0.01 %
FLUAV RNA RESP QL NAA+PROBE: NOT DETECTED
FLUBV RNA RESP QL NAA+PROBE: NOT DETECTED
GLOBULIN UR ELPH-MCNC: 3.2 GM/DL
GLUCOSE BLDC GLUCOMTR-MCNC: 143 MG/DL (ref 70–130)
GLUCOSE SERPL-MCNC: 173 MG/DL (ref 65–99)
GLUCOSE UR STRIP-MCNC: ABNORMAL MG/DL
HAV IGM SERPL QL IA: ABNORMAL
HBV CORE IGM SERPL QL IA: ABNORMAL
HBV SURFACE AG SERPL QL IA: ABNORMAL
HCO3 BLDA-SCNC: 22.5 MMOL/L (ref 20–26)
HCO3 BLDA-SCNC: 22.5 MMOL/L (ref 20–26)
HCO3 BLDA-SCNC: 23.2 MMOL/L (ref 20–26)
HCT VFR BLD AUTO: 35.8 % (ref 34–46.6)
HCT VFR BLD CALC: 33.7 % (ref 38–51)
HCT VFR BLD CALC: 33.8 % (ref 38–51)
HCT VFR BLD CALC: 34.1 % (ref 38–51)
HCV AB SER DONR QL: REACTIVE
HGB BLD-MCNC: 11.5 G/DL (ref 12–15.9)
HGB BLDA-MCNC: 11 G/DL (ref 13.5–17.5)
HGB BLDA-MCNC: 11 G/DL (ref 13.5–17.5)
HGB BLDA-MCNC: 11.1 G/DL (ref 13.5–17.5)
HGB UR QL STRIP.AUTO: ABNORMAL
HIV1+2 AB SER QL: NORMAL
HOLD SPECIMEN: NORMAL
HOLD SPECIMEN: NORMAL
HYALINE CASTS UR QL AUTO: NORMAL /LPF
IMM GRANULOCYTES # BLD AUTO: 0.1 10*3/MM3 (ref 0–0.05)
IMM GRANULOCYTES NFR BLD AUTO: 0.4 % (ref 0–0.5)
INHALED O2 CONCENTRATION: 100 %
INHALED O2 CONCENTRATION: 100 %
INHALED O2 CONCENTRATION: 50 %
INR PPP: 1.02 (ref 0.9–1.1)
KETONES UR QL STRIP: ABNORMAL
LEUKOCYTE ESTERASE UR QL STRIP.AUTO: NEGATIVE
LYMPHOCYTES # BLD AUTO: 0.54 10*3/MM3 (ref 0.7–3.1)
LYMPHOCYTES NFR BLD AUTO: 2.4 % (ref 19.6–45.3)
Lab: ABNORMAL
MAGNESIUM SERPL-MCNC: 2.2 MG/DL (ref 1.6–2.6)
MAGNESIUM SERPL-MCNC: 2.2 MG/DL (ref 1.6–2.6)
MCH RBC QN AUTO: 27.8 PG (ref 26.6–33)
MCHC RBC AUTO-ENTMCNC: 32.1 G/DL (ref 31.5–35.7)
MCV RBC AUTO: 86.5 FL (ref 79–97)
METHADONE UR QL SCN: POSITIVE
METHGB BLD QL: 0.1 % (ref 0–3)
METHGB BLD QL: 0.2 % (ref 0–3)
METHGB BLD QL: 0.4 % (ref 0–3)
MODALITY: ABNORMAL
MONOCYTES # BLD AUTO: 0.96 10*3/MM3 (ref 0.1–0.9)
MONOCYTES NFR BLD AUTO: 4.3 % (ref 5–12)
MRSA DNA SPEC QL NAA+PROBE: ABNORMAL
NEUTROPHILS NFR BLD AUTO: 20.92 10*3/MM3 (ref 1.7–7)
NEUTROPHILS NFR BLD AUTO: 92.7 % (ref 42.7–76)
NITRITE UR QL STRIP: NEGATIVE
NOTE: ABNORMAL
NOTIFIED BY: ABNORMAL
NOTIFIED WHO: ABNORMAL
NRBC BLD AUTO-RTO: 0 /100 WBC (ref 0–0.2)
OPIATES UR QL: NEGATIVE
OXYCODONE UR QL SCN: NEGATIVE
OXYHGB MFR BLDV: 89.9 % (ref 94–99)
OXYHGB MFR BLDV: 95.7 % (ref 94–99)
OXYHGB MFR BLDV: 98.8 % (ref 94–99)
PCO2 BLDA: 35.2 MM HG (ref 35–45)
PCO2 BLDA: 42.8 MM HG (ref 35–45)
PCO2 BLDA: 47.1 MM HG (ref 35–45)
PCO2 TEMP ADJ BLD: ABNORMAL MM[HG]
PCP UR QL SCN: NEGATIVE
PEEP RESPIRATORY: 5 CM[H2O]
PH BLDA: 7.3 PH UNITS (ref 7.35–7.45)
PH BLDA: 7.33 PH UNITS (ref 7.35–7.45)
PH BLDA: 7.41 PH UNITS (ref 7.35–7.45)
PH UR STRIP.AUTO: 5.5 [PH] (ref 5–8)
PH, TEMP CORRECTED: ABNORMAL
PHOSPHATE SERPL-MCNC: 2.6 MG/DL (ref 2.5–4.5)
PLATELET # BLD AUTO: 266 10*3/MM3 (ref 140–450)
PMV BLD AUTO: 8.1 FL (ref 6–12)
PO2 BLDA: 172 MM HG (ref 83–108)
PO2 BLDA: 66.1 MM HG (ref 83–108)
PO2 BLDA: 84.3 MM HG (ref 83–108)
PO2 TEMP ADJ BLD: ABNORMAL MM[HG]
POTASSIUM SERPL-SCNC: 3.3 MMOL/L (ref 3.5–5.2)
PROCALCITONIN SERPL-MCNC: 0.06 NG/ML (ref 0–0.25)
PROPOXYPH UR QL: NEGATIVE
PROT SERPL-MCNC: 7.5 G/DL (ref 6–8.5)
PROT UR QL STRIP: ABNORMAL
PROTHROMBIN TIME: 13.6 SECONDS (ref 12.1–14.7)
RBC # BLD AUTO: 4.14 10*6/MM3 (ref 3.77–5.28)
RBC # UR STRIP: NORMAL /HPF
REF LAB TEST METHOD: NORMAL
SALICYLATES SERPL-MCNC: <0.3 MG/DL
SAO2 % BLDCOA: 90.8 % (ref 94–99)
SAO2 % BLDCOA: 96.9 % (ref 94–99)
SAO2 % BLDCOA: >99.2 % (ref 94–99)
SARS-COV-2 RNA RESP QL NAA+PROBE: NOT DETECTED
SET MECH RESP RATE: 25
SODIUM SERPL-SCNC: 139 MMOL/L (ref 136–145)
SP GR UR STRIP: 1.02 (ref 1–1.03)
SQUAMOUS #/AREA URNS HPF: NORMAL /HPF
TRICYCLICS UR QL SCN: NEGATIVE
TROPONIN T SERPL-MCNC: <0.01 NG/ML (ref 0–0.03)
TSH SERPL DL<=0.05 MIU/L-ACNC: 0.49 UIU/ML (ref 0.27–4.2)
UROBILINOGEN UR QL STRIP: ABNORMAL
VENTILATOR MODE: ABNORMAL
VT ON VENT VENT: 400 ML
WBC # UR STRIP: NORMAL /HPF
WBC NRBC COR # BLD: 22.57 10*3/MM3 (ref 3.4–10.8)
WHOLE BLOOD HOLD COAG: NORMAL
WHOLE BLOOD HOLD SPECIMEN: NORMAL

## 2022-09-08 PROCEDURE — 0 LEVETIRACETAM IN NACL 0.54% 1500 MG/100ML SOLUTION: Performed by: STUDENT IN AN ORGANIZED HEALTH CARE EDUCATION/TRAINING PROGRAM

## 2022-09-08 PROCEDURE — 70450 CT HEAD/BRAIN W/O DYE: CPT

## 2022-09-08 PROCEDURE — 02HV33Z INSERTION OF INFUSION DEVICE INTO SUPERIOR VENA CAVA, PERCUTANEOUS APPROACH: ICD-10-PCS | Performed by: INTERNAL MEDICINE

## 2022-09-08 PROCEDURE — 87040 BLOOD CULTURE FOR BACTERIA: CPT | Performed by: STUDENT IN AN ORGANIZED HEALTH CARE EDUCATION/TRAINING PROGRAM

## 2022-09-08 PROCEDURE — 80179 DRUG ASSAY SALICYLATE: CPT | Performed by: STUDENT IN AN ORGANIZED HEALTH CARE EDUCATION/TRAINING PROGRAM

## 2022-09-08 PROCEDURE — C1751 CATH, INF, PER/CENT/MIDLINE: HCPCS

## 2022-09-08 PROCEDURE — 87636 SARSCOV2 & INF A&B AMP PRB: CPT | Performed by: STUDENT IN AN ORGANIZED HEALTH CARE EDUCATION/TRAINING PROGRAM

## 2022-09-08 PROCEDURE — 94003 VENT MGMT INPAT SUBQ DAY: CPT

## 2022-09-08 PROCEDURE — 94799 UNLISTED PULMONARY SVC/PX: CPT

## 2022-09-08 PROCEDURE — 25010000002 LORAZEPAM PER 2 MG: Performed by: INTERNAL MEDICINE

## 2022-09-08 PROCEDURE — 71045 X-RAY EXAM CHEST 1 VIEW: CPT

## 2022-09-08 PROCEDURE — 83735 ASSAY OF MAGNESIUM: CPT | Performed by: INTERNAL MEDICINE

## 2022-09-08 PROCEDURE — 25010000002 PROPOFOL 1000 MG/100ML EMULSION

## 2022-09-08 PROCEDURE — 80143 DRUG ASSAY ACETAMINOPHEN: CPT | Performed by: STUDENT IN AN ORGANIZED HEALTH CARE EDUCATION/TRAINING PROGRAM

## 2022-09-08 PROCEDURE — 25010000002 PROPOFOL 1000 MG/100ML EMULSION: Performed by: INTERNAL MEDICINE

## 2022-09-08 PROCEDURE — 36600 WITHDRAWAL OF ARTERIAL BLOOD: CPT

## 2022-09-08 PROCEDURE — 25010000002 PIPERACILLIN SOD-TAZOBACTAM PER 1 G: Performed by: INTERNAL MEDICINE

## 2022-09-08 PROCEDURE — 82375 ASSAY CARBOXYHB QUANT: CPT

## 2022-09-08 PROCEDURE — 84145 PROCALCITONIN (PCT): CPT | Performed by: STUDENT IN AN ORGANIZED HEALTH CARE EDUCATION/TRAINING PROGRAM

## 2022-09-08 PROCEDURE — 71250 CT THORAX DX C-: CPT

## 2022-09-08 PROCEDURE — 87076 CULTURE ANAEROBE IDENT EACH: CPT | Performed by: STUDENT IN AN ORGANIZED HEALTH CARE EDUCATION/TRAINING PROGRAM

## 2022-09-08 PROCEDURE — 85610 PROTHROMBIN TIME: CPT | Performed by: STUDENT IN AN ORGANIZED HEALTH CARE EDUCATION/TRAINING PROGRAM

## 2022-09-08 PROCEDURE — 80306 DRUG TEST PRSMV INSTRMNT: CPT | Performed by: STUDENT IN AN ORGANIZED HEALTH CARE EDUCATION/TRAINING PROGRAM

## 2022-09-08 PROCEDURE — 93010 ELECTROCARDIOGRAM REPORT: CPT | Performed by: INTERNAL MEDICINE

## 2022-09-08 PROCEDURE — 84443 ASSAY THYROID STIM HORMONE: CPT | Performed by: STUDENT IN AN ORGANIZED HEALTH CARE EDUCATION/TRAINING PROGRAM

## 2022-09-08 PROCEDURE — 25010000002 LEVETRIRACETAM PER 10 MG: Performed by: INTERNAL MEDICINE

## 2022-09-08 PROCEDURE — 25010000002 CEFEPIME PER 500 MG: Performed by: INTERNAL MEDICINE

## 2022-09-08 PROCEDURE — 83605 ASSAY OF LACTIC ACID: CPT | Performed by: STUDENT IN AN ORGANIZED HEALTH CARE EDUCATION/TRAINING PROGRAM

## 2022-09-08 PROCEDURE — 25010000002 LORAZEPAM PER 2 MG

## 2022-09-08 PROCEDURE — 95819 EEG AWAKE AND ASLEEP: CPT

## 2022-09-08 PROCEDURE — 83735 ASSAY OF MAGNESIUM: CPT | Performed by: STUDENT IN AN ORGANIZED HEALTH CARE EDUCATION/TRAINING PROGRAM

## 2022-09-08 PROCEDURE — 80074 ACUTE HEPATITIS PANEL: CPT | Performed by: INTERNAL MEDICINE

## 2022-09-08 PROCEDURE — 93005 ELECTROCARDIOGRAM TRACING: CPT | Performed by: INTERNAL MEDICINE

## 2022-09-08 PROCEDURE — 84484 ASSAY OF TROPONIN QUANT: CPT | Performed by: STUDENT IN AN ORGANIZED HEALTH CARE EDUCATION/TRAINING PROGRAM

## 2022-09-08 PROCEDURE — 81025 URINE PREGNANCY TEST: CPT | Performed by: STUDENT IN AN ORGANIZED HEALTH CARE EDUCATION/TRAINING PROGRAM

## 2022-09-08 PROCEDURE — G0432 EIA HIV-1/HIV-2 SCREEN: HCPCS | Performed by: INTERNAL MEDICINE

## 2022-09-08 PROCEDURE — 99222 1ST HOSP IP/OBS MODERATE 55: CPT | Performed by: NURSE PRACTITIONER

## 2022-09-08 PROCEDURE — 80171 DRUG SCREEN QUANT GABAPENTIN: CPT | Performed by: EMERGENCY MEDICINE

## 2022-09-08 PROCEDURE — 87150 DNA/RNA AMPLIFIED PROBE: CPT | Performed by: STUDENT IN AN ORGANIZED HEALTH CARE EDUCATION/TRAINING PROGRAM

## 2022-09-08 PROCEDURE — 25010000002 PROPOFOL 10 MG/ML EMULSION: Performed by: STUDENT IN AN ORGANIZED HEALTH CARE EDUCATION/TRAINING PROGRAM

## 2022-09-08 PROCEDURE — 74176 CT ABD & PELVIS W/O CONTRAST: CPT

## 2022-09-08 PROCEDURE — 25010000002 PROPOFOL 10 MG/ML EMULSION: Performed by: INTERNAL MEDICINE

## 2022-09-08 PROCEDURE — 82550 ASSAY OF CK (CPK): CPT | Performed by: STUDENT IN AN ORGANIZED HEALTH CARE EDUCATION/TRAINING PROGRAM

## 2022-09-08 PROCEDURE — 85730 THROMBOPLASTIN TIME PARTIAL: CPT | Performed by: STUDENT IN AN ORGANIZED HEALTH CARE EDUCATION/TRAINING PROGRAM

## 2022-09-08 PROCEDURE — 82077 ASSAY SPEC XCP UR&BREATH IA: CPT | Performed by: STUDENT IN AN ORGANIZED HEALTH CARE EDUCATION/TRAINING PROGRAM

## 2022-09-08 PROCEDURE — 82140 ASSAY OF AMMONIA: CPT | Performed by: STUDENT IN AN ORGANIZED HEALTH CARE EDUCATION/TRAINING PROGRAM

## 2022-09-08 PROCEDURE — 84100 ASSAY OF PHOSPHORUS: CPT | Performed by: STUDENT IN AN ORGANIZED HEALTH CARE EDUCATION/TRAINING PROGRAM

## 2022-09-08 PROCEDURE — 82962 GLUCOSE BLOOD TEST: CPT

## 2022-09-08 PROCEDURE — 82805 BLOOD GASES W/O2 SATURATION: CPT

## 2022-09-08 PROCEDURE — 87641 MR-STAPH DNA AMP PROBE: CPT | Performed by: PHYSICIAN ASSISTANT

## 2022-09-08 PROCEDURE — 25010000002 CEFEPIME PER 500 MG: Performed by: STUDENT IN AN ORGANIZED HEALTH CARE EDUCATION/TRAINING PROGRAM

## 2022-09-08 PROCEDURE — 82550 ASSAY OF CK (CPK): CPT | Performed by: INTERNAL MEDICINE

## 2022-09-08 PROCEDURE — 25010000002 HEPARIN (PORCINE) PER 1000 UNITS: Performed by: INTERNAL MEDICINE

## 2022-09-08 PROCEDURE — 71045 X-RAY EXAM CHEST 1 VIEW: CPT | Performed by: RADIOLOGY

## 2022-09-08 PROCEDURE — 80053 COMPREHEN METABOLIC PANEL: CPT | Performed by: STUDENT IN AN ORGANIZED HEALTH CARE EDUCATION/TRAINING PROGRAM

## 2022-09-08 PROCEDURE — 81001 URINALYSIS AUTO W/SCOPE: CPT

## 2022-09-08 PROCEDURE — 85025 COMPLETE CBC W/AUTO DIFF WBC: CPT | Performed by: STUDENT IN AN ORGANIZED HEALTH CARE EDUCATION/TRAINING PROGRAM

## 2022-09-08 PROCEDURE — 83050 HGB METHEMOGLOBIN QUAN: CPT

## 2022-09-08 PROCEDURE — 99291 CRITICAL CARE FIRST HOUR: CPT | Performed by: INTERNAL MEDICINE

## 2022-09-08 PROCEDURE — 94761 N-INVAS EAR/PLS OXIMETRY MLT: CPT

## 2022-09-08 RX ORDER — ALPRAZOLAM 1 MG/1
2 TABLET ORAL EVERY 8 HOURS SCHEDULED
Status: DISCONTINUED | OUTPATIENT
Start: 2022-09-08 | End: 2022-09-11

## 2022-09-08 RX ORDER — GABAPENTIN 400 MG/1
800 CAPSULE ORAL 3 TIMES DAILY
Status: DISCONTINUED | OUTPATIENT
Start: 2022-09-08 | End: 2022-09-14 | Stop reason: HOSPADM

## 2022-09-08 RX ORDER — FAMOTIDINE 10 MG/ML
20 INJECTION, SOLUTION INTRAVENOUS EVERY 12 HOURS SCHEDULED
Status: DISCONTINUED | OUTPATIENT
Start: 2022-09-08 | End: 2022-09-14

## 2022-09-08 RX ORDER — NOREPINEPHRINE BIT/0.9 % NACL 8 MG/250ML
.02-.3 INFUSION BOTTLE (ML) INTRAVENOUS
Status: DISCONTINUED | OUTPATIENT
Start: 2022-09-08 | End: 2022-09-14

## 2022-09-08 RX ORDER — LEVETIRACETAM 15 MG/ML
1500 INJECTION INTRAVASCULAR ONCE
Status: COMPLETED | OUTPATIENT
Start: 2022-09-08 | End: 2022-09-08

## 2022-09-08 RX ORDER — HEPARIN SODIUM 5000 [USP'U]/ML
5000 INJECTION, SOLUTION INTRAVENOUS; SUBCUTANEOUS EVERY 8 HOURS SCHEDULED
Status: DISCONTINUED | OUTPATIENT
Start: 2022-09-08 | End: 2022-09-13

## 2022-09-08 RX ORDER — LORAZEPAM 2 MG/ML
4 INJECTION INTRAMUSCULAR
Status: DISCONTINUED | OUTPATIENT
Start: 2022-09-08 | End: 2022-09-11

## 2022-09-08 RX ORDER — ALPRAZOLAM 1 MG/1
1 TABLET ORAL EVERY 8 HOURS SCHEDULED
Status: DISCONTINUED | OUTPATIENT
Start: 2022-09-08 | End: 2022-09-08

## 2022-09-08 RX ORDER — PROPOFOL 10 MG/ML
10 INJECTION, EMULSION INTRAVENOUS ONCE
Status: COMPLETED | OUTPATIENT
Start: 2022-09-08 | End: 2022-09-08

## 2022-09-08 RX ORDER — SODIUM CHLORIDE 9 MG/ML
100 INJECTION, SOLUTION INTRAVENOUS CONTINUOUS
Status: DISCONTINUED | OUTPATIENT
Start: 2022-09-08 | End: 2022-09-08

## 2022-09-08 RX ORDER — POTASSIUM CHLORIDE 20 MEQ/1
40 TABLET, EXTENDED RELEASE ORAL AS NEEDED
Status: DISCONTINUED | OUTPATIENT
Start: 2022-09-08 | End: 2022-09-14 | Stop reason: HOSPADM

## 2022-09-08 RX ORDER — LORAZEPAM 2 MG/ML
INJECTION INTRAMUSCULAR
Status: COMPLETED
Start: 2022-09-08 | End: 2022-09-08

## 2022-09-08 RX ORDER — SODIUM CHLORIDE 0.9 % (FLUSH) 0.9 %
10 SYRINGE (ML) INJECTION AS NEEDED
Status: DISCONTINUED | OUTPATIENT
Start: 2022-09-08 | End: 2022-09-14 | Stop reason: HOSPADM

## 2022-09-08 RX ORDER — METRONIDAZOLE 500 MG/100ML
500 INJECTION, SOLUTION INTRAVENOUS ONCE
Status: COMPLETED | OUTPATIENT
Start: 2022-09-08 | End: 2022-09-08

## 2022-09-08 RX ORDER — MAGNESIUM SULFATE HEPTAHYDRATE 40 MG/ML
2 INJECTION, SOLUTION INTRAVENOUS AS NEEDED
Status: DISCONTINUED | OUTPATIENT
Start: 2022-09-08 | End: 2022-09-14 | Stop reason: HOSPADM

## 2022-09-08 RX ORDER — SODIUM CHLORIDE 0.9 % (FLUSH) 0.9 %
10 SYRINGE (ML) INJECTION EVERY 12 HOURS SCHEDULED
Status: DISCONTINUED | OUTPATIENT
Start: 2022-09-08 | End: 2022-09-14 | Stop reason: HOSPADM

## 2022-09-08 RX ORDER — GLYCOPYRROLATE 0.2 MG/ML
0.4 INJECTION INTRAMUSCULAR; INTRAVENOUS ONCE
Status: COMPLETED | OUTPATIENT
Start: 2022-09-08 | End: 2022-09-08

## 2022-09-08 RX ORDER — PROPOFOL 10 MG/ML
INJECTION, EMULSION INTRAVENOUS
Status: COMPLETED
Start: 2022-09-08 | End: 2022-09-08

## 2022-09-08 RX ORDER — POTASSIUM CHLORIDE 1.5 G/1.77G
40 POWDER, FOR SOLUTION ORAL EVERY 4 HOURS
Status: COMPLETED | OUTPATIENT
Start: 2022-09-08 | End: 2022-09-08

## 2022-09-08 RX ORDER — PROPOFOL 10 MG/ML
100 VIAL (ML) INTRAVENOUS ONCE
Status: COMPLETED | OUTPATIENT
Start: 2022-09-08 | End: 2022-09-07

## 2022-09-08 RX ORDER — ROCURONIUM BROMIDE 10 MG/ML
1000 INJECTION, SOLUTION INTRAVENOUS ONCE
Status: COMPLETED | OUTPATIENT
Start: 2022-09-08 | End: 2022-09-07

## 2022-09-08 RX ORDER — MAGNESIUM SULFATE HEPTAHYDRATE 40 MG/ML
4 INJECTION, SOLUTION INTRAVENOUS AS NEEDED
Status: DISCONTINUED | OUTPATIENT
Start: 2022-09-08 | End: 2022-09-14 | Stop reason: HOSPADM

## 2022-09-08 RX ORDER — CHLORHEXIDINE GLUCONATE 0.12 MG/ML
15 RINSE ORAL EVERY 12 HOURS SCHEDULED
Status: DISCONTINUED | OUTPATIENT
Start: 2022-09-08 | End: 2022-09-14 | Stop reason: HOSPADM

## 2022-09-08 RX ORDER — POTASSIUM CHLORIDE 1.5 G/1.77G
40 POWDER, FOR SOLUTION ORAL AS NEEDED
Status: DISCONTINUED | OUTPATIENT
Start: 2022-09-08 | End: 2022-09-14 | Stop reason: HOSPADM

## 2022-09-08 RX ORDER — METRONIDAZOLE 500 MG/100ML
500 INJECTION, SOLUTION INTRAVENOUS EVERY 8 HOURS
Status: DISCONTINUED | OUTPATIENT
Start: 2022-09-08 | End: 2022-09-08

## 2022-09-08 RX ORDER — SODIUM CHLORIDE 9 MG/ML
75 INJECTION, SOLUTION INTRAVENOUS CONTINUOUS
Status: DISCONTINUED | OUTPATIENT
Start: 2022-09-08 | End: 2022-09-14

## 2022-09-08 RX ORDER — ROCURONIUM BROMIDE 10 MG/ML
1000 INJECTION, SOLUTION INTRAVENOUS ONCE
Status: COMPLETED | OUTPATIENT
Start: 2022-09-08 | End: 2022-09-08

## 2022-09-08 RX ORDER — FENTANYL CITRATE/PF 100MCG/2ML
SYRINGE (ML) INTRAVENOUS
Status: DISPENSED | OUTPATIENT
Start: 2022-09-08 | End: 2022-09-11

## 2022-09-08 RX ADMIN — PROPOFOL 20 MCG/KG/MIN: 10 INJECTION, EMULSION INTRAVENOUS at 05:06

## 2022-09-08 RX ADMIN — PROPOFOL 10 MG: 10 INJECTION, EMULSION INTRAVENOUS at 14:11

## 2022-09-08 RX ADMIN — GABAPENTIN 800 MG: 400 CAPSULE ORAL at 20:50

## 2022-09-08 RX ADMIN — PROPOFOL 50 MCG/KG/MIN: 10 INJECTION, EMULSION INTRAVENOUS at 02:10

## 2022-09-08 RX ADMIN — SODIUM CHLORIDE 150 ML/HR: 9 INJECTION, SOLUTION INTRAVENOUS at 18:23

## 2022-09-08 RX ADMIN — CEFEPIME HYDROCHLORIDE 2 G: 2 INJECTION, POWDER, FOR SOLUTION INTRAVENOUS at 09:17

## 2022-09-08 RX ADMIN — Medication 10 ML: at 20:39

## 2022-09-08 RX ADMIN — CHLORHEXIDINE GLUCONATE 15 ML: 1.2 RINSE ORAL at 20:39

## 2022-09-08 RX ADMIN — SODIUM CHLORIDE 125 ML/HR: 9 INJECTION, SOLUTION INTRAVENOUS at 11:22

## 2022-09-08 RX ADMIN — LORAZEPAM 0.5 MG/HR: 2 INJECTION INTRAMUSCULAR; INTRAVENOUS at 14:51

## 2022-09-08 RX ADMIN — HEPARIN SODIUM 5000 UNITS: 5000 INJECTION INTRAVENOUS; SUBCUTANEOUS at 21:33

## 2022-09-08 RX ADMIN — PROPOFOL 60 MCG/KG/MIN: 10 INJECTION, EMULSION INTRAVENOUS at 20:46

## 2022-09-08 RX ADMIN — CEFEPIME HYDROCHLORIDE 2 G: 2 INJECTION, POWDER, FOR SOLUTION INTRAVENOUS at 02:16

## 2022-09-08 RX ADMIN — ALPRAZOLAM 2 MG: 1 TABLET ORAL at 21:34

## 2022-09-08 RX ADMIN — Medication 10 ML: at 10:15

## 2022-09-08 RX ADMIN — SODIUM CHLORIDE 1000 ML: 9 INJECTION, SOLUTION INTRAVENOUS at 00:05

## 2022-09-08 RX ADMIN — SODIUM CHLORIDE 1000 ML: 9 INJECTION, SOLUTION INTRAVENOUS at 04:55

## 2022-09-08 RX ADMIN — Medication: at 10:16

## 2022-09-08 RX ADMIN — LORAZEPAM 4 MG: 2 INJECTION INTRAMUSCULAR; INTRAVENOUS at 22:35

## 2022-09-08 RX ADMIN — Medication: at 22:11

## 2022-09-08 RX ADMIN — LORAZEPAM 4 MG: 2 INJECTION INTRAMUSCULAR; INTRAVENOUS at 13:25

## 2022-09-08 RX ADMIN — PIPERACILLIN SODIUM AND TAZOBACTAM SODIUM 3.38 G: 3; .375 INJECTION, POWDER, LYOPHILIZED, FOR SOLUTION INTRAVENOUS at 20:42

## 2022-09-08 RX ADMIN — Medication: at 17:09

## 2022-09-08 RX ADMIN — LEVETIRACETAM 750 MG: 100 INJECTION INTRAVENOUS at 20:51

## 2022-09-08 RX ADMIN — PROPOFOL 60 MCG/KG/MIN: 10 INJECTION, EMULSION INTRAVENOUS at 18:02

## 2022-09-08 RX ADMIN — HEPARIN SODIUM 5000 UNITS: 5000 INJECTION INTRAVENOUS; SUBCUTANEOUS at 14:52

## 2022-09-08 RX ADMIN — ETHYL ALCOHOL 1 EACH: 62 SWAB TOPICAL at 21:33

## 2022-09-08 RX ADMIN — POTASSIUM CHLORIDE 40 MEQ: 1.5 POWDER, FOR SOLUTION ORAL at 18:17

## 2022-09-08 RX ADMIN — LORAZEPAM 4 MG: 2 INJECTION INTRAMUSCULAR; INTRAVENOUS at 20:03

## 2022-09-08 RX ADMIN — LEVETIRACETAM 1500 MG: 15 INJECTION INTRAVENOUS at 02:25

## 2022-09-08 RX ADMIN — PROPOFOL 60 MCG/KG/MIN: 10 INJECTION, EMULSION INTRAVENOUS at 23:39

## 2022-09-08 RX ADMIN — Medication 10 MG/HR: at 17:09

## 2022-09-08 RX ADMIN — PROPOFOL 60 MCG/KG/MIN: 10 INJECTION, EMULSION INTRAVENOUS at 15:22

## 2022-09-08 RX ADMIN — METRONIDAZOLE 500 MG: 500 INJECTION, SOLUTION INTRAVENOUS at 06:38

## 2022-09-08 RX ADMIN — GLYCOPYRROLATE 0.4 MG: 0.2 INJECTION, SOLUTION INTRAMUSCULAR; INTRAVENOUS at 07:49

## 2022-09-08 RX ADMIN — FAMOTIDINE 20 MG: 10 INJECTION, SOLUTION INTRAVENOUS at 11:24

## 2022-09-08 RX ADMIN — LEVETIRACETAM 750 MG: 100 INJECTION INTRAVENOUS at 11:24

## 2022-09-08 RX ADMIN — GABAPENTIN 800 MG: 400 CAPSULE ORAL at 15:01

## 2022-09-08 RX ADMIN — FAMOTIDINE 20 MG: 10 INJECTION, SOLUTION INTRAVENOUS at 20:40

## 2022-09-08 RX ADMIN — PROPOFOL 20 MCG/KG/MIN: 10 INJECTION, EMULSION INTRAVENOUS at 05:10

## 2022-09-08 RX ADMIN — ROCURONIUM BROMIDE 100 MG: 10 SOLUTION INTRAVENOUS at 06:22

## 2022-09-08 RX ADMIN — PROPOFOL 50 MCG/KG/MIN: 10 INJECTION, EMULSION INTRAVENOUS at 02:15

## 2022-09-08 RX ADMIN — PROPOFOL 50 MCG/KG/MIN: 10 INJECTION, EMULSION INTRAVENOUS at 13:07

## 2022-09-08 RX ADMIN — PIPERACILLIN SODIUM AND TAZOBACTAM SODIUM 3.38 G: 3; .375 INJECTION, POWDER, LYOPHILIZED, FOR SOLUTION INTRAVENOUS at 14:51

## 2022-09-08 RX ADMIN — Medication 10 MG/HR: at 06:12

## 2022-09-08 RX ADMIN — POTASSIUM CHLORIDE 40 MEQ: 1.5 POWDER, FOR SOLUTION ORAL at 14:52

## 2022-09-08 NOTE — H&P
[facility] Medicine Services  History & Physical    Patient Identification:  Name:  Lillian Ayon  Age:  36 y.o.  Sex:  female  :  1985  MRN:  [id number]   Visit Number:  [id number]  Admit Date: 2022   Primary Care Physician:  Jason Mathew MD    Subjective     Chief complaint: Concern for seizure-like activity    History of presenting illness:      Lillian Ayon is a 36 y.o. female with past medical history significant for anxiety, arthritis, fibromyalgia, chronic hepatitis C, illicit substance abuse, mild tricuspid valve regurgitation, and bradycardia.  Per ED report, patient [facility] She had been incarcerated yesterday on unknown charges.  She was observed to have seizure-like activity while at the group home. Has not reportedly experienced any seizure activity after arriving to this facility. She was sedated and intubated in the ED after becoming lethargic (was initially combative, aggressive, and acutely intoxicated with unknown substance, per provider note). In ED, a bag of pills was found in patient's bra. Confirmed to be 800 mg gabapentin. Felt that patient's condition is likely the result of gabapentin overdose. However, urine drug screen was positive for amphetamines, methamphetamines, benzos, and methadone. Patient now has right femoral, triple lumen CVC after issues with central line placement in right and left IJ. Chest xray confirming no pneumothorax. Initially on 100 % FiO2; repeat ABG improved and patient has been weaned down to 50% currently. Due to acuity of condition, unable to obtain HPI or information surrounding the events leading up to presentation. Guard initially at bedside; states that when he arrived on shift at 7:00 p.m. yesterday evening, he was told he would be accompanying patient to [facility] due to witnessed seizure activity. Neurology consult has been placed for further  evaluation. EEG pending. Given 1,500 mg Keppra in ED; plans to continue Keppra 750 mg BID per attending. Please see assessment and plan below for further details.     Upon arrival to the ED, vital signs were temperature 99, pulse 108, RR 24, blood pressure 139/61, SpO2 saturation 99% on room air.  Initial ABG with pH 7.345, PCO2 38.2, PO2 99.0, O2 saturation 97.6% on ventilator with 100% FiO2.  Chest x-ray revealing ETT and OGT in good position; shallow lung expansion; infiltrate and/or atelectasis in the lung bases.  CK 1775.  Troponin T negative.  CMP with glucose 173, potassium 3.3, AST 47, otherwise unremarkable.  TSH 0.492.  Ammonia, lactate, mag, procalcitonin all within normal limits.  CBC with WBCs 22.57, hemoglobin 11.5, otherwise unremarkable.  Acetaminophen and salicylate levels negative.  Ethanol negative.  hCG qualitative negative.  Peripheral blood cultures x2 pending.  COVID-19 and flu A/B swab negative.  Urine tox positive for amphetamines, benzodiazepines, methamphetamine, and methadone.  CT head without contrast negative.  CT abdomen pelvis without contrast with no evidence of acute trauma in the abdomen or pelvis; evidence of constipation; previous cholecystectomy.  CT chest without contrast with apparent tube and line positioning as above; bilateral infiltrates in the lower lobes and lingula, likely result of aspiration.  No pneumothorax.    Known Emergency Department medications received prior to my evaluation included 2g IV Cefepime, 50 mg IV Benadryl, 100 mg IV succinylcholine, 20 mg IV etomidate, 0.4 mg IV glycopyrrolate, 10 mg IM haloperidol, 1,500 mg IV levetiracetam (Keppra), 2 mg IV ativan, 500 mg IV flagyl, 2 mg IV versed, 100 mg IV propofol, 100 mg IV rocuronium, and 2 L normal saline bolus. Room location at the time of my evaluation was CCU-3. Discussed with attending physician, Freddie Plata MD.       ---------------------------------------------------------------------------------------------------------------------   Review of Systems   Unable to perform ROS: Acuity of condition      ---------------------------------------------------------------------------------------------------------------------   Past Medical History:   Diagnosis Date   • Anxiety    • Arthritis    • Bradycardia    • Fibromyalgia    • Heart disease    • Hepatitis C    • Myalgia      Past Surgical History:   Procedure Laterality Date   • CHOLECYSTECTOMY     • NERVE SURGERY Right      History reviewed. No pertinent family history.  Social History     Socioeconomic History   • Marital status: Single   Tobacco Use   • Smoking status: Current Every Day Smoker     Packs/day: 0.50     Years: 15.00     Pack years: 7.50     Types: Cigarettes   • Smokeless tobacco: Never Used   Substance and Sexual Activity   • Alcohol use: No   • Drug use: No   • Sexual activity: Defer     ---------------------------------------------------------------------------------------------------------------------   Allergies:  Adhesive tape, Bactrim [sulfamethoxazole-trimethoprim], Codeine, and Keflex [cephalexin]  ---------------------------------------------------------------------------------------------------------------------   Home medications:    Medications below are reported home medications pulling from within the system; at this time, these medications have not been reconciled unless otherwise specified and are in the verification process for further verifcation as current home medications.  (Not in a hospital admission)      Hospital Scheduled Meds:  cefepime, 2 g, Intravenous, Once  cefepime, 2 g, Intravenous, Q8H  metroNIDAZOLE, 500 mg, Intravenous, Q8H      dexmedetomidine, 0.2-1.5 mcg/kg/hr, Last Rate: Stopped (09/08/22 0745)  Midazolam 1 mg/mL 100mL NS, 1-10 mg/hr, Last Rate: 10 mg/hr (09/08/22 0850)  propofol, 5-50 mcg/kg/min, Last Rate: 5 mcg/kg/min  (09/08/22 0851)        Current listed hospital scheduled medications may not yet reflect those currently placed in orders that are signed and held awaiting patient's arrival to floor.   ---------------------------------------------------------------------------------------------------------------------     Objective     Vital Signs:  Temp:  [99 °F (37.2 °C)] 99 °F (37.2 °C)  Heart Rate:  [] 84  Resp:  [24-43] 25  BP: ()/(27-91) 101/64  FiO2 (%):  [50 %-100 %] 50 %      09/07/22 2116   Weight: 99.8 kg (220 lb)     Body mass index is 35.53 kg/m².  ---------------------------------------------------------------------------------------------------------------------       Physical Exam  Vitals and nursing note reviewed.   Constitutional:       Appearance: She is obese. She is ill-appearing and diaphoretic.      Interventions: She is sedated and intubated.   HENT:      Mouth/Throat:      Mouth: Mucous membranes are moist.      Pharynx: Oropharynx is clear.   Eyes:      Pupils: Pupils are equal, round, and reactive to light.   Cardiovascular:      Rate and Rhythm: Normal rate and regular rhythm.      Pulses: Normal pulses.           Radial pulses are 2+ on the right side and 2+ on the left side.        Dorsalis pedis pulses are 2+ on the right side and 2+ on the left side.      Heart sounds: Normal heart sounds. No murmur heard.    No friction rub. No gallop.   Pulmonary:      Effort: She is intubated.      Breath sounds: Examination of the right-lower field reveals decreased breath sounds. Examination of the left-lower field reveals decreased breath sounds. Decreased breath sounds present. No wheezing or rhonchi.   Abdominal:      General: There is no distension.      Palpations: Abdomen is soft.      Tenderness: There is no abdominal tenderness. There is no guarding.   Genitourinary:     Comments: Ibarra catheter in place draining cloudy, yellow urine.   Musculoskeletal:      Cervical back: No rigidity.       Right lower leg: No edema.      Left lower leg: No edema.   Skin:     General: Skin is warm.      Capillary Refill: Capillary refill takes less than 2 seconds.      Findings: Bruising present.   Neurological:      Comments: Generalized myoclonic jerking motions observed on exam; Patient is following commands (squeezed both hands when prompted).        ---------------------------------------------------------------------------------------------------------------------  EKG:  Pending cardiology interpretation; per my review, appears normal sinus rhythm 90's with nonspecific T wave abnormality in inferior and anterolateral leads. No definitive evidence of acute ischemia. QTc prolonged at 505 ms.         Telemetry:  Appearing normal sinus rhythm 80's on exam.   I have personally looked at both the EKG and the telemetry strips.  ---------------------------------------------------------------------------------------------------------------------   Results from last 7 days   Lab Units 09/08/22  0003   LACTATE mmol/L 1.8   WBC 10*3/mm3 22.57*   HEMOGLOBIN g/dL 11.5*   HEMATOCRIT % 35.8   MCV fL 86.5   MCHC g/dL 32.1   PLATELETS 10*3/mm3 266   INR  1.02     Results from last 7 days   Lab Units 09/08/22  0204 09/08/22  0202 09/07/22  2327   PH, ARTERIAL pH units 7.329* 7.301* 7.345*   PO2 ART mm Hg 172.0* 66.1* 99.0   PCO2, ARTERIAL mm Hg 42.8 47.1* 38.2   HCO3 ART mmol/L 22.5 23.2 20.9     Results from last 7 days   Lab Units 09/08/22  0003   SODIUM mmol/L 139   POTASSIUM mmol/L 3.3*   MAGNESIUM mg/dL 2.2   CHLORIDE mmol/L 104   CO2 mmol/L 22.1   BUN mg/dL 8   CREATININE mg/dL 0.71   CALCIUM mg/dL 8.9   GLUCOSE mg/dL 173*   ALBUMIN g/dL 4.31   BILIRUBIN mg/dL 0.4   ALK PHOS U/L 91   AST (SGOT) U/L 47*   ALT (SGPT) U/L 26   Estimated Creatinine Clearance: 133.1 mL/min (by C-G formula based on SCr of 0.71 mg/dL).  Ammonia   Date Value Ref Range Status   09/08/2022 25 11 - 51 umol/L Final     Results from last 7 days   Lab  Units 09/08/22  0003   CK TOTAL U/L 1,775*   TROPONIN T ng/mL <0.010         No results found for: HGBA1C  Lab Results   Component Value Date    TSH 0.492 09/08/2022     Lab Results   Component Value Date    PREGTESTUR Negative 09/08/2022     Pain Management Panel     Pain Management Panel Latest Ref Rng & Units 9/8/2022 5/25/2021    AMPHETAMINES SCREEN, URINE Negative Positive(A) Positive(A)    BARBITURATES SCREEN Negative Negative Negative    BENZODIAZEPINE SCREEN, URINE Negative Positive(A) Negative    BUPRENORPHINEUR Negative Negative Negative    COCAINE SCREEN, URINE Negative Negative Negative    METHADONE SCREEN, URINE Negative Positive(A) Negative    METHAMPHETAMINEUR Negative Positive(A) -            ---------------------------------------------------------------------------------------------------------------------  Imaging Results (Last 7 Days)     Procedure Component Value Units Date/Time    CT Abdomen Pelvis Without Contrast [[id number]] Collected: 09/08/22 0510     Updated: 09/08/22 0512    Narrative:      CT Abdomen Pelvis WO    INDICATION:   Abdominal and thoracic bruising. Overdose. Seizure like activity. Positive drug screen.    TECHNIQUE:   CT of the abdomen and pelvis without IV contrast. Coronal and sagittal reconstructions were obtained.  Radiation dose reduction techniques included automated exposure control or exposure modulation based on body size. Count of known CT and cardiac nuc  med studies performed in previous 12 months: 2.     COMPARISON:   None available.    FINDINGS:  Please see chest CT report dictated separately. There is streak artifact due to the patient's arms. Gallbladder is surgically absent. No biliary obstruction is seen. There is some fatty atrophy of the pancreas. The unenhanced solid abdominal organs are  otherwise grossly normal. No renal or ureteral stones are identified. There is no hydronephrosis. Urinary bladder is decompressed by a Ibarra catheter. Solid pelvic organs  are normal.    NG tube is present in the stomach. The appendix is normal. Moderate stool burden may reflect some constipation. No evidence of colitis. No small bowel obstruction. No adenopathy or free fluid. No acute fractures are seen in the lumbar spine or pelvis.      Impression:        1. Technically limited exam as above.  2. No evidence of acute trauma in the abdomen or pelvis.  3. Evidence of constipation.  4. Cholecystectomy.          Signer Name: Jason Rollins MD   Signed: 9/8/2022 5:10 AM   Workstation Name: [email or web address]    [facility]    CT Chest Without Contrast Diagnostic [[id number]] Collected: 09/08/22 0505     Updated: 09/08/22 0507    Narrative:      CT Chest WO    INDICATION:   Overdose. Aspiration. Seizure-like activity. Positive drug screen. Bruising.    TECHNIQUE:   CT of the thorax without IV contrast. Coronal and sagittal reconstructions were obtained.  Radiation dose reduction techniques included automated exposure control or exposure modulation based on body size. Count of known CT and cardiac nuc med studies  performed in previous 12 months: 2.     COMPARISON:   1/20/2022     FINDINGS:  The endotracheal tube tip is just below the thoracic inlet with the balloon inflated at approximately the level of the thyroid gland. Consider advancing the tube. NG tube tip is in the stomach. There is a left internal jugular vein line in place. The  line is aberrantly positioned in the left internal thoracic vein. It is not in the SVC, and this line should be removed. There is some streak artifact from the patient's arms. No pleural or pericardial effusion is seen. There is no adenopathy. Alveolar  consolidations are noted in the lower lobes and lingula concerning for aspiration. No pneumothorax is identified. No acute fractures. Please see abdomen and pelvis CT report dictated separately.      Impression:        1. Aberrant tube and line positioning as above.  2. Bilateral  infiltrates in the lower lobes and lingula, likely the result of aspiration. No pneumothorax.      Results discussed with Dr. Jareth Mendez at the time of this dictation.    Signer Name: Jason Rollins MD   Signed: 9/8/2022 5:05 AM   Workstation Name: [facility]    CT Head Without Contrast [[id number]] Collected: 09/08/22 0448     Updated: 09/08/22 0450    Narrative:      CT Head WO    HISTORY:   Seizure-like activity. Altered mental status. Positive drug screen.    TECHNIQUE:   Axial unenhanced head CT with multiplanar reformats. Radiation dose reduction techniques included automated exposure control or exposure modulation based on body size. Count of known CT and cardiac nuc med studies performed in previous 12 months: 2.     COMPARISON:   8/23/2018    FINDINGS:   Ventricular size and configuration are normal. No acute infarct or hemorrhage is identified. There are no masses. There is no skull fracture.      Impression:      Normal, negative unenhanced head CT.    Signer Name: Jason Rollins MD   Signed: 9/8/2022 4:48 AM   Workstation Name: [facility]    XR Chest 1 View [[id number]] Collected: 09/08/22 0135     Updated: 09/08/22 0137    Narrative:      CR Chest 1 Vw    INDICATION:   Left IJ line placement.     COMPARISON:    Earlier same day    FINDINGS:  Portable AP view(s) of the chest.  Right IJ line has been removed. Left IJ line has been placed.  The line is directed straight down from the internal jugular vein. On a chest CT of 1/20/2022, the patient did not have a duplicated or left side SVC. Therefore, I cannot exclude arterial line placement. No pneumothorax is seen. The remainder of the exam  is not significantly changed.      Impression:      Left IJ line tip is directed straight down from the neck and projects over the mediastinum. Placement cannot be confirmed within the SVC, and I cannot exclude an arterial line stick.  Correlate with blood gases drawn from the line. Alternatively,  noncontrast chest CT could be obtained to verify placement.    Signer Name: aJson Rollins MD   Signed: 9/8/2022 1:35 AM   Workstation Name: [facility]    [facility]    XR Chest 1 View [[id number]] Collected: 09/08/22 0034     Updated: 09/08/22 0036    Narrative:      CR Chest 1 Vw    INDICATION:   Line placement.     COMPARISON:    9/7/2022 at 2224 hours    FINDINGS:  Portable AP view(s) of the chest.  Endotracheal tube remains in good position in the mid trachea. NG tube tip is in the stomach. There is a new right IJ line in place. The tip extends laterally into the right axillary vein. The tip is not in the SVC. No  pneumothorax. Lung volumes remain low with bibasilar infiltrate or atelectasis, left greater than right. Heart size is normal.      Impression:      Right IJ line tip is directed laterally into the right axillary vein, not into the SVC. No pneumothorax.    Signer Name: Jason Rollins MD   Signed: 9/8/2022 12:34 AM   [facility]    [facility]    XR Chest 1 View [[id number]] Collected: 09/07/22 2332     Updated: 09/07/22 2334    Narrative:      CHEST X-RAY, 9/7/2022      HISTORY:    36-year-old female intubated in the ED.      TECHNIQUE:  AP portable chest x-ray.      FINDINGS:  Endotracheal tube tip in the upper thoracic trachea about 4.4 cm above the idalia. OG tube tip in the mid stomach.    Shallow lung expansion with basilar infiltrate and/or atelectasis.      Impression:      1.  ETT and OGT in good position.  2.  Shallow lung expansion. Infiltrate and/or atelectasis in the lung bases.    Signer Name: Bruno Perrin MD   Signed: 9/7/2022 11:32 PM   [facility]station [facility]    [facility]          Last echocardiogram:  Results for orders placed during the hospital encounter of 07/07/17    Adult Transthoracic Echo  Complete    Interpretation Summary  · . Normal left ventricular cavity size and wall thickness noted. All left ventricular wall segments contract normally.  · Estimated EF appears to be in the range of 56 - 60%.  · The aortic valve is structurally normal. No aortic valve regurgitation is present. No aortic valve stenosis is present.  · The mitral valve is normal in structure. No mitral valve regurgitation is present. No significant mitral valve stenosis is present.  · The tricuspid valve is normal. No tricuspid valve stenosis is present. Mild tricuspid valve regurgitation is present. Estimated right ventricular systolic pressure from tricuspid regurgitation is mildly elevated (35-45 mmHg).  · There is no evidence of pericardial effusion.          I have personally reviewed the above radiology images and read the final radiology report on 09/08/22  ---------------------------------------------------------------------------------------------------------------------  Assessment / Plan     Active Hospital Problems    Diagnosis  POA   • Overdose of undetermined intent, initial encounter [T50.904A]  Yes       ASSESSMENT/PLAN:  -Overdose of unknown substance, POA  -Reported seizure-like activity PTA  -Polysubstance abuse  -Aspiration pneumonia of bilateral lower lobes, POA  -Severe sepsis criteria met on admission with HR > 90, RR > 20, and WBC > 12  -Elevated CK (1,775) POA  -UDS + for amphetamines, methamphetamines, benzos, and methadone.   -Acetaminophen, salicylate, and ethanol levels negative.  -Found bag of pills on patient at time of presentation; appearing to be 800 mg gabapentin.   -Inpatient neurology consult placed; EEG pending. Received loading dose of Keppra; continue maintenance dose.   -CT head unremarkable.  -Ammonia within normal limits.   -Intubated and sedated while in ED; ETT at 24 cm at lip.   -Sedation including fentanyl, midazolam, and propofol at this time; titrate.  -Patient with myoclonic jerking  movements upon exam; is following commands and opens eyes to verbal stimuli.   -Initial ABG in ED with pH 7.345, PO2 99.0 with O2 saturation of 97.6% on vent, 100% FiO2.  -Repeat ABG approximately 3 hours later revealing acidotic pH (7.301) with developing hypercarbia and hypoxia; still on 100% FiO2.  -Most recent ABG improved; pH compensated. FiO2 turned down to 50%.   -Closely monitor with continuous VS/cardiac monitoring/pulse oximetry.   -Inpatient pulmonology consult placed; appreciate their evaluation and input.  -CT chest revealing bilateral infiltrates in the lower lobes and lingula, likely result of aspiration.  -Treat with IV Cefepime and IV Flagyl.   -Obtain sputum culture, MRSA screen.   -Pro-lokesh and lactate normal; WBCs 22.57 upon presentation.   -Peripheral blood cultures pending x2.  -Urine appearing cloudy and a Ibarra catheter drainage bag; add urinalysis with culture if indicated and follow with final results.  -Receiving normal saline at 125 mL an hour; had 2 L normal saline bolus in the ED.  -NPO; OG in place; nutrition consult has also been placed.   -Triple lumen CL in place to right femoral; PICC consult placed for further access while requiring multiple IV medications.   -Repeat CBC, CK in the a.m.    -History of bradycardia  -Mild tricuspid valve regurg  -Hypokalemia, POA  -Patient with noted bradycardia in the 30's after receiving sedation in the ED; Precedex discontinued. Fentanyl initiated. HR has improved.  -Appearing sinus rhythm 80's on my exam.  -Troponin T negative.  -Potassium 3.3; monitor electrolytes and replace per protocol.  -TTE ordered; pending.   -No murmur auscultated on exam.   -Continuous cardiac monitoring in place.     -Chronic hepatitis C  -Mild transaminitis  -AST 47; ALT 26.   -Abdominal imaging obtained this admission with no evidence of liver abnormality.   -Obtain acute hepatitis panel with HIV co-testing.   -Closely monitor; avoid hepatotoxic agents as able.    -Repeat CMP in the a.m.     -Mild, normocytic anemia  -Hgb currently 11.5; appearing to be near baseline.  -Possibly dilutional 2/2 IV fluid bolus.   -Closely monitor; plan to transfuse if Hgb < 7.   -No s/s bleeding on physical exam.    -Anxiety  -Supportive care.  -Currently on continuous sedating medications while mechanically ventilated; Xanax 1 mg Q 8 hours added per attending.     -Arthritis  -Fibromyalgia  -Supportive care.  -Turn Q2 hours, elevate heels, utilize pillows/wedge for positioning.   -Current sedation/analgesics should provide pain relief.     -Ongoing tobacco abuse   -Strongly encourage cessation.   -NRT made available.    -Obesity by BMI, Body mass index is 37.63 kg/m².  -Affecting all aspects of care.  -Encourage lifestyle modifications.        ----------  -DVT prophylaxis: Subcutaneous heparin  -Activity: Bedrest, turn Q2, elevate heels  -Expected length of stay:   INPATIENT status due to the need for care which can only be reasonably provided in an hospital setting such as aggressive/expedited ancillary services and/or consultation services, the necessity for IV medications, close physician monitoring and/or the possible need for procedures.  In such, I feel patient's risk for adverse outcomes and need for care warrant INPATIENT evaluation and predict the patient's care encounter to likely last beyond 2 midnights.  -Disposition pending clinical course.    High risk secondary to overdose of undetermined intent requiring mechanical ventilation.         PEGGY Barnes   09/08/22  09:02 EDT  Pager #[phone]  ---------------------------------------------------------------------------------------------------------------------

## 2022-09-08 NOTE — CONSULTS
Assessment:  Diagnosis: LTAB    Allergies   Allergen Reactions   • Adhesive Tape      Steri Strip Adhesive Allergy    • Bactrim [Sulfamethoxazole-Trimethoprim] Hives, Itching and Swelling   • Codeine Hives, Itching and Swelling   • Keflex [Cephalexin] Hives, Itching and Swelling       Order Date/Time: 09/08/22 1330  Indications: Long Term Antibiotics   LABS:  Lab Results   Component Value Date    INR 1.02 09/08/2022    PROTIME 13.6 09/08/2022     Lab Results   Component Value Date    PTT 25.1 (L) 09/08/2022     Lab Results   Component Value Date    WBC 22.57 (H) 09/08/2022    HGB 11.5 (L) 09/08/2022    HCT 35.8 09/08/2022    MCV 86.5 09/08/2022     09/08/2022     Lab Results   Component Value Date    BUN 8 09/08/2022     Lab Results   Component Value Date    CREATININE 0.71 09/08/2022     Lab Results   Component Value Date    EGFRIFNONA 96 01/19/2022     Labs Reviewed: WNL    Contraindications for PICC/Midline:  No Contraindication      Recommendations:  Peripherally inserted central catheter    Procedure Time Out:  Time out Time: 1330  Correct Patient Identity: Yes  Correct Surgical Side and Site Are Marked: Yes  Agreement on Procedure to be done: Yes  Antibiotic Given: N/A  RN: su montaño RN  RN: Trevor Wells RN  Other: N/A      Su Montaño, JAGRUTI

## 2022-09-08 NOTE — ED PROVIDER NOTES
Subjective   PIT    Patient is a 36-year-old female is brought in from the senior care for seizure-like activity.  On arrival patient is combative, altered and fighting medical staff as well as law enforcement who is present at bedside.  Due to her level of combativeness patient is a severe potential harm to herself as well as others.  Due to her acute inebriation.  She is unable to answer questions or follow commands. Patient is acutely intoxicated with an unknown substance at this time.                 Review of Systems   Unable to perform ROS: Mental status change (Patient is acutely intoxicated and inebriated and cannot answer questions.)       Past Medical History:   Diagnosis Date   • Anxiety    • Arthritis    • Bradycardia    • Fibromyalgia    • Heart disease    • Hepatitis C    • Myalgia        Allergies   Allergen Reactions   • Adhesive Tape      Steri Strip Adhesive Allergy    • Bactrim [Sulfamethoxazole-Trimethoprim] Hives, Itching and Swelling   • Codeine Hives, Itching and Swelling   • Keflex [Cephalexin] Hives, Itching and Swelling       Past Surgical History:   Procedure Laterality Date   • CHOLECYSTECTOMY     • NERVE SURGERY Right        History reviewed. No pertinent family history.    Social History     Socioeconomic History   • Marital status: Single   Tobacco Use   • Smoking status: Current Every Day Smoker     Packs/day: 0.50     Years: 15.00     Pack years: 7.50     Types: Cigarettes   • Smokeless tobacco: Never Used   Substance and Sexual Activity   • Alcohol use: No   • Drug use: No   • Sexual activity: Defer           Objective   Physical Exam    Central Line At Bedside    Date/Time: 9/8/2022 8:11 AM  Performed by: Josr Avila DO  Authorized by: Josr Avila DO     Consent:     Consent obtained:  Emergent situation    Consent given by:  Healthcare agent    Risks, benefits, and alternatives were discussed: not applicable    Universal protocol:     Patient identity confirmed:  Arm band and  hospital-assigned identification number  Pre-procedure details:     Indication(s): central venous access and insufficient peripheral access      Hand hygiene: Hand hygiene performed prior to insertion      Sterile barrier technique: All elements of maximal sterile technique followed      Skin preparation:  Chlorhexidine    Skin preparation agent: Skin preparation agent completely dried prior to procedure    Sedation:     Sedation type:  Deep  Anesthesia:     Anesthesia method:  None  Procedure details:     Location:  R femoral    Patient position:  Supine    Procedural supplies:  Triple lumen    Catheter size:  7 Fr    Landmarks identified: yes      Ultrasound guidance: no      Number of attempts:  1    Successful placement: yes    Post-procedure details:     Post-procedure:  Dressing applied and line sutured    Assessment:  Blood return through all ports    Procedure completion:  Tolerated well, no immediate complications               ED Course  ED Course as of 09/08/22 0812   Thu Sep 08, 2022   0014 Review chest x-ray at bedside showed the patient's central line initially fed into the right IJ and deviated off into the axillary vein.  Lungs are expanded bilaterally with no pneumothorax.    Central line will be removed and replaced  in the left IJ.    [LK]   0016 XR Chest 1 View [LK]   0149 Review of chest xray to confirm left IJ placement  7 Lithuanian with a 20 cm   Left carotid artery was visualized medial to the left internal jugular vein by bedside ultrasound guide prior to left IJ placement.  The tip of the catheter projects slightly superior to the superior vena cava.  Bedside ABGs will be performed with a blood draw from the left catheter line and a peripheral arterial stick confirmed venous placement. [LK]   0151 ER EKG REVIEW  RHYTHM: Sinus rhythm  RATE: 96 bpm  WV: 136 ms  QRS:  70 ms  QTC: 505 ms  AXIS: Normal axis without acute ischemia.    nonspecific ST and T wave repolarization abnormalities. [LK]    0212 ABGs were performed at bedside and for comparison.  Currently the left IJ is and venous placement based on blood pulled from peripheral left IJ compared to a peripheral arterial placement.    The specimen from the left IJ pH is 7.301 PCO2 is 47 PO2 was 66, oxygen saturation of 90.8%    Serial peripheral stick in the left radial artery:  Eight 7.329 PCO2 42.8 PaO2 172, saturation greater than 99%. [LK]   0216 We will also empirically treat patient with Keppra 1500 mg in addition to antibiotics given her history of seizure at time of arrival.    Drug screen was positive for methadone, amphetamines, benzodiazepines and methamphetamine.    Patient refusing ABGs will titrate oxygen saturation down from 100% FiO2.  Patient is currently still pending CT imaging but plan of care will be to admit to ICU. Pt had-off to Dr Mendez  [LK]   0539 I assumed patient's care from Dr. Escalante at the end of her shift, pending CT studies and disposition.  Please see her documentation above.  Left IJ line removed.  Respiratory has advanced endotracheal tube 2 cm, now 24 cm at the lip. [CM]   0626 I discussed the case with Dr. Silva.  She advises that the hospitalist service will admit the patient, but no orders can be placed until the patient has central venous access. [CM]   0716 Case discussed with and care endorsed to Dr. Avila at shift change. [CM]   0810 Patient status required central access for further treatment.  Sterile technique was used prior to procedure.  The area was cleaned using chlorahexadine.  Sterile field was maintained.  Placement was confirmed with return flow.   Procedure tolerated well by the patient. [SF]   0812 Recommend admission for further work up and treatment.  Hospitalist team consulted and made aware of the patient.  Consults and orders placed per hospitalist request.  Patient was agreeable to admission plan.  Vitals stable on admission. [SF]      ED Course User Index  [CM] Jareth Mendez,  MD  [LK] Damaris Escalante DO  [SF] Josr Avila,                                            MDM    Final diagnoses:   Overdose of undetermined intent, initial encounter   Seizures (HCC)       ED Disposition  ED Disposition     ED Disposition   Decision to Admit    Condition   --    Comment   --             No follow-up provider specified.       Medication List      No changes were made to your prescriptions during this visit.          Josr Avila DO  09/08/22 0812

## 2022-09-08 NOTE — ED NOTES
Provider at bedside with RRT, pt noted to be having seizure like activity again, pt noted to have a drop in O2, foaming from her mouth, pt noted to be biting lip and clinching teeth.  Pt receiving breaths via BVMm by RRT.  Nurses X2 noted to be attempting IV access with no success.

## 2022-09-08 NOTE — ED NOTES
Called to pts room to check pts central line due to swelling around the site, attempted to aspirate X2 ports with no blood return, provider made aware, meds stopped going in central line and new IV access placed, meds switched to peripheral access sites.

## 2022-09-08 NOTE — ED NOTES
Pt noted to be having seizure like activity at this time, pt noted to be cyanotic around her mouth, foaming from the mouth, having jerking motions and noted to be extremely stiff and noted to have a drop in her O2, provider at bedside, preparing to intubate.

## 2022-09-08 NOTE — CONSULTS
Neurology Consult Note    Patient Name: Lillian Ayon   MRN: [id number]  Age: 36 y.o.  Sex: female  : 1985    Primary Care Physician: Jason Mathew MD  Referring Physician:  Provider, No Known    Chief Complaint/Reason for Consultation: Seizure     Subjective .  HPI: 36-year-old female PMHx significant for anxiety, arthritis, fibromyalgia, hep C, substance abuse, tricuspid valve regurgitation and bradycardia who presented to the ED on 2022 from the local residential she had been incarcerated.  Staff noted seizure activity (per report, whole body jerking), no reports of seizure activity since being at the hospital.  In the ED she was initially combative and aggressive before becoming lethargic and was eventually sedated and intubated for airway protection.  ED did find a bag of pills in the patient's bra which they confirmed to be 800 mg gabapentin.  UDS was positive for amphetamines, methamphetamines, benzodiazepines, and methadone.  She was given an initial dose of Keppra 1500 mg and has Keppra 750 mg scheduled every 12 hours.  She was also given multiple doses of Ativan for agitation.    The patient unable to provide history due to being intubated, history obtained from chart review and nursing staff at CCU bedside.      Review of Systems   Unable to perform ROS: Intubated      Past Medical History:   Diagnosis Date   • Anxiety    • Arthritis    • Bradycardia    • Fibromyalgia    • Heart disease    • Hepatitis C    • Myalgia      Past Surgical History:   Procedure Laterality Date   • CHOLECYSTECTOMY     • NERVE SURGERY Right      History reviewed. No pertinent family history.  Social History     Socioeconomic History   • Marital status: Single   Tobacco Use   • Smoking status: Current Every Day Smoker     Packs/day: 0.50     Years: 15.00     Pack years: 7.50     Types: Cigarettes   • Smokeless tobacco: Never Used   Substance and Sexual Activity   • Alcohol use: No   • Drug use: No   • Sexual  activity: Defer     Allergies   Allergen Reactions   • Adhesive Tape      Steri Strip Adhesive Allergy    • Bactrim [Sulfamethoxazole-Trimethoprim] Hives, Itching and Swelling   • Codeine Hives, Itching and Swelling   • Keflex [Cephalexin] Hives, Itching and Swelling     Prior to Admission medications    Medication Sig Start Date End Date Taking? Authorizing Provider   albuterol sulfate  (90 Base) MCG/ACT inhaler Inhale 2 puffs 4 (Four) Times a Day. 1/13/19   Lola De La Paz PA   ARIPiprazole (ABILIFY) 10 MG tablet  3/13/18   Dany Walker MD   clonazePAM (KlonoPIN) 0.5 MG tablet  3/13/18   Dany Walker MD   diclofenac (VOLTAREN) 75 MG EC tablet Take 1 tablet by mouth 2 (Two) Times a Day. 4/28/22   Cortez Rosenthal II, PA   doxycycline (MONODOX) 100 MG capsule Take 1 capsule by mouth Every 12 (Twelve) Hours. 1/20/22   Jareth Mendez MD   gabapentin (NEURONTIN) 300 MG capsule 2 (Two) Times a Day. 2/28/18   Dany Walker MD   sertraline (ZOLOFT) 50 MG tablet  3/13/18   Dany Walker MD   tiZANidine (ZANAFLEX) 4 MG tablet Take 1 tablet by mouth Every 8 (Eight) Hours As Needed (pain). 4/28/22   Cortez Rosenthal II, PA             Objective     Temp:  [98 °F (36.7 °C)-99 °F (37.2 °C)] 98 °F (36.7 °C)  Heart Rate:  [] 90  Resp:  [24-43] 30  BP: ()/(27-98) 118/98  FiO2 (%):  [40 %-100 %] 40 %  Neurological Exam  Mental Status  Awake and alert. Patient is nonverbal. Follows two-step commands.  Patient is intubated and on mechanical ventilator.  Currently receiving infusion of propofol, Versed, and fentanyl.  Despite these she opens her eyes when spoken to, and attends to the examiner..    Cranial Nerves  CN II: Visual fields full to confrontation.  CN III, IV, VI: Nystagmus present: Few beats of horizontal nystagmus when she first opens her eyes. Normal lids and orbits bilaterally. Pupils equal round and reactive to light bilaterally.    Motor  Normal muscle bulk  throughout. Normal muscle tone. No abnormal involuntary movements.  Patient is globally weak, no focal motor deficit.  She follows two-step commands.  Antigravity in all 4 extremities.    Sensory  Light touch is normal in upper and lower extremities.       Physical Exam  Vitals and nursing note reviewed.   Constitutional:       General: She is awake.      Appearance: She is obese. She is ill-appearing.   HENT:      Head: Normocephalic.      Mouth/Throat:      Comments: Intubated  Eyes:      General: Lids are normal.      Extraocular Movements: Nystagmus present.      Pupils: Pupils are equal, round, and reactive to light.   Cardiovascular:      Rate and Rhythm: Normal rate and regular rhythm.   Pulmonary:      Comments: Intubated and on mechanical ventilator  Skin:     General: Skin is warm and dry.   Neurological:      Mental Status: She is alert.           Hospital Meds:  Scheduled- ALPRAZolam, 1 mg, Oral, Q8H  cefepime, 2 g, Intravenous, Q8H  famotidine, 20 mg, Intravenous, Q12H  gabapentin, 800 mg, Oral, TID  heparin (porcine), 5,000 Units, Subcutaneous, Q8H  levETIRAcetam, 750 mg, Intravenous, Q12H  metroNIDAZOLE, 500 mg, Intravenous, Q8H  potassium chloride, 40 mEq, Oral, Q4H  sodium chloride, 10 mL, Intravenous, Q12H      Infusions- fentaNYL Citrate,   Midazolam 1 mg/mL 100mL NS, 1-10 mg/hr, Last Rate: 6 mg/hr (09/08/22 0955)  propofol, 5-50 mcg/kg/min, Last Rate: 25 mcg/kg/min (09/08/22 0956)  sodium chloride, 125 mL/hr, Last Rate: 125 mL/hr (09/08/22 1122)       PRNs- magnesium sulfate **OR** magnesium sulfate **OR** magnesium sulfate  •  potassium chloride  •  potassium chloride  •  sodium chloride      Results Reviewed:  I have personally reviewed current labs, radiology, and data.    No results found for: HGBA1C  Lab Results   Component Value Date    WBC 22.57 (H) 09/08/2022    HGB 11.5 (L) 09/08/2022    HCT 35.8 09/08/2022    MCV 86.5 09/08/2022     09/08/2022      Lab Results   Component Value  Date    GLUCOSE 173 (H) 09/08/2022    BUN 8 09/08/2022    CREATININE 0.71 09/08/2022    EGFRIFNONA 96 01/19/2022    BCR 11.3 09/08/2022    K 3.3 (L) 09/08/2022    CO2 22.1 09/08/2022    CALCIUM 8.9 09/08/2022    ALBUMIN 4.31 09/08/2022    LABIL2 1.3 (L) 10/24/2015    AST 47 (H) 09/08/2022    ALT 26 09/08/2022      No results found for: CHOL, CHLPL  No results found for: TRIG  No results found for: HDL  No results found for: LDL, LDLDIRECT   Imaging Results (Last 24 Hours)     Procedure Component Value Units Date/Time    CT Abdomen Pelvis Without Contrast [[id number]] Collected: 09/08/22 0510     Updated: 09/08/22 0512    Narrative:      CT Abdomen Pelvis WO    INDICATION:   Abdominal and thoracic bruising. Overdose. Seizure like activity. Positive drug screen.    TECHNIQUE:   CT of the abdomen and pelvis without IV contrast. Coronal and sagittal reconstructions were obtained.  Radiation dose reduction techniques included automated exposure control or exposure modulation based on body size. Count of known CT and cardiac nuc  med studies performed in previous 12 months: 2.     COMPARISON:   None available.    FINDINGS:  Please see chest CT report dictated separately. There is streak artifact due to the patient's arms. Gallbladder is surgically absent. No biliary obstruction is seen. There is some fatty atrophy of the pancreas. The unenhanced solid abdominal organs are  otherwise grossly normal. No renal or ureteral stones are identified. There is no hydronephrosis. Urinary bladder is decompressed by a Ibarra catheter. Solid pelvic organs are normal.    NG tube is present in the stomach. The appendix is normal. Moderate stool burden may reflect some constipation. No evidence of colitis. No small bowel obstruction. No adenopathy or free fluid. No acute fractures are seen in the lumbar spine or pelvis.      Impression:        1. Technically limited exam as above.  2. No evidence of acute trauma in the abdomen or  pelvis.  3. Evidence of constipation.  4. Cholecystectomy.          Signer Name: Jason Rollins MD   Signed: 9/8/2022 5:10 AM   Workstation Name: [facility]    CT Chest Without Contrast Diagnostic [[id number]] Collected: 09/08/22 0505     Updated: 09/08/22 0507    Narrative:      CT Chest WO    INDICATION:   Overdose. Aspiration. Seizure-like activity. Positive drug screen. Bruising.    TECHNIQUE:   CT of the thorax without IV contrast. Coronal and sagittal reconstructions were obtained.  Radiation dose reduction techniques included automated exposure control or exposure modulation based on body size. Count of known CT and cardiac nuc med studies  performed in previous 12 months: 2.     COMPARISON:   1/20/2022     FINDINGS:  The endotracheal tube tip is just below the thoracic inlet with the balloon inflated at approximately the level of the thyroid gland. Consider advancing the tube. NG tube tip is in the stomach. There is a left internal jugular vein line in place. The  line is aberrantly positioned in the left internal thoracic vein. It is not in the SVC, and this line should be removed. There is some streak artifact from the patient's arms. No pleural or pericardial effusion is seen. There is no adenopathy. Alveolar  consolidations are noted in the lower lobes and lingula concerning for aspiration. No pneumothorax is identified. No acute fractures. Please see abdomen and pelvis CT report dictated separately.      Impression:        1. Aberrant tube and line positioning as above.  2. Bilateral infiltrates in the lower lobes and lingula, likely the result of aspiration. No pneumothorax.      Results discussed with Dr. Jareth Mendez at the time of this dictation.    Signer Name: Jason Rollins MD   Signed: 9/8/2022 5:05 AM   Workstation Name: [facility]    CT Head Without Contrast [[id number]] Collected: 09/08/22 0448     Updated:  09/08/22 0450    Narrative:      CT Head WO    HISTORY:   Seizure-like activity. Altered mental status. Positive drug screen.    TECHNIQUE:   Axial unenhanced head CT with multiplanar reformats. Radiation dose reduction techniques included automated exposure control or exposure modulation based on body size. Count of known CT and cardiac nuc med studies performed in previous 12 months: 2.     COMPARISON:   8/23/2018    FINDINGS:   Ventricular size and configuration are normal. No acute infarct or hemorrhage is identified. There are no masses. There is no skull fracture.      Impression:      Normal, negative unenhanced head CT.    Signer Name: Jason Rollins MD   Signed: 9/8/2022 4:48 AM   Workstation Name: [facility]    XR Chest 1 View [[id number]] Collected: 09/08/22 0135     Updated: 09/08/22 0137    Narrative:      CR Chest 1 Vw    INDICATION:   Left IJ line placement.     COMPARISON:    Earlier same day    FINDINGS:  Portable AP view(s) of the chest.  Right IJ line has been removed. Left IJ line has been placed.  The line is directed straight down from the internal jugular vein. On a chest CT of 1/20/2022, the patient did not have a duplicated or left side SVC. Therefore, I cannot exclude arterial line placement. No pneumothorax is seen. The remainder of the exam  is not significantly changed.      Impression:      Left IJ line tip is directed straight down from the neck and projects over the mediastinum. Placement cannot be confirmed within the SVC, and I cannot exclude an arterial line stick. Correlate with blood gases drawn from the line. Alternatively,  noncontrast chest CT could be obtained to verify placement.    Signer Name: Jason Rollins MD   Signed: 9/8/2022 1:35 AM   [facility]    [facility]    XR Chest 1 View [[id number]] Collected: 09/08/22 0034     Updated: 09/08/22 0036    Narrative:      CR Chest 1  Vw    INDICATION:   Line placement.     COMPARISON:    9/7/2022 at 2224 hours    FINDINGS:  Portable AP view(s) of the chest.  Endotracheal tube remains in good position in the mid trachea. NG tube tip is in the stomach. There is a new right IJ line in place. The tip extends laterally into the right axillary vein. The tip is not in the SVC. No  pneumothorax. Lung volumes remain low with bibasilar infiltrate or atelectasis, left greater than right. Heart size is normal.      Impression:      Right IJ line tip is directed laterally into the right axillary vein, not into the SVC. No pneumothorax.    Signer Name: Jason Rollins MD   Signed: 9/8/2022 12:34 AM   [facility]    [facility]    XR Chest 1 View [[id number]] Collected: 09/07/22 2332     Updated: 09/07/22 2334    Narrative:      CHEST X-RAY, 9/7/2022      HISTORY:    36-year-old female intubated in the ED.      TECHNIQUE:  AP portable chest x-ray.      FINDINGS:  Endotracheal tube tip in the upper thoracic trachea about 4.4 cm above the idalia. OG tube tip in the mid stomach.    Shallow lung expansion with basilar infiltrate and/or atelectasis.      Impression:      1.  ETT and OGT in good position.  2.  Shallow lung expansion. Infiltrate and/or atelectasis in the lung bases.    Signer Name: Bruno Perrin MD   Signed: 9/7/2022 11:32 PM   Workstation Name: [email or web address]    [facility]          Assessment/Plan:  36-year-old female noted to have seizure activity while incarcerated at local [facility].  UDS positive for multiple substances.  Initially given 1500 mg of IV Keppra and started on Keppra 750 mg IV every 12 hours. Also received multiple doses of Ativan due to agitation.  No reports of seizure activity since being at the hospital. Patient was initially combative and aggressive she then became lethargic and she was intubated for airway protection.  Patient alert despite being on sedation,  following commands, no focal deficits noted.  During work-up patient noted to be septic with aspiration pneumonia bilateral lower lobes.       #Seizure activity, likely related to overdose of unknown substance  #Polysubstance abuse, concern for overdose  #Aspiration pneumonia with sepsis    -CT head did not show any acute changes  -UDS positive for amphetamines, methamphetamines, benzodiazepines, and methadone  -Recommend to continue Keppra 750 mg IV every 12 hours for now  -Would recommend considering alternative to cefepime if available as it is known to decrease seizure threshold    The case was discussed with the patient, nursing staff at bedside, and will discuss with hospitalist team.  We will continue to follow.    Thank you for the consultation, please feel free to call with any questions.    José Gardiner, APRN  September 8, 2022  11:25 EDT    Verbal consent taken.  Patient agreeable to be seen via telemedicine. Dr. Kary HILL present during encounter via telemedicine.    This was an audio and video enabled telemedicine encounter.      Please note that portions of this note were completed with a voice recognition program. Efforts were made to edit dictation, but occasionally words are mistranscribed.

## 2022-09-08 NOTE — PROGRESS NOTES
Nutrition Services    Patient Name:  Lillian Ayon  YOB: 1985  MRN: [id number]  Admit Date:  9/7/2022    Consult received for TF.  Labs and meds noted.  TF ordered Peptamen AF @ 20 ml/hr and increase by 10 ml every 4 hours to a goal rate of 60 ml/hr with 20 ml/hr water flush.      Electronically signed by:  Candace Monreal RD  09/08/22 11:42 EDT

## 2022-09-08 NOTE — ED NOTES
Pt given breaths via BVM at this time by RRT, nurses X2 attempting IV access with no success, provider remains at bedside preparing to intubate.

## 2022-09-08 NOTE — PLAN OF CARE
Goal Outcome Evaluation:  Plan of Care Reviewed With: patient, mother        Progress: no change  Outcome Evaluation: VSS. SR. Patient intubated and sedated on propofol, fentanyl, versed, and now ativan. Admitted to CCU today from ED. Patient extremely anxious and fighting ventilator requiring heavy sedation. Answers questions and follows commands when alert. PICC line placed, will remove femoral CVC. MRSA + alcohol swabs ordered. IV fluids and abx continued. Will continue to monitor.

## 2022-09-08 NOTE — ED PROVIDER NOTES
Subjective   PIT    I assumed patient's care from Dr. Escalante at the end of her shift.  Please see her chart for further details.          Review of Systems    Past Medical History:   Diagnosis Date   • Anxiety    • Arthritis    • Bradycardia    • Fibromyalgia    • Heart disease    • Hepatitis C    • Myalgia        Allergies   Allergen Reactions   • Adhesive Tape      Steri Strip Adhesive Allergy    • Bactrim [Sulfamethoxazole-Trimethoprim] Hives, Itching and Swelling   • Codeine Hives, Itching and Swelling   • Keflex [Cephalexin] Hives, Itching and Swelling       Past Surgical History:   Procedure Laterality Date   • CHOLECYSTECTOMY     • NERVE SURGERY Right        History reviewed. No pertinent family history.    Social History     Socioeconomic History   • Marital status: Single   Tobacco Use   • Smoking status: Current Every Day Smoker     Packs/day: 0.50     Years: 15.00     Pack years: 7.50     Types: Cigarettes   • Smokeless tobacco: Never Used   Vaping Use   • Vaping Use: Unknown   Substance and Sexual Activity   • Alcohol use: No   • Drug use: Yes     Types: Amphetamines, Methamphetamines, Benzodiazepines   • Sexual activity: Defer           Objective   Physical Exam    Central Line At Bedside    Date/Time: 9/8/2022 6:30 AM  Performed by: Jareth Mendez MD  Authorized by: Jareth Mendez MD     Consent:     Consent obtained:  Emergent situation  Universal protocol:     Patient identity confirmed:  Arm band  Pre-procedure details:     Indication(s): central venous access and insufficient peripheral access      Hand hygiene: Hand hygiene performed prior to insertion      Sterile barrier technique: All elements of maximal sterile technique followed      Skin preparation:  Chlorhexidine    Skin preparation agent: Skin preparation agent completely dried prior to procedure    Sedation:     Sedation type:  Deep  Anesthesia:     Anesthesia method:  None  Procedure details:     Location:  R subclavian     Site selection rationale:  Patient has had previous failed attempts in bilateral IJ's    Patient position:  Trendelenburg    Procedural supplies:  Triple lumen    Catheter size:  7 Fr    Number of attempts:  1    Successful placement: no    Comments:      I entered the right subclavian vein without difficulty, aspirated dark red blood.  I was able to pass the guidewire with only minimal resistance.  I passed the dilator without difficulty.  However, the line would not pass beyond approximately the longterm point.  I had to pull the line and the guidewire, there was a kink in the guidewire that was preventing further passage of the line.               ED Course  ED Course as of 09/12/22 0451   Thu Sep 08, 2022   0014 Review chest x-ray at bedside showed the patient's central line initially fed into the right IJ and deviated off into the axillary vein.  Lungs are expanded bilaterally with no pneumothorax.    Central line will be removed and replaced  in the left IJ.    [LK]   0016 XR Chest 1 View [LK]   0149 Review of chest xray to confirm left IJ placement  7 [place] with a 20 cm   Left carotid artery was visualized medial to the left internal jugular vein by bedside ultrasound guide prior to left IJ placement.  The tip of the catheter projects slightly superior to the superior vena cava.  Bedside ABGs will be performed with a blood draw from the left catheter line and a peripheral arterial stick confirmed venous placement. [LK]   0151 ER EKG REVIEW  RHYTHM: Sinus rhythm  RATE: 96 bpm  MA: 136 ms  QRS:  70 ms  QTC: 505 ms  AXIS: Normal axis without acute ischemia.    nonspecific ST and T wave repolarization abnormalities. [LK]   0212 ABGs were performed at bedside and for comparison.  Currently the left IJ is and venous placement based on blood pulled from peripheral left IJ compared to a peripheral arterial placement.    The specimen from the left IJ pH is 7.301 PCO2 is 47 PO2 was 66, oxygen saturation of  90.8%    Serial peripheral stick in the left radial artery:  Eight 7.329 PCO2 42.8 PaO2 172, saturation greater than 99%. [LK]   0216 We will also empirically treat patient with Keppra 1500 mg in addition to antibiotics given her history of seizure at time of arrival.    Drug screen was positive for methadone, amphetamines, benzodiazepines and methamphetamine.    Patient refusing ABGs will titrate oxygen saturation down from 100% FiO2.  Patient is currently still pending CT imaging but plan of care will be to admit to ICU. Pt had-off to Dr Mendez  [LK]   0539 I assumed patient's care from Dr. Escalante at the end of her shift, pending CT studies and disposition.  Please see her documentation above.  Left IJ line removed.  Respiratory has advanced endotracheal tube 2 cm, now 24 cm at the lip. [CM]   0626 I discussed the case with Dr. Silva.  She advises that the hospitalist service will admit the patient, but no orders can be placed until the patient has central venous access. [CM]   0716 Case discussed with and care endorsed to Dr. Avila at shift change. [CM]   0810 Patient status required central access for further treatment.  Sterile technique was used prior to procedure.  The area was cleaned using chlorahexadine.  Sterile field was maintained.  Placement was confirmed with return flow.   Procedure tolerated well by the patient. [SF]   0812 Recommend admission for further work up and treatment.  Hospitalist team consulted and made aware of the patient.  Consults and orders placed per hospitalist request.  Patient was agreeable to admission plan.  Vitals stable on admission. [SF]      ED Course User Index  [CM] Jareth Mendez MD  [LK] Damaris Escalante DO  [SF] Josr Avila DO                                           [facility]    Final diagnoses:   Overdose of undetermined intent, initial encounter   Seizures (HCC)       ED Disposition  ED Disposition     ED Disposition   Decision to Admit    Condition   --     Comment   Level of Care: Critical Care [6]   Diagnosis: Overdose of undetermined intent, initial encounter [1967012]   Certification: I Certify That Inpatient Hospital Services Are Medically Necessary For Greater Than 2 Midnights               Please note that portions of this note were completed with a voice recognition program.            Jareth Mendez MD  09/12/22 0453

## 2022-09-08 NOTE — CONSULTS
Referring Provider: Dr. Nava  Reason for Consultation: ventilator management      Chief complaint  Altered mental status      History of present illness:       Patient is a 36-year-old female with past medical history including anxiety, arthritis, fibromyalgia, hepatitis C, mild tricuspid valve regurgitation,and drug abuse history.  Patient presents to the ED yesterday evening due [place] [facility] [place] [facility] [place] [facility]  On arrival, she was notable for altered mental status and very combative, decision was made for intubation.  There was concern that she had taken an unknown substance, she was later notable for pills stored in her bra.  Confirmed per note to be 800 mg gabapentin tablets.   Should not drink screen positive for amphetamines, methamphetamines, benzos, methadone.  Notable for difficult line placement, required femoral central line.  She was also given loading dose of Keppra and scheduled Keppra.  Other evaluation revealed elevated CK level, mild hypokalemia, mild AST elevation.  Leukocytosis noted at 22, and hemoglobin mildly decreased to 11.5.  CT head was negative.  CT chest notable for bilateral infiltrates of the lower lobes concerning for aspiration.  It is also notable that while in the ED, she had difficulty maintaining sedation and would intermittently pull at the lines/tubes.  She required dose of rocuronium and was also given fluid bolus.  She has been initiated on antibiotic coverage.   She was admitted to transfer to CCU for further care.  She arrived on sedation of propofol, Versed.  She is not fully awake, but notable for intermittent movements of the extremities.  It was also reported that while attempting to sedate with Precedex, she became severely bradycardic in the 30s, and thus has been avoided since that time.        Review Of Systems:    Unable to obtain review of systems due to intubation and sedation.         History  Past Medical History:   Diagnosis Date    Anxiety      Arthritis     Bradycardia     Fibromyalgia     Heart disease     Hepatitis C     Myalgia    ,   Past Surgical History:   Procedure Laterality Date    CHOLECYSTECTOMY      NERVE SURGERY Right    , History reviewed. No pertinent family history.,   Social History     Tobacco Use    Smoking status: Current Every Day Smoker     Packs/day: 0.50     Years: 15.00     Pack years: 7.50     Types: Cigarettes    Smokeless tobacco: Never Used   Vaping Use    Vaping Use: Unknown   Substance Use Topics    Alcohol use: No    Drug use: Yes     Types: Amphetamines, Methamphetamines, Benzodiazepines   ,   Medications Prior to Admission   Medication Sig Dispense Refill Last Dose    albuterol sulfate  (90 Base) MCG/ACT inhaler Inhale 2 puffs 4 (Four) Times a Day. 6.7 g 0     ARIPiprazole (ABILIFY) 10 MG tablet        clonazePAM (KlonoPIN) 0.5 MG tablet        diclofenac (VOLTAREN) 75 MG EC tablet Take 1 tablet by mouth 2 (Two) Times a Day. 30 tablet 0     doxycycline (MONODOX) 100 MG capsule Take 1 capsule by mouth Every 12 (Twelve) Hours. 20 capsule 0     gabapentin (NEURONTIN) 300 MG capsule 2 (Two) Times a Day.       sertraline (ZOLOFT) 50 MG tablet        tiZANidine (ZANAFLEX) 4 MG tablet Take 1 tablet by mouth Every 8 (Eight) Hours As Needed (pain). 30 tablet 0    , Scheduled Meds:  ALPRAZolam, 2 mg, Oral, Q8H  ethyl alcohol, 1 application, Nasal, BID  famotidine, 20 mg, Intravenous, Q12H  gabapentin, 800 mg, Oral, TID  heparin (porcine), 5,000 Units, Subcutaneous, Q8H  levETIRAcetam, 750 mg, Intravenous, Q12H  piperacillin-tazobactam, 3.375 g, Intravenous, Once  piperacillin-tazobactam, 3.375 g, Intravenous, Q8H  potassium chloride, 40 mEq, Oral, Q4H  sodium chloride, 10 mL, Intravenous, Q12H    , Continuous Infusions:  fentaNYL Citrate,   LORazepam, 0.5-10 mg/hr, Last Rate: 0.5 mg/hr (09/08/22 1451)  Midazolam 1 mg/mL 100mL NS, 1-10 mg/hr, Last Rate: 10 mg/hr (09/08/22 1308)  norepinephrine, 0.02-0.3 mcg/kg/min, Last Rate:  Stopped (09/08/22 1401)  Pharmacy to Dose Zosyn,   propofol, 5-50 mcg/kg/min, Last Rate: 50 mcg/kg/min (09/08/22 1307)  sodium chloride, 150 mL/hr, Last Rate: 150 mL/hr (09/08/22 1214)     and Allergies:  Adhesive tape, Bactrim [sulfamethoxazole-trimethoprim], Codeine, and Keflex [cephalexin]    Objective     Vital Signs   Temp:  [98 °F (36.7 °C)-99 °F (37.2 °C)] 98.9 °F (37.2 °C)  Heart Rate:  [] 81  Resp:  [24-45] 45  BP: ()/(27-98) 101/56  FiO2 (%):  [40 %-100 %] 40 %    Physical Exam:               Physical exam limited due to telemedicine  General:   intubated. Sedated.  Drowsy but remains notable for intermittent extremity movements.  No seizure-like activity.  HENT: normocephalic. Atraumatic.  ET tube in place.  Cardiac: normal rate and rhythm noted on telemetry  Lungs:  on ventilator support.  Compliant with current settings.  Musculoskeletal: no significant deformity, joint abnormality  Neurologic: On sedation.  Drowsy but notable for intermittent extremity movements.                  Results Review:    LABS:    Lab Results   Component Value Date    GLUCOSE 173 (H) 09/08/2022    BUN 8 09/08/2022    CREATININE 0.71 09/08/2022    EGFRIFNONA 96 01/19/2022    BCR 11.3 09/08/2022    CO2 22.1 09/08/2022    CALCIUM 8.9 09/08/2022    ALBUMIN 4.31 09/08/2022    LABIL2 1.3 (L) 10/24/2015    AST 47 (H) 09/08/2022    ALT 26 09/08/2022    WBC 22.57 (H) 09/08/2022    HGB 11.5 (L) 09/08/2022    HCT 35.8 09/08/2022    MCV 86.5 09/08/2022     09/08/2022     09/08/2022    K 3.3 (L) 09/08/2022     09/08/2022    ANIONGAP 12.9 09/08/2022       Lab Results   Component Value Date    INR 1.02 09/08/2022    INR 0.96 12/08/2014    INR 0.97 12/07/2014    PROTIME 13.6 09/08/2022    PROTIME 10.6 12/08/2014    PROTIME 10.8 12/07/2014       Results from last 7 days   Lab Units 09/08/22  0003   INR  1.02   APTT seconds 25.1*                     I reviewed the patient's new clinical results.  I reviewed the  patient's new imaging results and agree with the interpretation.      Assessment & Plan       Assessment:   Acute respiratory failure, inability to protect airway related to overdose  Overdose of unknown substance, notable for possession of gabapentin tablets on ED admission  Urine toxicology positive for multiple substances: Methamphetamines, amphetamines, benzodiazepines, methadone  Bilateral lower lobe pneumonia, likely aspiration  Hepatitis C  Mild normocytic anemia  History of anxiety, fibromyalgia  History of drug abuse      Plan:   Sedation: On propofol, Versed infusions  Plan to also initiate fentanyl infusion.   Titrating sedation as tolerated.  Can continue Versed due to concern of recent seizure activity.  On Keppra.  Notable for position of gabapentin tablets during admission.   UDS positive for amphetamine, methamphetamine, benzodiazepines, methadone.  EEG completed, results pending.  Neurology following.  Continue on gabapentin 800 mg scheduled  On Xanax scheduled    Patient was notable for partial awakening later this afternoon.  Not entirely alert, but attempting conversation and frequently repositioning in bed.  Also notable for significant tachycardia but maintaining tidal volumes and saturation appropriately.  Appeared agitated, uncomfortable.  Sedation infusions rates were maximized without improvement.  Avoiding Precedex episode of severe bradycardia with use.  Ordered 4 mg/2mL Ativan PRN every 1 hour for sedation.  Patient was administered the first dose, which did not result in any improvement.  Near the time for the second dose, instead ordered 10 mL propofol IV push.   This adequately achieved sedation and was hemodynamically well-tolerated.  Respiratory rate regulated back into the upper 20s and more comfortable appearance.  Patient was also started on Ativan infusion.     Map goal 65 mmHg or greater.     FiO2 (%):  [40 %-100 %] 40 %  S RR:  [25] 25  PEEP/CPAP (cm H2O):  [5 cm H20] 5 cm  H20  MAP (cm H2O):  [5.8-10] 5.8  Ventilator settings and graphics reviewed.  Recent ABGs reviewed.  Patient was initially compliant with the ventilator earlier this morning, then had an episode of partial awakening and was notable for tachypnea in the 40s.  After sedation medications adjusted/added, respiratory rate improved and ventilator compliance improved.  There was no desaturation event during this time.  Ordered MRSA PCR  Imaging notable for bilateral basilar infiltrates, likely aspiration in the setting of recent seizure event.  COVID-19/influenza testing negative.  On zosyn    GI prophylaxis: Famotidine  Nutrition: tube Feeds    Antibiotics: Zosyn  Concern for aspiration pneumonia.  Ordered MRSA PCR.  Cultures:   Microbiology Results (last 10 days)       Procedure Component Value - Date/Time    MRSA Screen, PCR (Inpatient) - Swab, Nares [[id number]]  (Abnormal) Collected: 09/08/22 1027    Lab Status: Final result Specimen: Swab from Nares Updated: 09/08/22 1314     MRSA PCR MRSA Detected    Narrative:      The negative predictive value of this diagnostic test is high and should only be used to consider de-escalating anti-MRSA therapy. A positive result may indicate colonization with MRSA and must be correlated clinically.    COVID PRE-OP / PRE-PROCEDURE SCREENING ORDER (NO ISOLATION) - Swab, Nasopharynx [[id number]]  (Normal) Collected: 09/08/22 0212    Lab Status: Final result Specimen: Swab from Nasopharynx Updated: 09/08/22 0234    Narrative:      The following orders were created for panel order COVID PRE-OP / PRE-PROCEDURE SCREENING ORDER (NO ISOLATION) - Swab, Nasopharynx.  Procedure                               Abnormality         Status                     ---------                               -----------         ------                     COVID-19 and FLU A/B PCR...[[id number]]  Normal              Final result                 Please view results for these tests on the individual orders.     COVID-19 and FLU A/B PCR - Swab, Nasopharynx [[id number]]  (Normal) Collected: 09/08/22 0212    Lab Status: Final result Specimen: Swab from Nasopharynx Updated: 09/08/22 0234     COVID19 Not Detected     Influenza A PCR Not Detected     Influenza B PCR Not Detected    Narrative:      Fact sheet for providers: https://www.fda.gov/media/836570/download    Fact sheet for patients: https://www.fda.gov/media/850504/download    Test performed by PCR.            DVT prophylaxis:  Heparin subcutaneous    Critically ill with hypoxic respiratory failure and delirium   Adjusted sedative meds and vent settings   Cc time 51 mins   Nga Ramos PA-C  09/08/22  15:13 EDT  Case d/w nurse and team   This service is provided via audio video tele medicine   I am in [place] and patient is in [facility]   I, Kyle Blackburn M.D. attest that the above note accurately reflects the work and decisions made  by me.  Patient was seen and evaluated by Dr. Blackburn, including history of present illness, physical exam, assessment, and treatment plan.  The above note was reviewed and edited by Dr. Blackburn.      Scribed for Dr. Blackburn by Nga Ramos PA-C

## 2022-09-09 ENCOUNTER — APPOINTMENT (OUTPATIENT)
Dept: CARDIOLOGY | Facility: HOSPITAL | Age: 37
End: 2022-09-09

## 2022-09-09 LAB
A-A DO2: 138.2 MMHG (ref 0–300)
ALBUMIN SERPL-MCNC: 2.5 G/DL (ref 3.5–5.2)
ALBUMIN/GLOB SERPL: 0.9 G/DL
ALP SERPL-CCNC: 61 U/L (ref 39–117)
ALT SERPL W P-5'-P-CCNC: <5 U/L (ref 1–33)
ANION GAP SERPL CALCULATED.3IONS-SCNC: 8.4 MMOL/L (ref 5–15)
ARTERIAL PATENCY WRIST A: ABNORMAL
AST SERPL-CCNC: 10 U/L (ref 1–32)
ATMOSPHERIC PRESS: 726 MMHG
BACTERIA BLD CULT: NORMAL
BASE EXCESS BLDA CALC-SCNC: -3.2 MMOL/L (ref 0–2)
BASOPHILS # BLD AUTO: 0.01 10*3/MM3 (ref 0–0.2)
BASOPHILS NFR BLD AUTO: 0.2 % (ref 0–1.5)
BDY SITE: ABNORMAL
BILIRUB SERPL-MCNC: 0.2 MG/DL (ref 0–1.2)
BODY TEMPERATURE: 0 C
BUN SERPL-MCNC: 6 MG/DL (ref 6–20)
BUN/CREAT SERPL: 10.2 (ref 7–25)
CALCIUM SPEC-SCNC: 7.8 MG/DL (ref 8.6–10.5)
CHLORIDE SERPL-SCNC: 108 MMOL/L (ref 98–107)
CK SERPL-CCNC: 7811 U/L (ref 20–180)
CO2 BLDA-SCNC: 22.4 MMOL/L (ref 22–33)
CO2 SERPL-SCNC: 19.6 MMOL/L (ref 22–29)
COHGB MFR BLD: 1.2 % (ref 0–5)
CREAT SERPL-MCNC: 0.59 MG/DL (ref 0.57–1)
DEPRECATED RDW RBC AUTO: 46.1 FL (ref 37–54)
EGFRCR SERPLBLD CKD-EPI 2021: 121.5 ML/MIN/1.73
EOSINOPHIL # BLD AUTO: 0.01 10*3/MM3 (ref 0–0.4)
EOSINOPHIL NFR BLD AUTO: 0.2 % (ref 0.3–6.2)
ERYTHROCYTE [DISTWIDTH] IN BLOOD BY AUTOMATED COUNT: 14 % (ref 12.3–15.4)
GLOBULIN UR ELPH-MCNC: 2.9 GM/DL
GLUCOSE SERPL-MCNC: 84 MG/DL (ref 65–99)
HCO3 BLDA-SCNC: 21.3 MMOL/L (ref 20–26)
HCT VFR BLD AUTO: 26.7 % (ref 34–46.6)
HCT VFR BLD CALC: 27.8 % (ref 38–51)
HGB BLD-MCNC: 8.9 G/DL (ref 12–15.9)
HGB BLDA-MCNC: 9.1 G/DL (ref 13.5–17.5)
IMM GRANULOCYTES # BLD AUTO: 0.02 10*3/MM3 (ref 0–0.05)
IMM GRANULOCYTES NFR BLD AUTO: 0.3 % (ref 0–0.5)
INHALED O2 CONCENTRATION: 40 %
LYMPHOCYTES # BLD AUTO: 1.22 10*3/MM3 (ref 0.7–3.1)
LYMPHOCYTES NFR BLD AUTO: 18.8 % (ref 19.6–45.3)
Lab: ABNORMAL
MCH RBC QN AUTO: 30.4 PG (ref 26.6–33)
MCHC RBC AUTO-ENTMCNC: 33.3 G/DL (ref 31.5–35.7)
MCV RBC AUTO: 91.1 FL (ref 79–97)
METHGB BLD QL: <-0.1 % (ref 0–3)
MODALITY: ABNORMAL
MONOCYTES # BLD AUTO: 0.4 10*3/MM3 (ref 0.1–0.9)
MONOCYTES NFR BLD AUTO: 6.2 % (ref 5–12)
NEUTROPHILS NFR BLD AUTO: 4.82 10*3/MM3 (ref 1.7–7)
NEUTROPHILS NFR BLD AUTO: 74.3 % (ref 42.7–76)
NOTE: ABNORMAL
NRBC BLD AUTO-RTO: 0 /100 WBC (ref 0–0.2)
OXYHGB MFR BLDV: 97.6 % (ref 94–99)
PCO2 BLDA: 35.2 MM HG (ref 35–45)
PCO2 TEMP ADJ BLD: ABNORMAL MM[HG]
PEEP RESPIRATORY: 5 CM[H2O]
PH BLDA: 7.39 PH UNITS (ref 7.35–7.45)
PH, TEMP CORRECTED: ABNORMAL
PLATELET # BLD AUTO: 166 10*3/MM3 (ref 140–450)
PMV BLD AUTO: 8.9 FL (ref 6–12)
PO2 BLDA: 94.7 MM HG (ref 83–108)
PO2 TEMP ADJ BLD: ABNORMAL MM[HG]
POTASSIUM SERPL-SCNC: 3.8 MMOL/L (ref 3.5–5.2)
PROT SERPL-MCNC: 5.4 G/DL (ref 6–8.5)
QT INTERVAL: 400 MS
QTC INTERVAL: 505 MS
RBC # BLD AUTO: 2.93 10*6/MM3 (ref 3.77–5.28)
SAO2 % BLDCOA: 98.5 % (ref 94–99)
SET MECH RESP RATE: 25
SODIUM SERPL-SCNC: 136 MMOL/L (ref 136–145)
VENTILATOR MODE: ABNORMAL
VT ON VENT VENT: 400 ML
WBC NRBC COR # BLD: 6.48 10*3/MM3 (ref 3.4–10.8)

## 2022-09-09 PROCEDURE — 94799 UNLISTED PULMONARY SVC/PX: CPT

## 2022-09-09 PROCEDURE — 25010000002 HEPARIN (PORCINE) PER 1000 UNITS: Performed by: INTERNAL MEDICINE

## 2022-09-09 PROCEDURE — 25010000002 LEVETRIRACETAM PER 10 MG: Performed by: INTERNAL MEDICINE

## 2022-09-09 PROCEDURE — 36600 WITHDRAWAL OF ARTERIAL BLOOD: CPT

## 2022-09-09 PROCEDURE — 25010000002 PIPERACILLIN SOD-TAZOBACTAM PER 1 G: Performed by: INTERNAL MEDICINE

## 2022-09-09 PROCEDURE — 83050 HGB METHEMOGLOBIN QUAN: CPT

## 2022-09-09 PROCEDURE — 82375 ASSAY CARBOXYHB QUANT: CPT

## 2022-09-09 PROCEDURE — 94761 N-INVAS EAR/PLS OXIMETRY MLT: CPT

## 2022-09-09 PROCEDURE — 80053 COMPREHEN METABOLIC PANEL: CPT | Performed by: INTERNAL MEDICINE

## 2022-09-09 PROCEDURE — 99291 CRITICAL CARE FIRST HOUR: CPT | Performed by: INTERNAL MEDICINE

## 2022-09-09 PROCEDURE — 93306 TTE W/DOPPLER COMPLETE: CPT

## 2022-09-09 PROCEDURE — 94003 VENT MGMT INPAT SUBQ DAY: CPT

## 2022-09-09 PROCEDURE — 99232 SBSQ HOSP IP/OBS MODERATE 35: CPT | Performed by: NURSE PRACTITIONER

## 2022-09-09 PROCEDURE — 25010000002 PROPOFOL 10 MG/ML EMULSION: Performed by: INTERNAL MEDICINE

## 2022-09-09 PROCEDURE — 82805 BLOOD GASES W/O2 SATURATION: CPT

## 2022-09-09 PROCEDURE — 93306 TTE W/DOPPLER COMPLETE: CPT | Performed by: INTERNAL MEDICINE

## 2022-09-09 PROCEDURE — 82550 ASSAY OF CK (CPK): CPT

## 2022-09-09 PROCEDURE — 85025 COMPLETE CBC W/AUTO DIFF WBC: CPT | Performed by: INTERNAL MEDICINE

## 2022-09-09 RX ORDER — METHADONE HYDROCHLORIDE 10 MG/1
125 TABLET ORAL DAILY
COMMUNITY

## 2022-09-09 RX ADMIN — ALPRAZOLAM 2 MG: 1 TABLET ORAL at 21:23

## 2022-09-09 RX ADMIN — ETHYL ALCOHOL 1 EACH: 62 SWAB TOPICAL at 21:18

## 2022-09-09 RX ADMIN — Medication 10 ML: at 09:01

## 2022-09-09 RX ADMIN — Medication 6 MG/HR: at 18:57

## 2022-09-09 RX ADMIN — SODIUM CHLORIDE 150 ML/HR: 9 INJECTION, SOLUTION INTRAVENOUS at 15:29

## 2022-09-09 RX ADMIN — SODIUM CHLORIDE 150 ML/HR: 9 INJECTION, SOLUTION INTRAVENOUS at 08:01

## 2022-09-09 RX ADMIN — ALPRAZOLAM 2 MG: 1 TABLET ORAL at 05:25

## 2022-09-09 RX ADMIN — Medication 10 ML: at 21:18

## 2022-09-09 RX ADMIN — GABAPENTIN 800 MG: 400 CAPSULE ORAL at 09:01

## 2022-09-09 RX ADMIN — Medication: at 03:16

## 2022-09-09 RX ADMIN — LEVETIRACETAM 750 MG: 100 INJECTION INTRAVENOUS at 09:01

## 2022-09-09 RX ADMIN — SODIUM CHLORIDE 150 ML/HR: 9 INJECTION, SOLUTION INTRAVENOUS at 22:05

## 2022-09-09 RX ADMIN — SODIUM CHLORIDE 500 ML: 9 INJECTION, SOLUTION INTRAVENOUS at 12:25

## 2022-09-09 RX ADMIN — CHLORHEXIDINE GLUCONATE 15 ML: 1.2 RINSE ORAL at 09:01

## 2022-09-09 RX ADMIN — FAMOTIDINE 20 MG: 10 INJECTION, SOLUTION INTRAVENOUS at 09:00

## 2022-09-09 RX ADMIN — ETHYL ALCOHOL 1 EACH: 62 SWAB TOPICAL at 09:01

## 2022-09-09 RX ADMIN — GABAPENTIN 800 MG: 400 CAPSULE ORAL at 17:22

## 2022-09-09 RX ADMIN — Medication: at 18:57

## 2022-09-09 RX ADMIN — Medication 10 MG/HR: at 05:22

## 2022-09-09 RX ADMIN — PROPOFOL 35 MCG/KG/MIN: 10 INJECTION, EMULSION INTRAVENOUS at 19:24

## 2022-09-09 RX ADMIN — SODIUM CHLORIDE 150 ML/HR: 9 INJECTION, SOLUTION INTRAVENOUS at 01:25

## 2022-09-09 RX ADMIN — PIPERACILLIN SODIUM AND TAZOBACTAM SODIUM 3.38 G: 3; .375 INJECTION, POWDER, LYOPHILIZED, FOR SOLUTION INTRAVENOUS at 05:29

## 2022-09-09 RX ADMIN — PROPOFOL 60 MCG/KG/MIN: 10 INJECTION, EMULSION INTRAVENOUS at 08:04

## 2022-09-09 RX ADMIN — PIPERACILLIN SODIUM AND TAZOBACTAM SODIUM 3.38 G: 3; .375 INJECTION, POWDER, LYOPHILIZED, FOR SOLUTION INTRAVENOUS at 11:07

## 2022-09-09 RX ADMIN — PROPOFOL 60 MCG/KG/MIN: 10 INJECTION, EMULSION INTRAVENOUS at 11:01

## 2022-09-09 RX ADMIN — ALPRAZOLAM 2 MG: 1 TABLET ORAL at 13:10

## 2022-09-09 RX ADMIN — FAMOTIDINE 20 MG: 10 INJECTION, SOLUTION INTRAVENOUS at 21:20

## 2022-09-09 RX ADMIN — CHLORHEXIDINE GLUCONATE 15 ML: 1.2 RINSE ORAL at 21:18

## 2022-09-09 RX ADMIN — HEPARIN SODIUM 5000 UNITS: 5000 INJECTION INTRAVENOUS; SUBCUTANEOUS at 13:10

## 2022-09-09 RX ADMIN — GABAPENTIN 800 MG: 400 CAPSULE ORAL at 21:23

## 2022-09-09 RX ADMIN — PROPOFOL 60 MCG/KG/MIN: 10 INJECTION, EMULSION INTRAVENOUS at 04:55

## 2022-09-09 RX ADMIN — PROPOFOL 60 MCG/KG/MIN: 10 INJECTION, EMULSION INTRAVENOUS at 01:55

## 2022-09-09 RX ADMIN — PIPERACILLIN SODIUM AND TAZOBACTAM SODIUM 3.38 G: 3; .375 INJECTION, POWDER, LYOPHILIZED, FOR SOLUTION INTRAVENOUS at 21:16

## 2022-09-09 RX ADMIN — HEPARIN SODIUM 5000 UNITS: 5000 INJECTION INTRAVENOUS; SUBCUTANEOUS at 05:28

## 2022-09-09 RX ADMIN — LEVETIRACETAM 750 MG: 100 INJECTION INTRAVENOUS at 21:23

## 2022-09-09 RX ADMIN — Medication: at 13:36

## 2022-09-09 RX ADMIN — Medication: at 08:22

## 2022-09-09 RX ADMIN — HEPARIN SODIUM 5000 UNITS: 5000 INJECTION INTRAVENOUS; SUBCUTANEOUS at 21:21

## 2022-09-09 RX ADMIN — PROPOFOL 45 MCG/KG/MIN: 10 INJECTION, EMULSION INTRAVENOUS at 14:05

## 2022-09-09 NOTE — PROGRESS NOTES
" LOS: 1 day     Chief Complaint:  Pulmonology is following for ventilator management    Subjective     Interval History:     Patient ramy on mechanical ventilator support.  Notable for initial difficulty with maintaining appropriate sedation yesterday, but was finally achieved.  Able to maintain appropriate sedation overnight.  Continues with minimal ventilator requirements.      Review of Systems:     Unable to obtain review of systems due to intubation and sedation.       Objective     Vital Signs  /57   Pulse 80   Temp 98.7 °F (37.1 °C) (Oral)   Resp 25   Ht 167.6 cm (65.98\")   Wt 106 kg (233 lb 0.4 oz)   LMP  (LMP Unknown)   SpO2 100%   BMI 37.63 kg/m²      Physical Exam:  Physical exam limited due to telemedicine  General:   intubated. Sedated.   HENT: normocephalic. Atraumatic.   Cardiac: normal rate and rhythm noted on telemetry  Lungs:  on ventilator support. compliant with current settings.   Musculoskeletal: no significant deformity, joint abnormality  Neurologic: sedated                  Results Review:   I reviewed the patient's new clinical results.  I reviewed the patient's new imaging results and agree with the interpretation.        CBC      CBC 6/26/22 9/8/22 9/9/22   WBC 7.18 22.57 (A) 6.48   RBC 4.06 4.14 2.93 (A)   Hemoglobin 11.6 (A) 11.5 (A) 8.9 (A)   Hematocrit 35.9 35.8 26.7 (A)   MCV 88.4 86.5 91.1   MCH 28.6 27.8 30.4   MCHC 32.3 32.1 33.3   RDW 12.6 13.2 14.0   Platelets 210 266 166   (A) Abnormal value                  CMP      CMP 6/26/22 9/8/22 9/9/22   Glucose 83 173 (A) 84   BUN 12 8 6   Creatinine 0.77 0.71 0.59   Sodium 142 139 136   Potassium 3.4 (A) 3.3 (A) 3.8   Chloride 102 104 108 (A)   Calcium 9.3 8.9 7.8 (A)   Albumin 4.18 4.31 2.50 (A)   Total Bilirubin 0.3 0.4 0.2   Alkaline Phosphatase 76 91 61   AST (SGOT) 26 47 (A) 10   ALT (SGPT) 24 26 <5   (A) Abnormal value         Comments are available for some flowsheets but are not being displayed.         "         Site   Date Value Ref Range Status   09/09/2022 Right Radial  Final     Misael's Test   Date Value Ref Range Status   09/09/2022 N/A  Final     pH, Arterial   Date Value Ref Range Status   09/09/2022 7.392 7.350 - 7.450 pH units Final     pCO2, Arterial   Date Value Ref Range Status   09/09/2022 35.2 35.0 - 45.0 mm Hg Final     pO2, Arterial   Date Value Ref Range Status   09/09/2022 94.7 83.0 - 108.0 mm Hg Final     HCO3, Arterial   Date Value Ref Range Status   09/09/2022 21.3 20.0 - 26.0 mmol/L Final     Base Excess, Arterial   Date Value Ref Range Status   09/09/2022 -3.2 (L) 0.0 - 2.0 mmol/L Final     O2 Saturation, Arterial   Date Value Ref Range Status   09/09/2022 98.5 94.0 - 99.0 % Final     Hemoglobin, Blood Gas   Date Value Ref Range Status   09/09/2022 9.1 (L) 13.5 - 17.5 g/dL Final     Comment:     84 Value below reference range     Hematocrit, Blood Gas   Date Value Ref Range Status   09/09/2022 27.8 (L) 38.0 - 51.0 % Final     Comment:     84 Value below reference range     Oxyhemoglobin   Date Value Ref Range Status   09/09/2022 97.6 94 - 99 % Final     Methemoglobin   Date Value Ref Range Status   09/09/2022 <-0.10 (L) 0.00 - 3.00 % Final     Comment:     94 Value below reportable range < _0.1     Carboxyhemoglobin   Date Value Ref Range Status   09/09/2022 1.2 0 - 5 % Final     CO2 Content   Date Value Ref Range Status   09/09/2022 22.4 22 - 33 mmol/L Final     Barometric Pressure for Blood Gas   Date Value Ref Range Status   09/09/2022 726 mmHg Final     Modality   Date Value Ref Range Status   09/09/2022 Ventilator  Final     FIO2   Date Value Ref Range Status   09/09/2022 40 % Final           Medication Review:   Scheduled Medications:  ALPRAZolam, 2 mg, Oral, Q8H  chlorhexidine, 15 mL, Mouth/Throat, Q12H  ethyl alcohol, 1 application, Nasal, BID  famotidine, 20 mg, Intravenous, Q12H  gabapentin, 800 mg, Oral, TID  heparin (porcine), 5,000 Units, Subcutaneous, Q8H  levETIRAcetam, 750 mg,  Intravenous, Q12H  piperacillin-tazobactam, 3.375 g, Intravenous, Q8H  sodium chloride, 10 mL, Intravenous, Q12H      Continuous infusions:  fentaNYL Citrate,   LORazepam, 0.5-10 mg/hr, Last Rate: Stopped (09/09/22 1435)  Midazolam 1 mg/mL 100mL NS, 1-10 mg/hr, Last Rate: 7 mg/hr (09/09/22 1723)  norepinephrine, 0.02-0.3 mcg/kg/min, Last Rate: Stopped (09/08/22 1401)  propofol, 5-60 mcg/kg/min, Last Rate: 40 mcg/kg/min (09/09/22 1521)  sodium chloride, 150 mL/hr, Last Rate: 150 mL/hr (09/09/22 1529)        Assessment:   Acute respiratory failure, inability to protect airway related to overdose  Overdose of unknown substance, notable for possession of gabapentin tablets on ED admission  Urine toxicology positive for multiple substances: Methamphetamines, amphetamines, benzodiazepines, methadone  Bilateral lower lobe pneumonia, likely aspiration  Hepatitis C  Mild normocytic anemia  History of anxiety, fibromyalgia  History of drug abuse        Plan:   Notable for some difficulty maintaining sedation appropriately yesterday, but was achieved.  Sedation: Propofol, fentanyl, Versed, Ativan infusions.  Precedex avoided due to significant bradycardia with use.  UDS notable for amphetamine, methamphetamine, benzodiazepines, methadone.  Notable for possession of gabapentin tablets on admission.  On Versed due to concern for recent seizure activity, which led to her ER evaluation.  On [facility]  Neurology following.  EEG negative for ongoing seizure activity.  On scheduled Xanax.  On scheduled gabapentin  Agree with plans to lower sedation and attempt SBT/SBT with possible extubation if tolerated.  RN slowly titrated down/off sedation.    Map goal 65 mmHg or greater.     FiO2 (%):  [40 %] 40 %  S RR:  [25] 25  PEEP/CPAP (cm H2O):  [5 cm H20] 5 cm H20  MAP (cm H2O):  [9.2-11] 10  Ventilator settings and graphics reviewed.  Appears compliant with current settings.  Recent ABG reviewed.  Titrating settings as tolerated.  Agree for  SAT with possible SBT trial as tolerated, with plans to gradually lower sedation.  Patient notable for significant combativeness prior to intubation.  MRSA PCR positive.  Imaging notable for bibasilar infiltrates, concern for aspiration in the setting of recent seizure  On IV Zosyn    GI prophylaxis: Famotidine  Nutrition: Tube feeds    Antibiotics: IV Zosyn  Some concern for aspiration due to recent altered mentation/seizure activity prior to admission  Cultures:   Microbiology Results (last 10 days)       Procedure Component Value - Date/Time    MRSA Screen, PCR (Inpatient) - Swab, Nares [[id number]]  (Abnormal) Collected: 09/08/22 1027    Lab Status: Final result Specimen: Swab from Nares Updated: 09/08/22 1314     MRSA PCR MRSA Detected    Narrative:      The negative predictive value of this diagnostic test is high and should only be used to consider de-escalating anti-MRSA therapy. A positive result may indicate colonization with MRSA and must be correlated clinically.    COVID PRE-OP / PRE-PROCEDURE SCREENING ORDER (NO ISOLATION) - Swab, Nasopharynx [[id number]]  (Normal) Collected: 09/08/22 0212    Lab Status: Final result Specimen: Swab from Nasopharynx Updated: 09/08/22 0234    Narrative:      The following orders were created for panel order COVID PRE-OP / PRE-PROCEDURE SCREENING ORDER (NO ISOLATION) - Swab, Nasopharynx.  Procedure                               Abnormality         Status                     ---------                               -----------         ------                     COVID-19 and FLU A/B PCR...[[id number]]  Normal              Final result                 Please view results for these tests on the individual orders.    COVID-19 and FLU A/B PCR - Swab, Nasopharynx [[id number]]  (Normal) Collected: 09/08/22 0212    Lab Status: Final result Specimen: Swab from Nasopharynx Updated: 09/08/22 0234     COVID19 Not Detected     Influenza A PCR Not Detected     Influenza B PCR Not Detected     Narrative:      Fact sheet for providers: https://www.fda.gov/media/578452/download    Fact sheet for patients: https://www.fda.gov/media/504296/download    Test performed by PCR.    Blood Culture - Blood, Blood, Venous Line [[id number]]  (Abnormal) Collected: 09/08/22 0205    Lab Status: Preliminary result Specimen: Blood, Venous Line Updated: 09/09/22 1625     Blood Culture Abnormal Stain     Gram Stain Anaerobic Bottle Gram positive bacilli    Narrative:      Blood culture does not meet the specified criteria for PCR identification.  If pregnant, immunocompromised, or clinical concern for meningitis, call lab to run BCID for Listeria monocytogenes.    Blood Culture - Blood, Blood, Central Line [[id number]]  (Normal) Collected: 09/08/22 0129    Lab Status: Preliminary result Specimen: Blood, Central Line Updated: 09/09/22 0147     Blood Culture No growth at 24 hours          DVT prophylaxis: Heparin subcutaneous        Remains critically ill with hypoxic respiratory failure   Cc time 51 mins   Nga Ramos PA-C  09/09/22  17:49 EDT    Case d/w nurse and team   This service is provided via audio video tele medicine   I am in [place] and patient is in [facility]     Scribed for Dr. Blackburn by Nga Ramos PA-C  IKyle M.D. attest that the above note accurately reflects the work and decisions made  by me.  Patient was seen and evaluated by Dr. Blackburn, including history of present illness, physical exam, assessment, and treatment plan.  The above note was reviewed and edited by Dr. Blackburn.

## 2022-09-09 NOTE — PROGRESS NOTES
[facility] HOSPITALIST PROGRESS NOTE     Patient Identification:  Name:  Lillian Ayon  Age:  36 y.o.  Sex:  female  :  1985  MRN:  [id number]  Visit Number:  [id number]  ROOM: [address]     Primary Care Provider:  Jason Mathew MD    Length of stay in inpatient status:  1    Subjective     Chief Compliant:    Chief Complaint   Patient presents with   • Drug Overdose       History of Presenting Illness:    Patient intubated and sedated. Yesterday evening patient was agitated with tachypnea even with max propofol, versed, fentanyl. Tried increasing propofol with no improvement. Propofol bolus and ativan gtt per pulmonary worked and patient comfortable. Patient was following commands and shook her head yes when asked if she felt like the substances she had done were wearing off. This morning patient appears sedated. Not interactive or tracking with eyes. Not moving her extremities. She was on 10 of versed, 4 of ativan, 60 of propofol and 300 of fentanyl.     I updated patient's mother this AM and answered all questions.     ROS:  Otherwise 10 point ROS negative other than documented above in HPI.     Objective     Current Hospital Meds:ALPRAZolam, 2 mg, Oral, Q8H  chlorhexidine, 15 mL, Mouth/Throat, Q12H  ethyl alcohol, 1 application, Nasal, BID  famotidine, 20 mg, Intravenous, Q12H  gabapentin, 800 mg, Oral, TID  heparin (porcine), 5,000 Units, Subcutaneous, Q8H  levETIRAcetam, 750 mg, Intravenous, Q12H  piperacillin-tazobactam, 3.375 g, Intravenous, Q8H  sodium chloride, 10 mL, Intravenous, Q12H    fentaNYL Citrate,   LORazepam, 0.5-10 mg/hr, Last Rate: 4 mg/hr (22 1625)  Midazolam 1 mg/mL 100mL NS, 1-10 mg/hr, Last Rate: 10 mg/hr (22 0522)  norepinephrine, 0.02-0.3 mcg/kg/min, Last Rate: Stopped (22 1401)  propofol, 5-60 mcg/kg/min, Last Rate: 60 mcg/kg/min (22 0804)  sodium chloride, 150 mL/hr, Last Rate: 150 mL/hr (22 0801)        Current Antimicrobial  Therapy:  Anti-Infectives (From admission, onward)    Ordered     Dose/Rate Route Frequency Start Stop    09/08/22 1216  piperacillin-tazobactam (ZOSYN) IVPB 3.375 g in 100 mL NS VTB        Note to Pharmacy: Pt previously tolerated cefepime. And Zosyn   Ordering Provider: Freddie Nava MD    3.375 g  over 4 Hours Intravenous Every 8 Hours 09/08/22 2000 09/15/22 1959    09/08/22 1216  piperacillin-tazobactam (ZOSYN) IVPB 3.375 g in 100 mL NS VTB        Ordering Provider: Freddie Nava MD    3.375 g  over 30 Minutes Intravenous Once 09/08/22 1315 09/08/22 1521    09/08/22 0824  cefepime (MAXIPIME) 2 g/100 mL 0.9% NS (mbp)        Ordering Provider: Freddie Nava MD    2 g  200 mL/hr over 30 Minutes Intravenous Once 09/08/22 0900 09/08/22 0947    09/08/22 0626  metroNIDAZOLE (FLAGYL) IVPB 500 mg        Ordering Provider: Jareth Mendez MD    500 mg  over 30 Minutes Intravenous Once 09/08/22 0628 09/08/22 0710    09/07/22 2233  cefepime (MAXIPIME) 2 g/100 mL 0.9% NS (mbp)        Ordering Provider: Damaris Escalante DO    2 g  200 mL/hr over 30 Minutes Intravenous Once 09/07/22 2235 09/08/22 0246        Current Diuretic Therapy:  Diuretics (From admission, onward)    None        ----------------------------------------------------------------------------------------------------------------------  Vital Signs:  Temp:  [97.5 °F (36.4 °C)-99 °F (37.2 °C)] 99 °F (37.2 °C)  Heart Rate:  [] 83  Resp:  [24-45] 25  BP: ()/(44-98) 106/55  FiO2 (%):  [40 %] 40 %  SpO2:  [95 %-100 %] 100 %  on   ;   Device (Oxygen Therapy): ventilator  Body mass index is 37.63 kg/m².    Wt Readings from Last 3 Encounters:   09/08/22 106 kg (233 lb 0.4 oz)   06/26/22 99.8 kg (220 lb)   04/28/22 90.7 kg (200 lb)     Intake & Output (last 3 days)       09/06 0701  09/07 0700 09/07 0701  09/08 0700 09/08 0701  09/09 0700 09/09 0701  09/10 0700    I.V. (mL/kg)   5658 (53.4)     IV Piggyback  2100 100     Total Intake(mL/kg)  2100  (58) 6251 (54.3)     Urine (mL/kg/hr)  500 3650 (1.4)     Stool   0     Total Output  500 3650     Net  +1600 +2103                 NPO Diet NPO Type: Strict NPO  ----------------------------------------------------------------------------------------------------------------------  Physical exam:  GENERAL:  Patient intubated and sedated.   SKIN:  Warm, dry without rashes, purpura or petechiae.  EYES:  Conjunctivae normal.   HEAD:  Normocephalic. Atraumatic.   EARS/NOSE/MOUTH/THROAT:  Septum midline.  No excoriations or nasal discharge. No stomatitis or ulcers.  Lips normal. Oropharynx without lesions or exudates.  NECK:  Supple with good range of motion; no thyromegaly or masses  LYMPHATICS:  No cervical, supraclavicular, axillary adenopathy.  RESP:  Lungs clear to auscultation. Good airflow. Intubated receiving mechanical ventilation.   CARDIAC:  Regular rate and rhythm without murmurs, rubs or gallops. Normal S1,S2. No edema  GI:  Soft, nontender, no hepatosplenomegaly, normal bowel sounds  MSK:  No clubbing or cyanosis, No joint swelling noted in hands  NEUROLOGICAL:  No focal deficit. Pupils equal and reactive.   ----------------------------------------------------------------------------------------------------------------------  Tele:    ----------------------------------------------------------------------------------------------------------------------  Results from last 7 days   Lab Units 09/09/22  0042 09/08/22  0003   LACTATE mmol/L  --  1.8   WBC 10*3/mm3 6.48 22.57*   HEMOGLOBIN g/dL 8.9* 11.5*   HEMATOCRIT % 26.7* 35.8   MCV fL 91.1 86.5   MCHC g/dL 33.3 32.1   PLATELETS 10*3/mm3 166 266   INR   --  1.02     Results from last 7 days   Lab Units 09/09/22  0923   PH, ARTERIAL pH units 7.392   PO2 ART mm Hg 94.7   PCO2, ARTERIAL mm Hg 35.2   HCO3 ART mmol/L 21.3     Results from last 7 days   Lab Units 09/09/22  0042 09/08/22  1005 09/08/22  0003   SODIUM mmol/L 136  --  139   POTASSIUM mmol/L 3.8  --   3.3*   MAGNESIUM mg/dL  --  2.2 2.2   CHLORIDE mmol/L 108*  --  104   CO2 mmol/L 19.6*  --  22.1   BUN mg/dL 6  --  8   CREATININE mg/dL 0.59  --  0.71   CALCIUM mg/dL 7.8*  --  8.9   PHOSPHORUS mg/dL  --   --  2.6   GLUCOSE mg/dL 84  --  173*   ALBUMIN g/dL 2.50*  --  4.31   BILIRUBIN mg/dL 0.2  --  0.4   ALK PHOS U/L 61  --  91   AST (SGOT) U/L 10  --  47*   ALT (SGPT) U/L <5  --  26   Estimated Creatinine Clearance: 165.4 mL/min (by C-G formula based on SCr of 0.59 mg/dL).  Ammonia   Date Value Ref Range Status   09/08/2022 25 11 - 51 umol/L Final     Results from last 7 days   Lab Units 09/09/22  0042 09/08/22  1005 09/08/22  0003   CK TOTAL U/L 7,811* 8,122* 1,775*   TROPONIN T ng/mL  --   --  <0.010             Glucose   Date/Time Value Ref Range Status   09/08/2022 0229 143 (H) 70 - 130 mg/dL Final     Comment:     RN Notified Meter: [id number] : [id number] brian johnson   09/07/2022 2258 224 (H) 70 - 130 mg/dL Final     Comment:     RN Notified Meter: [id number] : [id number] brian johnson     Lab Results   Component Value Date    TSH 0.492 09/08/2022     Lab Results   Component Value Date    PREGTESTUR Negative 09/08/2022     Pain Management Panel     Pain Management Panel Latest Ref Rng & Units 9/8/2022 5/25/2021    AMPHETAMINES SCREEN, URINE Negative Positive(A) Positive(A)    BARBITURATES SCREEN Negative Negative Negative    BENZODIAZEPINE SCREEN, URINE Negative Positive(A) Negative    BUPRENORPHINEUR Negative Negative Negative    COCAINE SCREEN, URINE Negative Negative Negative    METHADONE SCREEN, URINE Negative Positive(A) Negative    METHAMPHETAMINEUR Negative Positive(A) -        Brief Urine Lab Results  (Last result in the past 365 days)      Color   Clarity   Blood   Leuk Est   Nitrite   Protein   CREAT   Urine HCG        09/08/22 0112 Yellow   Cloudy   Large (3+)   Negative   Negative   Trace           09/08/22 0112               Negative           Blood Culture   Date Value Ref Range  Status   09/08/2022 No growth at 24 hours  Preliminary   09/08/2022 No growth at 24 hours  Preliminary     No results found for: URINECX  No results found for: WOUNDCX  No results found for: STOOLCX  No results found for: RESPCX  No results found for: AFBCX  Results from last 7 days   Lab Units 09/08/22  0003   PROCALCITONIN ng/mL 0.06   LACTATE mmol/L 1.8       I have personally looked at the labs and they are summarized above.  ----------------------------------------------------------------------------------------------------------------------  Detailed radiology reports for the last 24 hours:    Imaging Results (Last 24 Hours)     Procedure Component Value Units Date/Time    XR Chest 1 View [[id number]] Collected: 09/08/22 1423     Updated: 09/08/22 1426    Narrative:      EXAM:    XR Chest, 1 View     EXAM DATE:    9/8/2022 1:33 PM     CLINICAL HISTORY:    confirm OG placement; T50.904A-Poisoning by unspecified drugs,  medicaments and biological substances, undetermined, initial encounter;  R56.9-Unspecified convulsions     TECHNIQUE:    Frontal view of the chest.     COMPARISON:    09/08/2022     FINDINGS:    Lungs:  Patchy left basilar airspace disease. May represent  atelectasis.  Low lung volumes.    Pleural space:  Unremarkable.  No pneumothorax.    Heart:  Unremarkable.  No cardiomegaly.    Mediastinum:  Unremarkable.    Bones/joints:  Unremarkable.    Tubes, lines and devices:  ET tube with tip at level of clavicles.  NG  tube extends into the stomach.  IJ deep line is been removed.    Upper abdomen:  Partial imaging of abdomen demonstrates generalized  ileus pattern.       Impression:      1.  Developing of left patchy basilar airspace disease which may  represent atelectasis or pneumonia.  2.  Support devices as above.     This report was finalized on 9/8/2022 2:24 PM by Dr. José Bennett MD.           Assessment & Plan    #Acute respiratory failure 2/2 drug overdose   #Aspiration pneumonia  -  Intubated in emergency room for airway protection   - CT chest/abdomen/pelvis/head unrevealing other than bibasilar infiltrates likely from aspiration   - Will transition to zosyn as cefepime can lower seizure threshold. Mild allergy to cephalosporins listed but tolerated cefepime in ED.   - Will follow respiratory culture and blood cultures   - Patient with significant benzo/opiate tolerance requiring higher than usual doses of sedation. Will begin to wean and start with ativan (which has longer half life) and decrease propofol back to normal maximum dose.   - Would consider seroquel but QTC prolonged   - Continue scheduled PO ativan  - Pulmonology following. Appreciate recs.      #Precedex induced bradycardia   - EKG NSR, nonspecific T-wave abnormality   - Echo pending   - Will avoid precedex and monitor on tele      #Polysubstance drug use   - UDS positive for benzodiazepine, methamphetamine, methadone. There is also concern for gabapentin misuse and overdose.      #Elevated CK  - Preserved renal function  - CK: 1775=>8122=>7811  - Will trend until improves further to under 5,000. At that point pigment induced renal injury unlikely.   - Continue 150 cc/hr NS     #Reported seizure and active jerking/monoclonus   - Suspect from gabapentin overdose or possibly benzo withdrawal   - Loaded on keppra in ED. Will continue 750 IV BID keppra for now.   - STAT EEG showed nonspecific moderate degree slowing but no evidence of epileptic activity   - Tele neuro following. Appreciate recs.      #Hx of hepatitis C  - Ammonia level nml. No history of cirrhosis. Do not suspect hepatic encephalopathy at this point   - Family reported they believe it was treated   - Repeat hepatitis panel still positive for hepatitis C Ab. Outpatient viral load may be warranted depending on prior treatments.   - HIV screen negative      #Tobacco use  - Recommend cessation      F: NPO, Tfs ordered  A: Fentanyl   S: Propofol   T: Heparin SubQ  H: HOB  elevated  U: Famotadine   G: PRN  S: Daily  B: PRN  I: ETT 9/8. PICC line 9/8, vlad MIRZA   D: Cefepime/flagyl      Code status: Full. Patient's NOK is her mother, Yessy, [phone].     Dispo: Continue CCU level care     40 minutes of critical care spent on the care of patient. Patient critically ill requiring mechanical ventilation. 50% of time bedside or discussing care with family.     VTE Prophylaxis:   Mechanical Order History:     None      Pharmalogical Order History:      Ordered     Dose Route Frequency Stop    09/08/22 0906  heparin (porcine) 5000 UNIT/ML injection 5,000 Units         5,000 Units SC Every 8 Hours Scheduled --                Freddie Nava MD  [facility]  09/09/22  10:47 EDT

## 2022-09-09 NOTE — PLAN OF CARE
Problem: Adult Inpatient Plan of Care  Goal: Plan of Care Review  Outcome: Ongoing, Progressing  Flowsheets (Taken 9/9/2022 0602)  Plan of Care Reviewed With: patient  Goal: Patient-Specific Goal (Individualized)  Outcome: Ongoing, Progressing  Goal: Absence of Hospital-Acquired Illness or Injury  Outcome: Ongoing, Progressing  Intervention: Identify and Manage Fall Risk  Flowsheets  Taken 9/9/2022 0500  Safety Promotion/Fall Prevention:   safety round/check completed   clutter free environment maintained   fall prevention program maintained  Taken 9/9/2022 0400  Safety Promotion/Fall Prevention:   safety round/check completed   clutter free environment maintained   fall prevention program maintained  Taken 9/9/2022 0300  Safety Promotion/Fall Prevention:   safety round/check completed   clutter free environment maintained   fall prevention program maintained  Taken 9/9/2022 0200  Safety Promotion/Fall Prevention:   safety round/check completed   clutter free environment maintained   fall prevention program maintained  Taken 9/9/2022 0100  Safety Promotion/Fall Prevention:   safety round/check completed   clutter free environment maintained   fall prevention program maintained  Taken 9/9/2022 0000  Safety Promotion/Fall Prevention:   safety round/check completed   clutter free environment maintained   fall prevention program maintained  Taken 9/8/2022 2300  Safety Promotion/Fall Prevention:   safety round/check completed   clutter free environment maintained   fall prevention program maintained  Taken 9/8/2022 2200  Safety Promotion/Fall Prevention:   safety round/check completed   clutter free environment maintained   fall prevention program maintained  Taken 9/8/2022 2100  Safety Promotion/Fall Prevention:   safety round/check completed   clutter free environment maintained   fall prevention program maintained  Taken 9/8/2022 2000  Safety Promotion/Fall Prevention:   safety round/check completed   clutter free  environment maintained   fall prevention program maintained  Taken 9/8/2022 1900  Safety Promotion/Fall Prevention:   safety round/check completed   clutter free environment maintained   fall prevention program maintained  Intervention: Prevent Skin Injury  Flowsheets  Taken 9/9/2022 0400  Body Position:   turned   right   side-lying  Taken 9/9/2022 0205  Skin Protection:   adhesive use limited   pulse oximeter probe site changed   tubing/devices free from skin contact  Taken 9/9/2022 0200  Body Position:   turned   left   side-lying  Taken 9/9/2022 0000  Body Position:   turned   right   side-lying  Taken 9/8/2022 2200  Body Position:   turned   left   side-lying  Taken 9/8/2022 2005  Skin Protection:   adhesive use limited   pulse oximeter probe site changed   tubing/devices free from skin contact  Taken 9/8/2022 2000  Body Position:   turned   right   side-lying  Intervention: Prevent and Manage VTE (Venous Thromboembolism) Risk  Flowsheets  Taken 9/9/2022 0205  Activity Management: bedrest  VTE Prevention/Management: (SQ heparin) other (see comments)  Taken 9/8/2022 2005  Activity Management: bedrest  VTE Prevention/Management: (SQ heparin) other (see comments)  Intervention: Prevent Infection  Flowsheets  Taken 9/9/2022 0400  Infection Prevention:   environmental surveillance performed   hand hygiene promoted   rest/sleep promoted   single patient room provided  Taken 9/9/2022 0200  Infection Prevention:   environmental surveillance performed   hand hygiene promoted   rest/sleep promoted   single patient room provided  Taken 9/9/2022 0000  Infection Prevention:   environmental surveillance performed   hand hygiene promoted   rest/sleep promoted   single patient room provided  Taken 9/8/2022 2200  Infection Prevention:   environmental surveillance performed   hand hygiene promoted   rest/sleep promoted   single patient room provided  Taken 9/8/2022 2000  Infection Prevention:   environmental surveillance  performed   hand hygiene promoted   rest/sleep promoted   single patient room provided  Taken 9/8/2022 1900  Infection Prevention:   environmental surveillance performed   hand hygiene promoted   rest/sleep promoted   single patient room provided  Goal: Optimal Comfort and Wellbeing  Outcome: Ongoing, Progressing  Intervention: Monitor Pain and Promote Comfort  Flowsheets  Taken 9/8/2022 2235  Pain Management Interventions: see MAR  Taken 9/8/2022 2005  Pain Management Interventions: see MAR  Taken 9/8/2022 2003  Pain Management Interventions: see MAR  Intervention: Provide Person-Centered Care  Flowsheets  Taken 9/9/2022 0205  Trust Relationship/Rapport:   care explained   reassurance provided  Taken 9/8/2022 2005  Trust Relationship/Rapport:   care explained   reassurance provided  Goal: Readiness for Transition of Care  Outcome: Ongoing, Progressing     Problem: Skin Injury Risk Increased  Goal: Skin Health and Integrity  Outcome: Ongoing, Progressing  Intervention: Optimize Skin Protection  Flowsheets  Taken 9/9/2022 0400  Head of Bed (HOB) Positioning:   HOB elevated   HOB at 30-45 degrees  Taken 9/9/2022 0205  Pressure Reduction Techniques:   heels elevated off bed   pressure points protected   weight shift assistance provided  Pressure Reduction Devices:   heel offloading device utilized   positioning supports utilized   pressure-redistributing mattress utilized  Skin Protection:   adhesive use limited   pulse oximeter probe site changed   tubing/devices free from skin contact  Taken 9/9/2022 0200  Head of Bed (HOB) Positioning:   HOB elevated   HOB at 30-45 degrees  Taken 9/9/2022 0000  Head of Bed (HOB) Positioning:   HOB elevated   HOB at 30-45 degrees  Taken 9/8/2022 2200  Head of Bed (HOB) Positioning:   HOB elevated   HOB at 30-45 degrees  Taken 9/8/2022 2005  Pressure Reduction Techniques:   heels elevated off bed   pressure points protected   weight shift assistance provided  Pressure Reduction  Devices:   heel offloading device utilized   positioning supports utilized   pressure-redistributing mattress utilized  Skin Protection:   adhesive use limited   pulse oximeter probe site changed   tubing/devices free from skin contact  Taken 9/8/2022 2000  Head of Bed (HOB) Positioning:   HOB elevated   HOB at 30-45 degrees     Problem: Fall Injury Risk  Goal: Absence of Fall and Fall-Related Injury  Outcome: Ongoing, Progressing  Intervention: Identify and Manage Contributors  Flowsheets  Taken 9/9/2022 0400  Medication Review/Management: medications reviewed  Taken 9/9/2022 0200  Medication Review/Management: medications reviewed  Taken 9/9/2022 0000  Medication Review/Management: medications reviewed  Taken 9/8/2022 2200  Medication Review/Management: medications reviewed  Taken 9/8/2022 2000  Medication Review/Management: medications reviewed  Taken 9/8/2022 1900  Medication Review/Management: medications reviewed  Intervention: Promote Injury-Free Environment  Flowsheets  Taken 9/9/2022 0500  Safety Promotion/Fall Prevention:   safety round/check completed   clutter free environment maintained   fall prevention program maintained  Taken 9/9/2022 0400  Safety Promotion/Fall Prevention:   safety round/check completed   clutter free environment maintained   fall prevention program maintained  Taken 9/9/2022 0300  Safety Promotion/Fall Prevention:   safety round/check completed   clutter free environment maintained   fall prevention program maintained  Taken 9/9/2022 0200  Safety Promotion/Fall Prevention:   safety round/check completed   clutter free environment maintained   fall prevention program maintained  Taken 9/9/2022 0100  Safety Promotion/Fall Prevention:   safety round/check completed   clutter free environment maintained   fall prevention program maintained  Taken 9/9/2022 0000  Safety Promotion/Fall Prevention:   safety round/check completed   clutter free environment maintained   fall prevention  program maintained  Taken 9/8/2022 2300  Safety Promotion/Fall Prevention:   safety round/check completed   clutter free environment maintained   fall prevention program maintained  Taken 9/8/2022 2200  Safety Promotion/Fall Prevention:   safety round/check completed   clutter free environment maintained   fall prevention program maintained  Taken 9/8/2022 2100  Safety Promotion/Fall Prevention:   safety round/check completed   clutter free environment maintained   fall prevention program maintained  Taken 9/8/2022 2000  Safety Promotion/Fall Prevention:   safety round/check completed   clutter free environment maintained   fall prevention program maintained  Taken 9/8/2022 1900  Safety Promotion/Fall Prevention:   safety round/check completed   clutter free environment maintained   fall prevention program maintained     Problem: Adjustment to Illness (Overdose)  Goal: Optimal Coping  Outcome: Ongoing, Progressing  Intervention: Support Psychosocial Response to Acute Illness  Flowsheets  Taken 9/9/2022 0205  Supportive Measures: relaxation techniques promoted  Taken 9/8/2022 2005  Supportive Measures: relaxation techniques promoted     Problem: Bleeding (Overdose)  Goal: Absence of Bleeding  Outcome: Ongoing, Progressing  Intervention: Manage Signs of Bleeding  Flowsheets  Taken 9/9/2022 0205  Bleeding Precautions: blood pressure closely monitored  Taken 9/8/2022 2005  Bleeding Precautions: blood pressure closely monitored     Problem: Dysrhythmia (Overdose)  Goal: Stable Heart Rate and Rhythm  Outcome: Ongoing, Progressing     Problem: Fluid Imbalance (Overdose)  Goal: Fluid Balance  Outcome: Ongoing, Progressing  Intervention: Monitor and Manage Fluid and Electrolyte Balance  Flowsheets  Taken 9/9/2022 0205  Fluid/Electrolyte Management: fluids provided  Taken 9/8/2022 2005  Fluid/Electrolyte Management: fluids provided     Problem: Hemodynamic Instability (Overdose)  Goal: Effective Tissue Perfusion  Outcome:  Ongoing, Progressing     Problem: Neurologic Impairment (Overdose)  Goal: Optimal Neurologic Function  Outcome: Ongoing, Progressing  Intervention: Protect and Optimize Cerebral Perfusion  Flowsheets (Taken 9/9/2022 0205)  Cerebral Perfusion Promotion: blood pressure monitored     Problem: Respiratory Compromise (Overdose)  Goal: Effective Oxygenation and Ventilation  Outcome: Ongoing, Progressing  Intervention: Optimize Oxygenation and Ventilation  Flowsheets  Taken 9/9/2022 0205  Airway/Ventilation Management: airway patency maintained  Taken 9/8/2022 2005  Airway/Ventilation Management: airway patency maintained     Problem: Communication Impairment (Mechanical Ventilation, Invasive)  Goal: Effective Communication  Outcome: Ongoing, Progressing  Intervention: Ensure Effective Communication  Flowsheets  Taken 9/9/2022 0205  Communication Enhancement Strategies: healthcare instructions adapted  Taken 9/8/2022 2005  Communication Enhancement Strategies: healthcare instructions adapted     Problem: Device-Related Complication Risk (Mechanical Ventilation, Invasive)  Goal: Optimal Device Function  Outcome: Ongoing, Progressing  Intervention: Optimize Device Care and Function  Flowsheets  Taken 9/9/2022 0400  Airway Safety Measures:   manual resuscitator/mask at bedside   suction at bedside  Taken 9/9/2022 0200  Airway Safety Measures:   manual resuscitator/mask at bedside   suction at bedside  Taken 9/9/2022 0000  Airway Safety Measures:   manual resuscitator/mask at bedside   suction at bedside  Taken 9/8/2022 2200  Airway Safety Measures:   manual resuscitator/mask at bedside   suction at bedside  Taken 9/8/2022 2000  Airway Safety Measures:   manual resuscitator/mask at bedside   suction at bedside  Taken 9/8/2022 1900  Airway Safety Measures:   manual resuscitator/mask at bedside   suction at bedside     Problem: Inability to Wean (Mechanical Ventilation, Invasive)  Goal: Mechanical Ventilation  Liberation  Outcome: Ongoing, Progressing  Intervention: Promote Extubation and Mechanical Ventilation Liberation  Flowsheets  Taken 9/9/2022 0400  Medication Review/Management: medications reviewed  Taken 9/9/2022 0205  Environmental Support: calm environment promoted  Taken 9/9/2022 0200  Medication Review/Management: medications reviewed  Taken 9/9/2022 0000  Medication Review/Management: medications reviewed  Taken 9/8/2022 2200  Medication Review/Management: medications reviewed  Taken 9/8/2022 2005  Environmental Support: calm environment promoted  Taken 9/8/2022 2000  Medication Review/Management: medications reviewed  Taken 9/8/2022 1900  Medication Review/Management: medications reviewed     Problem: Nutrition Impairment (Mechanical Ventilation, Invasive)  Goal: Optimal Nutrition Delivery  Outcome: Ongoing, Progressing     Problem: Skin and Tissue Injury (Mechanical Ventilation, Invasive)  Goal: Absence of Device-Related Skin and Tissue Injury  Outcome: Ongoing, Progressing  Intervention: Maintain Skin and Tissue Health  Flowsheets (Taken 9/8/2022 1000 by Trevor Wells RN)  Device Skin Pressure Protection:   absorbent pad utilized/changed   adhesive use limited     Problem: Ventilator-Induced Lung Injury (Mechanical Ventilation, Invasive)  Goal: Absence of Ventilator-Induced Lung Injury  Outcome: Ongoing, Progressing  Intervention: Prevent Ventilator-Associated Pneumonia  Flowsheets  Taken 9/9/2022 0400  Head of Bed (HOB) Positioning:   HOB elevated   HOB at 30-45 degrees  Oral Care:   lip/mouth moisturizer applied   swabbed with antiseptic solution  Taken 9/9/2022 0205  VAP Prevention Bundle: HOB elevation maintained  Taken 9/9/2022 0200  Head of Bed (HOB) Positioning:   HOB elevated   HOB at 30-45 degrees  Taken 9/9/2022 0000  Head of Bed (HOB) Positioning:   HOB elevated   HOB at 30-45 degrees  Oral Care:   lip/mouth moisturizer applied   swabbed with antiseptic solution  Taken 9/8/2022 2200  Head of  Bed (HOB) Positioning:   HOB elevated   HOB at 30-45 degrees  Taken 9/8/2022 2005  VAP Prevention Bundle: HOB elevation maintained  VAP Prevention Measures: completed  Taken 9/8/2022 2000  Head of Bed (HOB) Positioning:   HOB elevated   HOB at 30-45 degrees  Oral Care:   lip/mouth moisturizer applied   swabbed with antiseptic solution     Problem: Restraint, Nonbehavioral (Nonviolent)  Goal: Absence of Harm or Injury  Outcome: Ongoing, Progressing  Intervention: Implement Least Restrictive Safety Strategies  Flowsheets  Taken 9/9/2022 0400  Medical Device Protection:   IV pole/bag removed from visual field   torso covered   tubing secured  Less Restrictive Alternative:   bed alarm in use   calming techniques promoted   coping techniques promoted   surveillance provided  De-Escalation Techniques:   increased round frequency   medication administered   quiet time facilitated   stimulation decreased  Diversional Activities: television  Taken 9/9/2022 0205  Diversional Activities: television  Taken 9/9/2022 0200  Medical Device Protection:   IV pole/bag removed from visual field   torso covered   tubing secured  Less Restrictive Alternative:   bed alarm in use   calming techniques promoted   coping techniques promoted   surveillance provided  De-Escalation Techniques:   increased round frequency   stimulation decreased  Diversional Activities: television  Taken 9/9/2022 0000  Medical Device Protection:   IV pole/bag removed from visual field   torso covered   tubing secured  Less Restrictive Alternative:   bed alarm in use   calming techniques promoted   coping techniques promoted   surveillance provided  De-Escalation Techniques:   increased round frequency   stimulation decreased  Diversional Activities: television  Taken 9/8/2022 2200  Medical Device Protection:   IV pole/bag removed from visual field   torso covered   tubing secured  Less Restrictive Alternative:   calming techniques promoted   coping techniques  promoted   bed alarm in use   surveillance provided  De-Escalation Techniques:   increased round frequency   stimulation decreased  Diversional Activities: television  Taken 9/8/2022 2005  Diversional Activities: television  Taken 9/8/2022 2000  Medical Device Protection:   IV pole/bag removed from visual field   torso covered   tubing secured  Less Restrictive Alternative:   calming techniques promoted   coping techniques promoted   bed alarm in use   surveillance provided  De-Escalation Techniques:   increased round frequency   medication administered   reoriented   stimulation decreased  Diversional Activities: television  Taken 9/8/2022 1900  Medical Device Protection:   torso covered   tubing secured  Intervention: Protect Dignity, Rights, and Personal Wellbeing  Flowsheets  Taken 9/9/2022 0205  Trust Relationship/Rapport:   care explained   reassurance provided  Taken 9/8/2022 2005  Trust Relationship/Rapport:   care explained   reassurance provided  Intervention: Protect Skin and Joint Integrity  Flowsheets  Taken 9/9/2022 0400  Body Position:   turned   right   side-lying  Taken 9/9/2022 0200  Body Position:   turned   left   side-lying  Taken 9/9/2022 0000  Body Position:   turned   right   side-lying  Taken 9/8/2022 2200  Body Position:   turned   left   side-lying  Taken 9/8/2022 2000  Body Position:   turned   right   side-lying  Goal: Absence of Harm or Injury  Outcome: Ongoing, Progressing  Intervention: Implement Least Restrictive Safety Strategies  Flowsheets  Taken 9/9/2022 0400  Medical Device Protection:   IV pole/bag removed from visual field   torso covered   tubing secured  Less Restrictive Alternative:   bed alarm in use   calming techniques promoted   coping techniques promoted   surveillance provided  De-Escalation Techniques:   increased round frequency   medication administered   quiet time facilitated   stimulation decreased  Diversional Activities: television  Taken 9/9/2022  0205  Diversional Activities: television  Taken 9/9/2022 0200  Medical Device Protection:   IV pole/bag removed from visual field   torso covered   tubing secured  Less Restrictive Alternative:   bed alarm in use   calming techniques promoted   coping techniques promoted   surveillance provided  De-Escalation Techniques:   increased round frequency   stimulation decreased  Diversional Activities: television  Taken 9/9/2022 0000  Medical Device Protection:   IV pole/bag removed from visual field   torso covered   tubing secured  Less Restrictive Alternative:   bed alarm in use   calming techniques promoted   coping techniques promoted   surveillance provided  De-Escalation Techniques:   increased round frequency   stimulation decreased  Diversional Activities: television  Taken 9/8/2022 2200  Medical Device Protection:   IV pole/bag removed from visual field   torso covered   tubing secured  Less Restrictive Alternative:   calming techniques promoted   coping techniques promoted   bed alarm in use   surveillance provided  De-Escalation Techniques:   increased round frequency   stimulation decreased  Diversional Activities: television  Taken 9/8/2022 2005  Diversional Activities: television  Taken 9/8/2022 2000  Medical Device Protection:   IV pole/bag removed from visual field   torso covered   tubing secured  Less Restrictive Alternative:   calming techniques promoted   coping techniques promoted   bed alarm in use   surveillance provided  De-Escalation Techniques:   increased round frequency   medication administered   reoriented   stimulation decreased  Diversional Activities: television  Taken 9/8/2022 1900  Medical Device Protection:   torso covered   tubing secured  Intervention: Protect Dignity, Rights, and Personal Wellbeing  Flowsheets  Taken 9/9/2022 0205  Trust Relationship/Rapport:   care explained   reassurance provided  Taken 9/8/2022 2005  Trust Relationship/Rapport:   care explained   reassurance  provided  Intervention: Protect Skin and Joint Integrity  Flowsheets  Taken 9/9/2022 0400  Body Position:   turned   right   side-lying  Taken 9/9/2022 0200  Body Position:   turned   left   side-lying  Taken 9/9/2022 0000  Body Position:   turned   right   side-lying  Taken 9/8/2022 2200  Body Position:   turned   left   side-lying  Taken 9/8/2022 2000  Body Position:   turned   right   side-lying   Goal Outcome Evaluation:  Plan of Care Reviewed With: patient

## 2022-09-09 NOTE — PROGRESS NOTES
Neurology Progress Note       Chief Complaint: Seizure activity    Subjective    Subjective     Subjective:  Patient remains intubated and sedated.  Patient became agitated with tachypnea despite max propofol, Versed, and fentanyl.  She was given a propofol bolus and an Ativan drip started per pulmonary and she became more comfortable.  On exam today patient is sedated, not following commands, not moving extremities.  Per nursing staff they are now titrating down sedation slowly.    Review of Systems   Unable to perform ROS: Intubated            Objective    Objective      Temp:  [97.5 °F (36.4 °C)-99 °F (37.2 °C)] 98.2 °F (36.8 °C)  Heart Rate:  [] 81  Resp:  [24-45] 25  BP: ()/(44-87) 102/61  FiO2 (%):  [40 %] 40 %        Neurological Exam  Mental Status    Patient is intubated and sedated with Ativan, Versed, propofol, and fentanyl currently infusing making thorough neurological exam difficult.    Cranial Nerves  CN III, IV, VI: Pupils equal round and reactive to light bilaterally.    Motor  Normal muscle bulk throughout. Normal muscle tone. No abnormal involuntary movements.  No purposeful movement, no response to nailbed pressure and sedation.      Physical Exam  Constitutional:       Appearance: She is obese. She is ill-appearing.   HENT:      Head: Normocephalic.   Eyes:      Pupils: Pupils are equal, round, and reactive to light.   Cardiovascular:      Rate and Rhythm: Normal rate and regular rhythm.   Pulmonary:      Comments: Patient is sedated and intubated  Skin:     General: Skin is warm and dry.   Neurological:      General: No focal deficit present.         Hospital Meds:  Scheduled- ALPRAZolam, 2 mg, Oral, Q8H  chlorhexidine, 15 mL, Mouth/Throat, Q12H  ethyl alcohol, 1 application, Nasal, BID  famotidine, 20 mg, Intravenous, Q12H  gabapentin, 800 mg, Oral, TID  heparin (porcine), 5,000 Units, Subcutaneous, Q8H  levETIRAcetam, 750 mg, Intravenous, Q12H  piperacillin-tazobactam, 3.375 g,  Intravenous, Q8H  sodium chloride, 10 mL, Intravenous, Q12H      Infusions- fentaNYL Citrate,   LORazepam, 0.5-10 mg/hr, Last Rate: 1 mg/hr (09/09/22 1332)  Midazolam 1 mg/mL 100mL NS, 1-10 mg/hr, Last Rate: 10 mg/hr (09/09/22 0522)  norepinephrine, 0.02-0.3 mcg/kg/min, Last Rate: Stopped (09/08/22 1401)  propofol, 5-60 mcg/kg/min, Last Rate: 45 mcg/kg/min (09/09/22 1333)  sodium chloride, 150 mL/hr, Last Rate: 150 mL/hr (09/09/22 1222)       PRNs- LORazepam  •  magnesium sulfate **OR** magnesium sulfate **OR** magnesium sulfate  •  potassium chloride  •  potassium chloride  •  sodium chloride    Results Review:    I reviewed the patient's new clinical results.    Imaging Results (Last 24 Hours)     Procedure Component Value Units Date/Time    XR Chest 1 View [[id number]] Collected: 09/08/22 1423     Updated: 09/08/22 1426    Narrative:      EXAM:    XR Chest, 1 View     EXAM DATE:    9/8/2022 1:33 PM     CLINICAL HISTORY:    confirm OG placement; T50.904A-Poisoning by unspecified drugs,  medicaments and biological substances, undetermined, initial encounter;  R56.9-Unspecified convulsions     TECHNIQUE:    Frontal view of the chest.     COMPARISON:    09/08/2022     FINDINGS:    Lungs:  Patchy left basilar airspace disease. May represent  atelectasis.  Low lung volumes.    Pleural space:  Unremarkable.  No pneumothorax.    Heart:  Unremarkable.  No cardiomegaly.    Mediastinum:  Unremarkable.    Bones/joints:  Unremarkable.    Tubes, lines and devices:  ET tube with tip at level of clavicles.  NG  tube extends into the stomach.  IJ deep line is been removed.    Upper abdomen:  Partial imaging of abdomen demonstrates generalized  ileus pattern.       Impression:      1.  Developing of left patchy basilar airspace disease which may  represent atelectasis or pneumonia.  2.  Support devices as above.     This report was finalized on 9/8/2022 2:24 PM by Dr. José Bennett MD.           Results for orders placed during  the hospital encounter of 07/07/17    Adult Transthoracic Echo Complete    Interpretation Summary  · . Normal left ventricular cavity size and wall thickness noted. All left ventricular wall segments contract normally.  · Estimated EF appears to be in the range of 56 - 60%.  · The aortic valve is structurally normal. No aortic valve regurgitation is present. No aortic valve stenosis is present.  · The mitral valve is normal in structure. No mitral valve regurgitation is present. No significant mitral valve stenosis is present.  · The tricuspid valve is normal. No tricuspid valve stenosis is present. Mild tricuspid valve regurgitation is present. Estimated right ventricular systolic pressure from tricuspid regurgitation is mildly elevated (35-45 mmHg).  · There is no evidence of pericardial effusion.      No results found for: HGBA1C   No results found for: CHOL, CHLPL, TRIG, HDL, LDL, LDLDIRECT   Lab Results   Component Value Date    WBC 6.48 09/09/2022    HGB 8.9 (L) 09/09/2022    HCT 26.7 (L) 09/09/2022    MCV 91.1 09/09/2022     09/09/2022      Lab Results   Component Value Date    GLUCOSE 84 09/09/2022    BUN 6 09/09/2022    CREATININE 0.59 09/09/2022    EGFRIFNONA 96 01/19/2022    BCR 10.2 09/09/2022    K 3.8 09/09/2022    CO2 19.6 (L) 09/09/2022    CALCIUM 7.8 (L) 09/09/2022    ALBUMIN 2.50 (L) 09/09/2022    LABIL2 1.3 (L) 10/24/2015    AST 10 09/09/2022    ALT <5 09/09/2022      Lab Results   Component Value Date    TSH 0.492 09/08/2022     No results found for: [id number]  No results found for: FOLATE          Assessment/Plan     Assessment/Plan:  36-year-old female noted to have seizure activity while [facility].  UDS positive for multiple substances.  Initially given 1500 mg of IV Keppra and started on Keppra 750 mg IV every 12 hours.  No reports of seizure activity since being at the hospital. Patient was initially combative and aggressive she then became lethargic and she was  intubated for airway protection. Sedation increased last night due to agitation, patient has significant benzo/opiate tolerance requiring higher than usual doses of sedation.  Unable to obtain thorough neuro exam due to sedation.      #Seizure activity, likely related to overdose of unknown substance  #Polysubstance abuse, concern for overdose  #Aspiration pneumonia with sepsis     -CT head did not show any acute changes  -UDS positive for amphetamines, methamphetamines, benzodiazepines, and methadone  -Continue Keppra 750 mg IV every 12 hours  -Cefepime with transition to Zosyn as cefepime can lower seizure threshold  -EEG showed nonspecific moderate slowing but no evidence of epilepsy      The case was discussed with nursing staff.  Tele-Neurology will follow along peripherally, please call when sedation has been weaned.    José Gardiner, PEGGY  09/09/22  13:49 EDT    This was an audio and video enabled telemedicine encounter.  Dr. Preston present for encounter via telemedicine.

## 2022-09-10 LAB
A-A DO2: 133.2 MMHG (ref 0–300)
ALBUMIN SERPL-MCNC: 2.61 G/DL (ref 3.5–5.2)
ALBUMIN/GLOB SERPL: 0.9 G/DL
ALP SERPL-CCNC: 63 U/L (ref 39–117)
ALT SERPL W P-5'-P-CCNC: 37 U/L (ref 1–33)
ANION GAP SERPL CALCULATED.3IONS-SCNC: 11.2 MMOL/L (ref 5–15)
ARTERIAL PATENCY WRIST A: POSITIVE
AST SERPL-CCNC: 81 U/L (ref 1–32)
ATMOSPHERIC PRESS: 728 MMHG
BACTERIA SPEC AEROBE CULT: ABNORMAL
BASE EXCESS BLDA CALC-SCNC: -4.1 MMOL/L (ref 0–2)
BASOPHILS # BLD AUTO: 0.03 10*3/MM3 (ref 0–0.2)
BASOPHILS NFR BLD AUTO: 0.5 % (ref 0–1.5)
BDY SITE: ABNORMAL
BH CV ECHO MEAS - ACS: 2.3 CM
BH CV ECHO MEAS - AO MAX PG: 7.3 MMHG
BH CV ECHO MEAS - AO MEAN PG: 4 MMHG
BH CV ECHO MEAS - AO ROOT DIAM: 2.8 CM
BH CV ECHO MEAS - AO V2 MAX: 135 CM/SEC
BH CV ECHO MEAS - AO V2 VTI: 32.6 CM
BH CV ECHO MEAS - EDV(CUBED): 79.5 ML
BH CV ECHO MEAS - EDV(MOD-SP4): 64.8 ML
BH CV ECHO MEAS - EF(MOD-SP4): 68.1 %
BH CV ECHO MEAS - ESV(CUBED): 27 ML
BH CV ECHO MEAS - ESV(MOD-SP4): 20.7 ML
BH CV ECHO MEAS - FS: 30.2 %
BH CV ECHO MEAS - IVS/LVPW: 0.85 CM
BH CV ECHO MEAS - IVSD: 1.1 CM
BH CV ECHO MEAS - LA DIMENSION: 1.8 CM
BH CV ECHO MEAS - LAT PEAK E' VEL: 10.9 CM/SEC
BH CV ECHO MEAS - LV DIASTOLIC VOL/BSA (35-75): 30.4 CM2
BH CV ECHO MEAS - LV MASS(C)D: 184.7 GRAMS
BH CV ECHO MEAS - LV SYSTOLIC VOL/BSA (12-30): 9.7 CM2
BH CV ECHO MEAS - LVIDD: 4.3 CM
BH CV ECHO MEAS - LVIDS: 3 CM
BH CV ECHO MEAS - LVOT AREA: 3.1 CM2
BH CV ECHO MEAS - LVOT DIAM: 2 CM
BH CV ECHO MEAS - LVPWD: 1.3 CM
BH CV ECHO MEAS - MED PEAK E' VEL: 11.5 CM/SEC
BH CV ECHO MEAS - MV A MAX VEL: 68.9 CM/SEC
BH CV ECHO MEAS - MV E MAX VEL: 89.7 CM/SEC
BH CV ECHO MEAS - MV E/A: 1.3
BH CV ECHO MEAS - PA ACC TIME: 0.09 SEC
BH CV ECHO MEAS - PA PR(ACCEL): 39.4 MMHG
BH CV ECHO MEAS - RAP SYSTOLE: 10 MMHG
BH CV ECHO MEAS - RVSP: 30.8 MMHG
BH CV ECHO MEAS - SI(MOD-SP4): 20.7 ML/M2
BH CV ECHO MEAS - SV(MOD-SP4): 44.1 ML
BH CV ECHO MEAS - TAPSE (>1.6): 2.43 CM
BH CV ECHO MEAS - TR MAX PG: 20.8 MMHG
BH CV ECHO MEAS - TR MAX VEL: 228 CM/SEC
BH CV ECHO MEASUREMENTS AVERAGE E/E' RATIO: 8.01
BILIRUB SERPL-MCNC: 0.2 MG/DL (ref 0–1.2)
BODY TEMPERATURE: 0 C
BUN SERPL-MCNC: 5 MG/DL (ref 6–20)
BUN/CREAT SERPL: 10.9 (ref 7–25)
CALCIUM SPEC-SCNC: 7.6 MG/DL (ref 8.6–10.5)
CHLORIDE SERPL-SCNC: 109 MMOL/L (ref 98–107)
CK SERPL-CCNC: 4694 U/L (ref 20–180)
CO2 BLDA-SCNC: 21.6 MMOL/L (ref 22–33)
CO2 SERPL-SCNC: 18.8 MMOL/L (ref 22–29)
COHGB MFR BLD: 1.2 % (ref 0–5)
CREAT SERPL-MCNC: 0.46 MG/DL (ref 0.57–1)
DEPRECATED RDW RBC AUTO: 47.9 FL (ref 37–54)
EGFRCR SERPLBLD CKD-EPI 2021: 129 ML/MIN/1.73
EOSINOPHIL # BLD AUTO: 0.16 10*3/MM3 (ref 0–0.4)
EOSINOPHIL NFR BLD AUTO: 2.6 % (ref 0.3–6.2)
ERYTHROCYTE [DISTWIDTH] IN BLOOD BY AUTOMATED COUNT: 14.5 % (ref 12.3–15.4)
GLOBULIN UR ELPH-MCNC: 2.8 GM/DL
GLUCOSE BLDC GLUCOMTR-MCNC: 64 MG/DL (ref 70–130)
GLUCOSE BLDC GLUCOMTR-MCNC: 93 MG/DL (ref 70–130)
GLUCOSE SERPL-MCNC: 71 MG/DL (ref 65–99)
GRAM STN SPEC: ABNORMAL
HCO3 BLDA-SCNC: 20.6 MMOL/L (ref 20–26)
HCT VFR BLD AUTO: 28.4 % (ref 34–46.6)
HCT VFR BLD CALC: 29.5 % (ref 38–51)
HGB BLD-MCNC: 9 G/DL (ref 12–15.9)
HGB BLDA-MCNC: 9.6 G/DL (ref 13.5–17.5)
IMM GRANULOCYTES # BLD AUTO: 0.02 10*3/MM3 (ref 0–0.05)
IMM GRANULOCYTES NFR BLD AUTO: 0.3 % (ref 0–0.5)
INHALED O2 CONCENTRATION: 40 %
ISOLATED FROM: ABNORMAL
LEFT ATRIUM VOLUME INDEX: 21.1 ML/M2
LYMPHOCYTES # BLD AUTO: 1.61 10*3/MM3 (ref 0.7–3.1)
LYMPHOCYTES NFR BLD AUTO: 26 % (ref 19.6–45.3)
Lab: ABNORMAL
MAXIMAL PREDICTED HEART RATE: 186 BPM
MCH RBC QN AUTO: 28.9 PG (ref 26.6–33)
MCHC RBC AUTO-ENTMCNC: 31.7 G/DL (ref 31.5–35.7)
MCV RBC AUTO: 91.3 FL (ref 79–97)
METHGB BLD QL: 0.5 % (ref 0–3)
MODALITY: ABNORMAL
MONOCYTES # BLD AUTO: 0.48 10*3/MM3 (ref 0.1–0.9)
MONOCYTES NFR BLD AUTO: 7.7 % (ref 5–12)
NEUTROPHILS NFR BLD AUTO: 3.9 10*3/MM3 (ref 1.7–7)
NEUTROPHILS NFR BLD AUTO: 62.9 % (ref 42.7–76)
NOTE: ABNORMAL
NRBC BLD AUTO-RTO: 0 /100 WBC (ref 0–0.2)
OXYHGB MFR BLDV: 97 % (ref 94–99)
PCO2 BLDA: 35.1 MM HG (ref 35–45)
PCO2 TEMP ADJ BLD: ABNORMAL MM[HG]
PEEP RESPIRATORY: 5 CM[H2O]
PH BLDA: 7.38 PH UNITS (ref 7.35–7.45)
PH, TEMP CORRECTED: ABNORMAL
PLATELET # BLD AUTO: 165 10*3/MM3 (ref 140–450)
PMV BLD AUTO: 8.5 FL (ref 6–12)
PO2 BLDA: 101 MM HG (ref 83–108)
PO2 TEMP ADJ BLD: ABNORMAL MM[HG]
POTASSIUM SERPL-SCNC: 3.6 MMOL/L (ref 3.5–5.2)
POTASSIUM SERPL-SCNC: 4 MMOL/L (ref 3.5–5.2)
PROT SERPL-MCNC: 5.4 G/DL (ref 6–8.5)
RBC # BLD AUTO: 3.11 10*6/MM3 (ref 3.77–5.28)
SAO2 % BLDCOA: 98.7 % (ref 94–99)
SET MECH RESP RATE: 25
SODIUM SERPL-SCNC: 139 MMOL/L (ref 136–145)
STRESS TARGET HR: 158 BPM
VENTILATOR MODE: ABNORMAL
VT ON VENT VENT: 400 ML
WBC NRBC COR # BLD: 6.2 10*3/MM3 (ref 3.4–10.8)

## 2022-09-10 PROCEDURE — 94799 UNLISTED PULMONARY SVC/PX: CPT

## 2022-09-10 PROCEDURE — 94761 N-INVAS EAR/PLS OXIMETRY MLT: CPT

## 2022-09-10 PROCEDURE — 87040 BLOOD CULTURE FOR BACTERIA: CPT | Performed by: INTERNAL MEDICINE

## 2022-09-10 PROCEDURE — 82805 BLOOD GASES W/O2 SATURATION: CPT

## 2022-09-10 PROCEDURE — 82550 ASSAY OF CK (CPK): CPT | Performed by: INTERNAL MEDICINE

## 2022-09-10 PROCEDURE — 82375 ASSAY CARBOXYHB QUANT: CPT

## 2022-09-10 PROCEDURE — 94003 VENT MGMT INPAT SUBQ DAY: CPT

## 2022-09-10 PROCEDURE — 82962 GLUCOSE BLOOD TEST: CPT

## 2022-09-10 PROCEDURE — 99291 CRITICAL CARE FIRST HOUR: CPT | Performed by: INTERNAL MEDICINE

## 2022-09-10 PROCEDURE — 25010000002 PROPOFOL 10 MG/ML EMULSION: Performed by: INTERNAL MEDICINE

## 2022-09-10 PROCEDURE — 25010000002 LORAZEPAM PER 2 MG: Performed by: INTERNAL MEDICINE

## 2022-09-10 PROCEDURE — 99233 SBSQ HOSP IP/OBS HIGH 50: CPT | Performed by: INTERNAL MEDICINE

## 2022-09-10 PROCEDURE — 80053 COMPREHEN METABOLIC PANEL: CPT | Performed by: INTERNAL MEDICINE

## 2022-09-10 PROCEDURE — 36600 WITHDRAWAL OF ARTERIAL BLOOD: CPT

## 2022-09-10 PROCEDURE — 25010000002 PIPERACILLIN SOD-TAZOBACTAM PER 1 G: Performed by: INTERNAL MEDICINE

## 2022-09-10 PROCEDURE — 25010000002 LEVETRIRACETAM PER 10 MG: Performed by: INTERNAL MEDICINE

## 2022-09-10 PROCEDURE — 25010000002 HEPARIN (PORCINE) PER 1000 UNITS: Performed by: INTERNAL MEDICINE

## 2022-09-10 PROCEDURE — 84132 ASSAY OF SERUM POTASSIUM: CPT | Performed by: INTERNAL MEDICINE

## 2022-09-10 PROCEDURE — 85025 COMPLETE CBC W/AUTO DIFF WBC: CPT | Performed by: INTERNAL MEDICINE

## 2022-09-10 PROCEDURE — 83050 HGB METHEMOGLOBIN QUAN: CPT

## 2022-09-10 RX ORDER — DEXTROSE MONOHYDRATE 25 G/50ML
50 INJECTION, SOLUTION INTRAVENOUS
Status: DISCONTINUED | OUTPATIENT
Start: 2022-09-10 | End: 2022-09-14 | Stop reason: HOSPADM

## 2022-09-10 RX ORDER — NICOTINE POLACRILEX 4 MG
15 LOZENGE BUCCAL
Status: DISCONTINUED | OUTPATIENT
Start: 2022-09-10 | End: 2022-09-14 | Stop reason: HOSPADM

## 2022-09-10 RX ORDER — DEXTROSE MONOHYDRATE 25 G/50ML
INJECTION, SOLUTION INTRAVENOUS
Status: COMPLETED
Start: 2022-09-10 | End: 2022-09-10

## 2022-09-10 RX ORDER — POTASSIUM CHLORIDE 1.5 G/1.77G
40 POWDER, FOR SOLUTION ORAL EVERY 4 HOURS
Status: COMPLETED | OUTPATIENT
Start: 2022-09-10 | End: 2022-09-10

## 2022-09-10 RX ORDER — DEXTROSE MONOHYDRATE 25 G/50ML
25 INJECTION, SOLUTION INTRAVENOUS
Status: DISCONTINUED | OUTPATIENT
Start: 2022-09-10 | End: 2022-09-14 | Stop reason: HOSPADM

## 2022-09-10 RX ADMIN — FAMOTIDINE 20 MG: 10 INJECTION, SOLUTION INTRAVENOUS at 08:18

## 2022-09-10 RX ADMIN — HEPARIN SODIUM 5000 UNITS: 5000 INJECTION INTRAVENOUS; SUBCUTANEOUS at 15:14

## 2022-09-10 RX ADMIN — LORAZEPAM 4 MG: 2 INJECTION INTRAMUSCULAR; INTRAVENOUS at 22:49

## 2022-09-10 RX ADMIN — GABAPENTIN 800 MG: 400 CAPSULE ORAL at 08:18

## 2022-09-10 RX ADMIN — PROPOFOL 35 MCG/KG/MIN: 10 INJECTION, EMULSION INTRAVENOUS at 04:40

## 2022-09-10 RX ADMIN — PROPOFOL 35 MCG/KG/MIN: 10 INJECTION, EMULSION INTRAVENOUS at 21:36

## 2022-09-10 RX ADMIN — Medication 10 ML: at 22:00

## 2022-09-10 RX ADMIN — HEPARIN SODIUM 5000 UNITS: 5000 INJECTION INTRAVENOUS; SUBCUTANEOUS at 05:00

## 2022-09-10 RX ADMIN — PROPOFOL 35 MCG/KG/MIN: 10 INJECTION, EMULSION INTRAVENOUS at 16:29

## 2022-09-10 RX ADMIN — ETHYL ALCOHOL 1 EACH: 62 SWAB TOPICAL at 22:00

## 2022-09-10 RX ADMIN — PIPERACILLIN SODIUM AND TAZOBACTAM SODIUM 3.38 G: 3; .375 INJECTION, POWDER, LYOPHILIZED, FOR SOLUTION INTRAVENOUS at 19:43

## 2022-09-10 RX ADMIN — Medication: at 21:57

## 2022-09-10 RX ADMIN — PROPOFOL 35 MCG/KG/MIN: 10 INJECTION, EMULSION INTRAVENOUS at 00:30

## 2022-09-10 RX ADMIN — CHLORHEXIDINE GLUCONATE 15 ML: 1.2 RINSE ORAL at 08:18

## 2022-09-10 RX ADMIN — Medication: at 07:04

## 2022-09-10 RX ADMIN — PIPERACILLIN SODIUM AND TAZOBACTAM SODIUM 3.38 G: 3; .375 INJECTION, POWDER, LYOPHILIZED, FOR SOLUTION INTRAVENOUS at 11:39

## 2022-09-10 RX ADMIN — POTASSIUM CHLORIDE 40 MEQ: 1.5 POWDER, FOR SOLUTION ORAL at 09:06

## 2022-09-10 RX ADMIN — DEXTROSE MONOHYDRATE 50 ML: 25 INJECTION, SOLUTION INTRAVENOUS at 07:02

## 2022-09-10 RX ADMIN — PIPERACILLIN SODIUM AND TAZOBACTAM SODIUM 3.38 G: 3; .375 INJECTION, POWDER, LYOPHILIZED, FOR SOLUTION INTRAVENOUS at 04:55

## 2022-09-10 RX ADMIN — GABAPENTIN 800 MG: 400 CAPSULE ORAL at 15:47

## 2022-09-10 RX ADMIN — ALPRAZOLAM 2 MG: 1 TABLET ORAL at 15:14

## 2022-09-10 RX ADMIN — PROPOFOL 35 MCG/KG/MIN: 10 INJECTION, EMULSION INTRAVENOUS at 09:01

## 2022-09-10 RX ADMIN — ALPRAZOLAM 2 MG: 1 TABLET ORAL at 05:00

## 2022-09-10 RX ADMIN — SODIUM CHLORIDE 150 ML/HR: 9 INJECTION, SOLUTION INTRAVENOUS at 04:22

## 2022-09-10 RX ADMIN — SODIUM CHLORIDE 100 ML/HR: 9 INJECTION, SOLUTION INTRAVENOUS at 13:41

## 2022-09-10 RX ADMIN — GABAPENTIN 800 MG: 400 CAPSULE ORAL at 22:00

## 2022-09-10 RX ADMIN — Medication: at 14:16

## 2022-09-10 RX ADMIN — LEVETIRACETAM 750 MG: 100 INJECTION INTRAVENOUS at 22:00

## 2022-09-10 RX ADMIN — LEVETIRACETAM 750 MG: 100 INJECTION INTRAVENOUS at 09:03

## 2022-09-10 RX ADMIN — HEPARIN SODIUM 5000 UNITS: 5000 INJECTION INTRAVENOUS; SUBCUTANEOUS at 22:00

## 2022-09-10 RX ADMIN — ETHYL ALCOHOL 1 EACH: 62 SWAB TOPICAL at 08:18

## 2022-09-10 RX ADMIN — FAMOTIDINE 20 MG: 10 INJECTION, SOLUTION INTRAVENOUS at 21:59

## 2022-09-10 RX ADMIN — CHLORHEXIDINE GLUCONATE 15 ML: 1.2 RINSE ORAL at 22:00

## 2022-09-10 RX ADMIN — Medication 10 ML: at 08:19

## 2022-09-10 RX ADMIN — Medication: at 01:03

## 2022-09-10 RX ADMIN — ALPRAZOLAM 2 MG: 1 TABLET ORAL at 22:00

## 2022-09-10 RX ADMIN — POTASSIUM CHLORIDE 40 MEQ: 1.5 POWDER, FOR SOLUTION ORAL at 06:45

## 2022-09-10 NOTE — PLAN OF CARE
Problem: Adult Inpatient Plan of Care  Goal: Absence of Hospital-Acquired Illness or Injury  Intervention: Identify and Manage Fall Risk  Recent Flowsheet Documentation  Taken 9/10/2022 0500 by Basia Mtz RN  Safety Promotion/Fall Prevention:   safety round/check completed   clutter free environment maintained   fall prevention program maintained  Taken 9/10/2022 0400 by Basia Mtz RN  Safety Promotion/Fall Prevention:   safety round/check completed   clutter free environment maintained   fall prevention program maintained  Taken 9/10/2022 0300 by Basia Mtz RN  Safety Promotion/Fall Prevention:   safety round/check completed   clutter free environment maintained   fall prevention program maintained  Taken 9/10/2022 0200 by Basia Mtz RN  Safety Promotion/Fall Prevention:   safety round/check completed   clutter free environment maintained   fall prevention program maintained  Taken 9/10/2022 0100 by Basia Mtz RN  Safety Promotion/Fall Prevention:   safety round/check completed   clutter free environment maintained   fall prevention program maintained  Taken 9/10/2022 0000 by Basia Mtz RN  Safety Promotion/Fall Prevention:   safety round/check completed   clutter free environment maintained   fall prevention program maintained  Taken 9/9/2022 2300 by Basia Mtz RN  Safety Promotion/Fall Prevention:   safety round/check completed   clutter free environment maintained   fall prevention program maintained  Taken 9/9/2022 2200 by Basia Mtz RN  Safety Promotion/Fall Prevention:   safety round/check completed   clutter free environment maintained   fall prevention program maintained  Taken 9/9/2022 2100 by Basia Mtz RN  Safety Promotion/Fall Prevention:   safety round/check completed   clutter free environment maintained   fall prevention program maintained  Taken 9/9/2022 2000 by Basia Mtz RN  Safety Promotion/Fall Prevention:   safety  round/check completed   clutter free environment maintained   fall prevention program maintained  Taken 9/9/2022 1900 by Basia Mtz RN  Safety Promotion/Fall Prevention:   safety round/check completed   clutter free environment maintained   fall prevention program maintained     Problem: Adult Inpatient Plan of Care  Goal: Absence of Hospital-Acquired Illness or Injury  Intervention: Prevent Skin Injury  Recent Flowsheet Documentation  Taken 9/10/2022 0400 by Basia Mtz RN  Body Position:   turned   right   side-lying  Taken 9/10/2022 0200 by Basia Mtz RN  Body Position:   turned   left   side-lying  Taken 9/10/2022 0000 by Basia Mtz RN  Body Position:   turned   right   side-lying  Taken 9/9/2022 2200 by Basia Mtz RN  Body Position:   turned   left   side-lying  Taken 9/9/2022 2005 by Basia Mtz RN  Skin Protection:   adhesive use limited   pulse oximeter probe site changed   tubing/devices free from skin contact  Taken 9/9/2022 2000 by Basia Mtz RN  Body Position:   turned   right   side-lying     Problem: Adult Inpatient Plan of Care  Goal: Absence of Hospital-Acquired Illness or Injury  Intervention: Prevent and Manage VTE (Venous Thromboembolism) Risk  Recent Flowsheet Documentation  Taken 9/10/2022 0205 by Basia Mtz RN  Activity Management: bedrest  VTE Prevention/Management: (see MAR) other (see comments)  Taken 9/9/2022 2005 by Basia Mtz RN  Activity Management: bedrest  VTE Prevention/Management: (see MAR) other (see comments)     Problem: Adult Inpatient Plan of Care  Goal: Absence of Hospital-Acquired Illness or Injury  Intervention: Prevent Infection  Recent Flowsheet Documentation  Taken 9/10/2022 0400 by Basia Mtz RN  Infection Prevention:   environmental surveillance performed   hand hygiene promoted   rest/sleep promoted   single patient room provided  Taken 9/10/2022 0200 by Basia Mtz RN  Infection Prevention:    environmental surveillance performed   hand hygiene promoted   rest/sleep promoted   single patient room provided  Taken 9/10/2022 0000 by Basia Mtz RN  Infection Prevention:   environmental surveillance performed   hand hygiene promoted   rest/sleep promoted   single patient room provided  Taken 9/9/2022 2200 by Basia Mtz RN  Infection Prevention:   environmental surveillance performed   hand hygiene promoted   rest/sleep promoted   single patient room provided  Taken 9/9/2022 2000 by Basia Mtz RN  Infection Prevention:   environmental surveillance performed   hand hygiene promoted   rest/sleep promoted   single patient room provided     Problem: Skin Injury Risk Increased  Goal: Skin Health and Integrity  Outcome: Ongoing, Progressing  Intervention: Optimize Skin Protection  Recent Flowsheet Documentation  Taken 9/10/2022 0400 by Basia Mtz RN  Head of Bed (HOB) Positioning:   HOB elevated   HOB at 30-45 degrees  Taken 9/10/2022 0205 by Basia Mtz RN  Pressure Reduction Techniques:   heels elevated off bed   pressure points protected   weight shift assistance provided  Pressure Reduction Devices:   heel offloading device utilized   positioning supports utilized   pressure-redistributing mattress utilized  Taken 9/10/2022 0200 by Basia Mtz RN  Head of Bed (HOB) Positioning:   HOB elevated   HOB at 30-45 degrees  Taken 9/10/2022 0000 by Basia Mtz RN  Head of Bed (HOB) Positioning:   HOB elevated   HOB at 30-45 degrees  Taken 9/9/2022 2200 by Basia Mtz RN  Head of Bed (HOB) Positioning:   HOB elevated   HOB at 30-45 degrees  Taken 9/9/2022 2005 by Basia Mtz RN  Pressure Reduction Techniques:   heels elevated off bed   pressure points protected   weight shift assistance provided  Pressure Reduction Devices:   heel offloading device utilized   positioning supports utilized   pressure-redistributing mattress utilized  Skin Protection:   adhesive use  limited   pulse oximeter probe site changed   tubing/devices free from skin contact  Taken 9/9/2022 2000 by Basia Mtz RN  Head of Bed ([facility]) Positioning:   HOB elevated   HOB at 30-45 degrees     Problem: Skin Injury Risk Increased  Goal: Skin Health and Integrity  Intervention: Optimize Skin Protection  Recent Flowsheet Documentation  Taken 9/10/2022 0400 by Basia Mtz RN  Head of Bed ([facility]) Positioning:   HOB elevated   HOB at 30-45 degrees  Taken 9/10/2022 0205 by Basia Mtz RN  Pressure Reduction Techniques:   heels elevated off bed   pressure points protected   weight shift assistance provided  Pressure Reduction Devices:   heel offloading device utilized   positioning supports utilized   pressure-redistributing mattress utilized  Taken 9/10/2022 0200 by Basia Mtz RN  Head of Bed ([facility]) Positioning:   HOB elevated   HOB at 30-45 degrees  Taken 9/10/2022 0000 by Basia Mtz RN  Head of Bed ([facility]) Positioning:   HOB elevated   HOB at 30-45 degrees  Taken 9/9/2022 2200 by Basia Mtz RN  Head of Bed ([facility]) Positioning:   HOB elevated   HOB at 30-45 degrees  Taken 9/9/2022 2005 by Basia Mtz RN  Pressure Reduction Techniques:   heels elevated off bed   pressure points protected   weight shift assistance provided  Pressure Reduction Devices:   heel offloading device utilized   positioning supports utilized   pressure-redistributing mattress utilized  Skin Protection:   adhesive use limited   pulse oximeter probe site changed   tubing/devices free from skin contact  Taken 9/9/2022 2000 by Basia Mtz RN  Head of Bed ([facility]) Positioning:   HOB elevated   HOB at 30-45 degrees     Problem: Fall Injury Risk  Goal: Absence of Fall and Fall-Related Injury  Outcome: Ongoing, Progressing  Intervention: Identify and Manage Contributors  Recent Flowsheet Documentation  Taken 9/10/2022 0400 by Basia Mtz RN  Medication Review/Management: medications reviewed  Taken  9/10/2022 0200 by Basia Mtz RN  Medication Review/Management: medications reviewed  Taken 9/10/2022 0000 by Basia Mtz RN  Medication Review/Management: medications reviewed  Taken 9/9/2022 2200 by Basia Mtz RN  Medication Review/Management: medications reviewed  Taken 9/9/2022 2000 by Basia Mtz RN  Medication Review/Management: medications reviewed  Intervention: Promote Injury-Free Environment  Recent Flowsheet Documentation  Taken 9/10/2022 0500 by Basia Mtz RN  Safety Promotion/Fall Prevention:   safety round/check completed   clutter free environment maintained   fall prevention program maintained  Taken 9/10/2022 0400 by Basia Mtz RN  Safety Promotion/Fall Prevention:   safety round/check completed   clutter free environment maintained   fall prevention program maintained  Taken 9/10/2022 0300 by Basia Mtz RN  Safety Promotion/Fall Prevention:   safety round/check completed   clutter free environment maintained   fall prevention program maintained  Taken 9/10/2022 0200 by Basia Mtz RN  Safety Promotion/Fall Prevention:   safety round/check completed   clutter free environment maintained   fall prevention program maintained  Taken 9/10/2022 0100 by Basia Mtz RN  Safety Promotion/Fall Prevention:   safety round/check completed   clutter free environment maintained   fall prevention program maintained  Taken 9/10/2022 0000 by Basia Mtz RN  Safety Promotion/Fall Prevention:   safety round/check completed   clutter free environment maintained   fall prevention program maintained  Taken 9/9/2022 2300 by Basia Mtz RN  Safety Promotion/Fall Prevention:   safety round/check completed   clutter free environment maintained   fall prevention program maintained  Taken 9/9/2022 2200 by Basia Mtz RN  Safety Promotion/Fall Prevention:   safety round/check completed   clutter free environment maintained   fall prevention program  maintained  Taken 9/9/2022 2100 by Basia Mtz RN  Safety Promotion/Fall Prevention:   safety round/check completed   clutter free environment maintained   fall prevention program maintained  Taken 9/9/2022 2000 by Basia Mtz RN  Safety Promotion/Fall Prevention:   safety round/check completed   clutter free environment maintained   fall prevention program maintained  Taken 9/9/2022 1900 by Basia Mtz RN  Safety Promotion/Fall Prevention:   safety round/check completed   clutter free environment maintained   fall prevention program maintained     Problem: Fall Injury Risk  Goal: Absence of Fall and Fall-Related Injury  Intervention: Promote Injury-Free Environment  Recent Flowsheet Documentation  Taken 9/10/2022 0500 by Basia Mtz RN  Safety Promotion/Fall Prevention:   safety round/check completed   clutter free environment maintained   fall prevention program maintained  Taken 9/10/2022 0400 by Basia Mtz RN  Safety Promotion/Fall Prevention:   safety round/check completed   clutter free environment maintained   fall prevention program maintained  Taken 9/10/2022 0300 by Basia Mtz RN  Safety Promotion/Fall Prevention:   safety round/check completed   clutter free environment maintained   fall prevention program maintained  Taken 9/10/2022 0200 by Basia Mtz RN  Safety Promotion/Fall Prevention:   safety round/check completed   clutter free environment maintained   fall prevention program maintained  Taken 9/10/2022 0100 by Basia Mtz RN  Safety Promotion/Fall Prevention:   safety round/check completed   clutter free environment maintained   fall prevention program maintained  Taken 9/10/2022 0000 by Basia Mtz RN  Safety Promotion/Fall Prevention:   safety round/check completed   clutter free environment maintained   fall prevention program maintained  Taken 9/9/2022 2300 by Basia Mtz RN  Safety Promotion/Fall Prevention:   safety round/check  completed   clutter free environment maintained   fall prevention program maintained  Taken 9/9/2022 2200 by Basia Mtz RN  Safety Promotion/Fall Prevention:   safety round/check completed   clutter free environment maintained   fall prevention program maintained  Taken 9/9/2022 2100 by Basia Mzt RN  Safety Promotion/Fall Prevention:   safety round/check completed   clutter free environment maintained   fall prevention program maintained  Taken 9/9/2022 2000 by Basia Mtz RN  Safety Promotion/Fall Prevention:   safety round/check completed   clutter free environment maintained   fall prevention program maintained  Taken 9/9/2022 1900 by Basia Mtz RN  Safety Promotion/Fall Prevention:   safety round/check completed   clutter free environment maintained   fall prevention program maintained     Problem: Restraint, Nonbehavioral (Nonviolent)  Goal: Absence of Harm or Injury  Outcome: Ongoing, Progressing  Intervention: Implement Least Restrictive Safety Strategies  Flowsheets  Taken 9/10/2022 0600  Medical Device Protection: tubing secured  Less Restrictive Alternative:   bed alarm in use   calming techniques promoted   coping techniques promoted  De-Escalation Techniques:   increased round frequency   medication administered  Diversional Activities: television  Taken 9/10/2022 0400  Medical Device Protection: tubing secured  Less Restrictive Alternative:   bed alarm in use   calming techniques promoted   coping techniques promoted  De-Escalation Techniques:   increased round frequency   medication administered  Diversional Activities: television  Taken 9/10/2022 0205  Diversional Activities: television  Taken 9/10/2022 0200  Medical Device Protection: tubing secured  Less Restrictive Alternative:   bed alarm in use   calming techniques promoted   coping techniques promoted  De-Escalation Techniques:   increased round frequency   medication administered  Diversional Activities:  television  Taken 9/10/2022 0000  Medical Device Protection: tubing secured  Less Restrictive Alternative:   bed alarm in use   calming techniques promoted   coping techniques promoted  De-Escalation Techniques:   increased round frequency   medication administered  Diversional Activities: television  Taken 9/9/2022 2200  Medical Device Protection: tubing secured  Less Restrictive Alternative:   bed alarm in use   calming techniques promoted   coping techniques promoted  De-Escalation Techniques:   increased round frequency   medication administered  Diversional Activities: television  Taken 9/9/2022 2005  Diversional Activities: television  Taken 9/9/2022 2000  Medical Device Protection: tubing secured  Less Restrictive Alternative:   bed alarm in use   calming techniques promoted   coping techniques promoted  De-Escalation Techniques:   increased round frequency   medication administered  Diversional Activities: television  Intervention: Protect Dignity, Rights, and Personal Wellbeing  Flowsheets  Taken 9/10/2022 0205  Trust Relationship/Rapport:   care explained   reassurance provided  Taken 9/9/2022 2005  Trust Relationship/Rapport:   care explained   reassurance provided  Intervention: Protect Skin and Joint Integrity  Flowsheets  Taken 9/10/2022 0600 by Hannah Chapman RN  Body Position:   left   turned   side-lying  Taken 9/10/2022 0400 by Basia Mtz RN  Body Position:   turned   right   side-lying  Taken 9/10/2022 0200 by Basia Mtz RN  Body Position:   turned   left   side-lying  Taken 9/10/2022 0000 by Basia Mtz RN  Body Position:   turned   right   side-lying  Taken 9/9/2022 2200 by Basia Mtz RN  Body Position:   turned   left   side-lying  Taken 9/9/2022 2000 by Basia Mtz RN  Body Position:   turned   right   side-lying     Problem: Restraint, Nonbehavioral (Nonviolent)  Goal: Absence of Harm or Injury  Intervention: Protect Skin and Joint  Integrity  Flowsheets  Taken 9/10/2022 0600 by Hannah Chapman RN  Body Position:   left   turned   side-lying  Taken 9/10/2022 0400 by Basia Mtz RN  Body Position:   turned   right   side-lying  Taken 9/10/2022 0200 by Basia Mtz RN  Body Position:   turned   left   side-lying  Taken 9/10/2022 0000 by Basia Mtz RN  Body Position:   turned   right   side-lying  Taken 9/9/2022 2200 by Basia Mtz RN  Body Position:   turned   left   side-lying  Taken 9/9/2022 2000 by Basia Mtz RN  Body Position:   turned   right   side-lying     Problem: Restraint, Nonbehavioral (Nonviolent)  Goal: Absence of Harm or Injury  Outcome: Ongoing, Progressing  Intervention: Implement Least Restrictive Safety Strategies  Flowsheets  Taken 9/10/2022 0600  Medical Device Protection: tubing secured  Less Restrictive Alternative:   bed alarm in use   calming techniques promoted   coping techniques promoted  De-Escalation Techniques:   increased round frequency   medication administered  Diversional Activities: television  Taken 9/10/2022 0400  Medical Device Protection: tubing secured  Less Restrictive Alternative:   bed alarm in use   calming techniques promoted   coping techniques promoted  De-Escalation Techniques:   increased round frequency   medication administered  Diversional Activities: television  Taken 9/10/2022 0205  Diversional Activities: television  Taken 9/10/2022 0200  Medical Device Protection: tubing secured  Less Restrictive Alternative:   bed alarm in use   calming techniques promoted   coping techniques promoted  De-Escalation Techniques:   increased round frequency   medication administered  Diversional Activities: television  Taken 9/10/2022 0000  Medical Device Protection: tubing secured  Less Restrictive Alternative:   bed alarm in use   calming techniques promoted   coping techniques promoted  De-Escalation Techniques:   increased round frequency   medication  administered  Diversional Activities: television  Taken 9/9/2022 2200  Medical Device Protection: tubing secured  Less Restrictive Alternative:   bed alarm in use   calming techniques promoted   coping techniques promoted  De-Escalation Techniques:   increased round frequency   medication administered  Diversional Activities: television  Taken 9/9/2022 2005  Diversional Activities: television  Taken 9/9/2022 2000  Medical Device Protection: tubing secured  Less Restrictive Alternative:   bed alarm in use   calming techniques promoted   coping techniques promoted  De-Escalation Techniques:   increased round frequency   medication administered  Diversional Activities: television  Intervention: Protect Dignity, Rights, and Personal Wellbeing  Flowsheets  Taken 9/10/2022 0205  Trust Relationship/Rapport:   care explained   reassurance provided  Taken 9/9/2022 2005  Trust Relationship/Rapport:   care explained   reassurance provided  Intervention: Protect Skin and Joint Integrity  Flowsheets  Taken 9/10/2022 0600 by Hannah Chapman RN  Body Position:   left   turned   side-lying  Taken 9/10/2022 0400 by Basia Mtz RN  Body Position:   turned   right   side-lying  Taken 9/10/2022 0200 by Basia Mtz RN  Body Position:   turned   left   side-lying  Taken 9/10/2022 0000 by Basia Mtz RN  Body Position:   turned   right   side-lying  Taken 9/9/2022 2200 by Basia Mtz RN  Body Position:   turned   left   side-lying  Taken 9/9/2022 2000 by Basia Mtz RN  Body Position:   turned   right   side-lying     Problem: Restraint, Nonbehavioral (Nonviolent)  Goal: Absence of Harm or Injury  Intervention: Protect Skin and Joint Integrity  Flowsheets  Taken 9/10/2022 0600 by Hannah Chapman RN  Body Position:   left   turned   side-lying  Taken 9/10/2022 0400 by Basia Mtz RN  Body Position:   turned   right   side-lying  Taken 9/10/2022 0200 by Basia Mtz RN  Body Position:   turned    left   side-lying  Taken 9/10/2022 0000 by Basia Mtz RN  Body Position:   turned   right   side-lying  Taken 9/9/2022 2200 by Basia Mtz RN  Body Position:   turned   left   side-lying  Taken 9/9/2022 2000 by Basia Mtz RN  Body Position:   turned   right   side-lying     Problem: Restraint, Nonbehavioral (Nonviolent)  Goal: Absence of Harm or Injury  Outcome: Ongoing, Progressing  Intervention: Implement Least Restrictive Safety Strategies  Flowsheets  Taken 9/10/2022 0600  Medical Device Protection: tubing secured  Less Restrictive Alternative:   bed alarm in use   calming techniques promoted   coping techniques promoted  De-Escalation Techniques:   increased round frequency   medication administered  Diversional Activities: television  Taken 9/10/2022 0400  Medical Device Protection: tubing secured  Less Restrictive Alternative:   bed alarm in use   calming techniques promoted   coping techniques promoted  De-Escalation Techniques:   increased round frequency   medication administered  Diversional Activities: television  Taken 9/10/2022 0205  Diversional Activities: television  Taken 9/10/2022 0200  Medical Device Protection: tubing secured  Less Restrictive Alternative:   bed alarm in use   calming techniques promoted   coping techniques promoted  De-Escalation Techniques:   increased round frequency   medication administered  Diversional Activities: television  Taken 9/10/2022 0000  Medical Device Protection: tubing secured  Less Restrictive Alternative:   bed alarm in use   calming techniques promoted   coping techniques promoted  De-Escalation Techniques:   increased round frequency   medication administered  Diversional Activities: television  Taken 9/9/2022 2200  Medical Device Protection: tubing secured  Less Restrictive Alternative:   bed alarm in use   calming techniques promoted   coping techniques promoted  De-Escalation Techniques:   increased round frequency   medication  administered  Diversional Activities: television  Taken 9/9/2022 2005  Diversional Activities: television  Taken 9/9/2022 2000  Medical Device Protection: tubing secured  Less Restrictive Alternative:   bed alarm in use   calming techniques promoted   coping techniques promoted  De-Escalation Techniques:   increased round frequency   medication administered  Diversional Activities: television  Intervention: Protect Dignity, Rights, and Personal Wellbeing  Flowsheets  Taken 9/10/2022 0205  Trust Relationship/Rapport:   care explained   reassurance provided  Taken 9/9/2022 2005  Trust Relationship/Rapport:   care explained   reassurance provided  Intervention: Protect Skin and Joint Integrity  Flowsheets  Taken 9/10/2022 0600 by Hannah Chapman RN  Body Position:   left   turned   side-lying  Taken 9/10/2022 0400 by Basia Mtz RN  Body Position:   turned   right   side-lying  Taken 9/10/2022 0200 by Basia Mtz RN  Body Position:   turned   left   side-lying  Taken 9/10/2022 0000 by Basia Mtz RN  Body Position:   turned   right   side-lying  Taken 9/9/2022 2200 by Basia Mtz RN  Body Position:   turned   left   side-lying  Taken 9/9/2022 2000 by Basia Mtz RN  Body Position:   turned   right   side-lying     Problem: Restraint, Nonbehavioral (Nonviolent)  Goal: Absence of Harm or Injury  Intervention: Implement Least Restrictive Safety Strategies  Flowsheets  Taken 9/10/2022 0600  Medical Device Protection: tubing secured  Less Restrictive Alternative:   bed alarm in use   calming techniques promoted   coping techniques promoted  De-Escalation Techniques:   increased round frequency   medication administered  Diversional Activities: television  Taken 9/10/2022 0400  Medical Device Protection: tubing secured  Less Restrictive Alternative:   bed alarm in use   calming techniques promoted   coping techniques promoted  De-Escalation Techniques:   increased round frequency   medication  administered  Diversional Activities: television  Taken 9/10/2022 0205  Diversional Activities: television  Taken 9/10/2022 0200  Medical Device Protection: tubing secured  Less Restrictive Alternative:   bed alarm in use   calming techniques promoted   coping techniques promoted  De-Escalation Techniques:   increased round frequency   medication administered  Diversional Activities: television  Taken 9/10/2022 0000  Medical Device Protection: tubing secured  Less Restrictive Alternative:   bed alarm in use   calming techniques promoted   coping techniques promoted  De-Escalation Techniques:   increased round frequency   medication administered  Diversional Activities: television  Taken 9/9/2022 2200  Medical Device Protection: tubing secured  Less Restrictive Alternative:   bed alarm in use   calming techniques promoted   coping techniques promoted  De-Escalation Techniques:   increased round frequency   medication administered  Diversional Activities: television  Taken 9/9/2022 2005  Diversional Activities: television  Taken 9/9/2022 2000  Medical Device Protection: tubing secured  Less Restrictive Alternative:   bed alarm in use   calming techniques promoted   coping techniques promoted  De-Escalation Techniques:   increased round frequency   medication administered  Diversional Activities: television   Goal Outcome Evaluation:  Plan of Care Reviewed With: patient        Progress: no change

## 2022-09-10 NOTE — CONSULTS
Teleneurology Phone Consultation Note    Date of Consultation: 9/10/2022  Requesting Attending: Freddie Nava MD  Chief Complaint: Altered mental status, seizures  Location of patient: ICU  Date/Time Teleneurology doc on phone: 9/10/2022 10 am ET    Interval changes:   Patient still sedates/intubated. Per nurse, she became agitated overnight and was noted to move all extremities. No seizures witnessed.    Impression:   - Polysubstance intoxication  - Toxic/metabolic encephalopathy  - Acute provoked seizure    Recommendation:   - Continue Keppra 750 mg bid  - Will continue to follow    Desire Mendoza MD  If you have any questions about the patient recommendations, please call [phone] at anytime and ask to speak with the neurologist on call. Press 1 for an emergent consult and 2 for a non-emergent consult.    I, Desire Mendoza MD, was consulted about this patient on 9/10/2022 for discussion over the phone regarding management of the patient's care via a provider to provider discussion. The location of the patient was at [facility] My location was [place]. The recommendations are based off information I received from the consulting provider.

## 2022-09-10 NOTE — PROGRESS NOTES
Physician Assistant - Brief Progress Note  PERMANENT  09/10/2022 07:17    [facility] [place] [facility] [place])    RAVI SEO    Date of Service 09/10/2022 07:17    HPI/Events of Note Rn reporting AM blood glucose was 63 and pt receiving an amp of D50 now; requesting D50 order and hypoglycemic protocol order.  Pt currently not on any scheduled insulin and was started on tube feeds around 0200 today.  Since tube   feeds just started, would continue to titrate as tolerated and closely monitor BG.  D50/hypoglycemia protocol orders placed in epic and plan d/w bedside RNVeronica.      Interventions Intermediate-Communication with other healthcare providers and/or family, Other: hypoglycemia        Electronically Signed by: Jamshid Grant (PAC) on 09/10/2022 07:17

## 2022-09-10 NOTE — PLAN OF CARE
Goal Outcome Evaluation:           Progress: improving  Outcome Evaluation: Intubated sedated on propofol and fentanyl, Versed titrated off.  Not following commands. Tolerating TF,  VSS  Problem: Skin Injury Risk Increased  Goal: Skin Health and Integrity  Outcome: Ongoing, Progressing     Problem: Fall Injury Risk  Goal: Absence of Fall and Fall-Related Injury  Outcome: Ongoing, Progressing     Problem: Adjustment to Illness (Overdose)  Goal: Optimal Coping  Outcome: Ongoing, Progressing     Problem: Bleeding (Overdose)  Goal: Absence of Bleeding  Outcome: Ongoing, Progressing     Problem: Dysrhythmia (Overdose)  Goal: Stable Heart Rate and Rhythm  Outcome: Ongoing, Progressing     Problem: Fluid Imbalance (Overdose)  Goal: Fluid Balance  Outcome: Ongoing, Progressing     Problem: Hemodynamic Instability (Overdose)  Goal: Effective Tissue Perfusion  Outcome: Ongoing, Progressing     Problem: Neurologic Impairment (Overdose)  Goal: Optimal Neurologic Function  Outcome: Ongoing, Progressing     Problem: Respiratory Compromise (Overdose)  Goal: Effective Oxygenation and Ventilation  Outcome: Ongoing, Progressing     Problem: Communication Impairment (Mechanical Ventilation, Invasive)  Goal: Effective Communication  Outcome: Ongoing, Progressing     Problem: Device-Related Complication Risk (Mechanical Ventilation, Invasive)  Goal: Optimal Device Function  Outcome: Ongoing, Progressing     Problem: Inability to Wean (Mechanical Ventilation, Invasive)  Goal: Mechanical Ventilation Liberation  Outcome: Ongoing, Progressing     Problem: Nutrition Impairment (Mechanical Ventilation, Invasive)  Goal: Optimal Nutrition Delivery  Outcome: Ongoing, Progressing     Problem: Skin and Tissue Injury (Mechanical Ventilation, Invasive)  Goal: Absence of Device-Related Skin and Tissue Injury  Outcome: Ongoing, Progressing

## 2022-09-10 NOTE — PROGRESS NOTES
" LOS: 2 days     Chief Complaint:  Pulmonology is following for ventilator management    Subjective     Interval History:     Remains intubated   Overnight events reviewed     Review of Systems:     Unable to obtain review of systems due to intubation and sedation.       Objective     Vital Signs  /81   Pulse 92   Temp 99.1 °F (37.3 °C) (Oral)   Resp 25   Ht 167.6 cm (65.98\")   Wt 109 kg (240 lb 8.4 oz)   LMP  (LMP Unknown)   SpO2 100%   BMI 38.84 kg/m²      Physical Exam:  Physical exam limited due to telemedicine  General:   intubated. Sedated.   HENT: normocephalic. Atraumatic.   Cardiac: normal rate and rhythm noted on telemetry  Lungs:  on ventilator support. compliant with current settings.   Musculoskeletal: no significant deformity, joint abnormality  Neurologic: sedated                  Results Review:   I reviewed the patient's new clinical results.  I reviewed the patient's new imaging results and agree with the interpretation.        CBC      CBC 9/8/22 9/9/22 9/10/22   WBC 22.57 (A) 6.48 6.20   RBC 4.14 2.93 (A) 3.11 (A)   Hemoglobin 11.5 (A) 8.9 (A) 9.0 (A)   Hematocrit 35.8 26.7 (A) 28.4 (A)   MCV 86.5 91.1 91.3   MCH 27.8 30.4 28.9   MCHC 32.1 33.3 31.7   RDW 13.2 14.0 14.5   Platelets 266 166 165   (A) Abnormal value                  CMP      CMP 9/8/22 9/9/22 9/10/22 9/10/22      0111 1409   Glucose 173 (A) 84 71    BUN 8 6 5 (A)    Creatinine 0.71 0.59 0.46 (A)    Sodium 139 136 139    Potassium 3.3 (A) 3.8 3.6 4.0   Chloride 104 108 (A) 109 (A)    Calcium 8.9 7.8 (A) 7.6 (A)    Albumin 4.31 2.50 (A) 2.61 (A)    Total Bilirubin 0.4 0.2 0.2    Alkaline Phosphatase 91 61 63    AST (SGOT) 47 (A) 10 81 (A)    ALT (SGPT) 26 <5 37 (A)    (A) Abnormal value         Comments are available for some flowsheets but are not being displayed.                 Site   Date Value Ref Range Status   09/10/2022 Right Brachial  Final     Misael's Test   Date Value Ref Range Status   09/10/2022 Positive  " Final     pH, Arterial   Date Value Ref Range Status   09/10/2022 7.376 7.350 - 7.450 pH units Final     pCO2, Arterial   Date Value Ref Range Status   09/10/2022 35.1 35.0 - 45.0 mm Hg Final     pO2, Arterial   Date Value Ref Range Status   09/10/2022 101.0 83.0 - 108.0 mm Hg Final     Comment:     83 Value above reference range     HCO3, Arterial   Date Value Ref Range Status   09/10/2022 20.6 20.0 - 26.0 mmol/L Final     Base Excess, Arterial   Date Value Ref Range Status   09/10/2022 -4.1 (L) 0.0 - 2.0 mmol/L Final     O2 Saturation, Arterial   Date Value Ref Range Status   09/10/2022 98.7 94.0 - 99.0 % Final     Hemoglobin, Blood Gas   Date Value Ref Range Status   09/10/2022 9.6 (L) 13.5 - 17.5 g/dL Final     Comment:     84 Value below reference range     Hematocrit, Blood Gas   Date Value Ref Range Status   09/10/2022 29.5 (L) 38.0 - 51.0 % Final     Comment:     84 Value below reference range     Oxyhemoglobin   Date Value Ref Range Status   09/10/2022 97.0 94 - 99 % Final     Methemoglobin   Date Value Ref Range Status   09/10/2022 0.50 0.00 - 3.00 % Final     Carboxyhemoglobin   Date Value Ref Range Status   09/10/2022 1.2 0 - 5 % Final     CO2 Content   Date Value Ref Range Status   09/10/2022 21.6 (L) 22 - 33 mmol/L Final     Barometric Pressure for Blood Gas   Date Value Ref Range Status   09/10/2022 728 mmHg Final     Modality   Date Value Ref Range Status   09/10/2022 Ventilator  Final     FIO2   Date Value Ref Range Status   09/10/2022 40 % Final           Medication Review:   Scheduled Medications:  ALPRAZolam, 2 mg, Oral, Q8H  chlorhexidine, 15 mL, Mouth/Throat, Q12H  ethyl alcohol, 1 application, Nasal, BID  famotidine, 20 mg, Intravenous, Q12H  gabapentin, 800 mg, Oral, TID  heparin (porcine), 5,000 Units, Subcutaneous, Q8H  levETIRAcetam, 750 mg, Intravenous, Q12H  piperacillin-tazobactam, 3.375 g, Intravenous, Q8H  sodium chloride, 10 mL, Intravenous, Q12H      Continuous infusions:  fentaNYL  Citrate,   LORazepam, 0.5-10 mg/hr, Last Rate: Stopped (09/09/22 1435)  Midazolam 1 mg/mL 100mL NS, 1-10 mg/hr, Last Rate: 2 mg/hr (09/10/22 1342)  norepinephrine, 0.02-0.3 mcg/kg/min, Last Rate: Stopped (09/08/22 1401)  propofol, 5-60 mcg/kg/min, Last Rate: 35 mcg/kg/min (09/10/22 0901)  sodium chloride, 100 mL/hr, Last Rate: 100 mL/hr (09/10/22 1341)        Assessment:   Acute respiratory failure, inability to protect airway related to overdose  Overdose of unknown substance, notable for possession of gabapentin tablets on ED admission  Urine toxicology positive for multiple substances: Methamphetamines, amphetamines, benzodiazepines, methadone  Bilateral lower lobe pneumonia, likely aspiration  Hepatitis C  Mild normocytic anemia  History of anxiety, fibromyalgia  History of drug abuse        Plan:   NSedation: Propofol, fentanyl  Sedation adjusted for Rass goal of 0 to -1  UDS notable for amphetamine, methamphetamine, benzodiazepines, methadone.  Notable for possession of gabapentin tablets on admission.  On [facility]  Neurology following.  EEG negative for ongoing seizure activity.  Cont scheduled Xanax.  On scheduled gabapentin  Agree with plans to lower sedation and attempt SBT/SBT with possible extubation if tolerated.  RN slowly titrated down/off sedation.    Map goal 65 mmHg or greater.     FiO2 (%):  [35 %-40 %] 35 %  S RR:  [25] 25  PEEP/CPAP (cm H2O):  [5 cm H20] 5 cm H20  MAP (cm H2O):  [9.9-11] 10  Ventilator settings and graphics reviewed.  Appears compliant with current settings.  Recent ABG reviewed.  Site   Date Value Ref Range Status   09/10/2022 Right Brachial  Final     Misael's Test   Date Value Ref Range Status   09/10/2022 Positive  Final     pH, Arterial   Date Value Ref Range Status   09/10/2022 7.376 7.350 - 7.450 pH units Final     pCO2, Arterial   Date Value Ref Range Status   09/10/2022 35.1 35.0 - 45.0 mm Hg Final     pO2, Arterial   Date Value Ref Range Status   09/10/2022 101.0 83.0 -  108.0 mm Hg Final     Comment:     83 Value above reference range     HCO3, Arterial   Date Value Ref Range Status   09/10/2022 20.6 20.0 - 26.0 mmol/L Final     Base Excess, Arterial   Date Value Ref Range Status   09/10/2022 -4.1 (L) 0.0 - 2.0 mmol/L Final     O2 Saturation, Arterial   Date Value Ref Range Status   09/10/2022 98.7 94.0 - 99.0 % Final     Hemoglobin, Blood Gas   Date Value Ref Range Status   09/10/2022 9.6 (L) 13.5 - 17.5 g/dL Final     Comment:     84 Value below reference range     Hematocrit, Blood Gas   Date Value Ref Range Status   09/10/2022 29.5 (L) 38.0 - 51.0 % Final     Comment:     84 Value below reference range     Oxyhemoglobin   Date Value Ref Range Status   09/10/2022 97.0 94 - 99 % Final     Methemoglobin   Date Value Ref Range Status   09/10/2022 0.50 0.00 - 3.00 % Final     Carboxyhemoglobin   Date Value Ref Range Status   09/10/2022 1.2 0 - 5 % Final     CO2 Content   Date Value Ref Range Status   09/10/2022 21.6 (L) 22 - 33 mmol/L Final     Barometric Pressure for Blood Gas   Date Value Ref Range Status   09/10/2022 728 mmHg Final     Modality   Date Value Ref Range Status   09/10/2022 Ventilator  Final     FIO2   Date Value Ref Range Status   09/10/2022 40 % Final       Titrating settings as tolerated.  SAT today d/w nurse  MRSA PCR positive.  Imaging notable for bibasilar infiltrates, concern for aspiration in the setting of recent seizure  On IV Zosyn    GI prophylaxis: Famotidine  Nutrition: Tube feeds    Antibiotics: IV Zosyn  Some concern for aspiration due to recent altered mentation/seizure activity prior to admission  Cultures:   Microbiology Results (last 10 days)       Procedure Component Value - Date/Time    MRSA Screen, PCR (Inpatient) - Swab, Nares [[id number]]  (Abnormal) Collected: 09/08/22 1027    Lab Status: Final result Specimen: Swab from Nares Updated: 09/08/22 1314     MRSA PCR MRSA Detected    Narrative:      The negative predictive value of this diagnostic  test is high and should only be used to consider de-escalating anti-MRSA therapy. A positive result may indicate colonization with MRSA and must be correlated clinically.    COVID PRE-OP / PRE-PROCEDURE SCREENING ORDER (NO ISOLATION) - Swab, Nasopharynx [[id number]]  (Normal) Collected: 09/08/22 0212    Lab Status: Final result Specimen: Swab from Nasopharynx Updated: 09/08/22 0234    Narrative:      The following orders were created for panel order COVID PRE-OP / PRE-PROCEDURE SCREENING ORDER (NO ISOLATION) - Swab, Nasopharynx.  Procedure                               Abnormality         Status                     ---------                               -----------         ------                     COVID-19 and FLU A/B PCR...[[id number]]  Normal              Final result                 Please view results for these tests on the individual orders.    COVID-19 and FLU A/B PCR - Swab, Nasopharynx [[id number]]  (Normal) Collected: 09/08/22 0212    Lab Status: Final result Specimen: Swab from Nasopharynx Updated: 09/08/22 0234     COVID19 Not Detected     Influenza A PCR Not Detected     Influenza B PCR Not Detected    Narrative:      Fact sheet for providers: https://www.fda.gov/media/266636/download    Fact sheet for patients: https://www.fda.gov/media/418672/download    Test performed by PCR.    Blood Culture - Blood, Blood, Venous Line [[id number]]  (Abnormal) Collected: 09/08/22 0205    Lab Status: Final result Specimen: Blood, Venous Line Updated: 09/10/22 0619     Blood Culture Corynebacterium species     Isolated from Anaerobic Bottle     Gram Stain Anaerobic Bottle Gram positive bacilli    Narrative:      Probable contaminant requires clinical correlation, susceptibility not performed unless requested by physician.    Blood culture does not meet the specified criteria for PCR identification.  If pregnant, immunocompromised, or clinical concern for meningitis, call lab to run BCID for Listeria monocytogenes.     Blood Culture ID, PCR - Blood, Blood, Venous Line [[id number]]  (Normal) Collected: 09/08/22 0205    Lab Status: Final result Specimen: Blood, Venous Line Updated: 09/09/22 1801     BCID, PCR Negative by BCID PCR. Culture to Follow.    Blood Culture - Blood, Blood, Central Line [[id number]]  (Normal) Collected: 09/08/22 0129    Lab Status: Preliminary result Specimen: Blood, Central Line Updated: 09/10/22 0146     Blood Culture No growth at 2 days          DVT prophylaxis: Heparin subcutaneous        Remains critically ill with hypoxic respiratory failure     Kyle Blackburn MD  09/10/22  14:53 EDT    Case d/w nurse and team   This service is provided via audio video tele medicine   I am in [place] and patient is in [facility]

## 2022-09-10 NOTE — PROGRESS NOTES
Reevaluated patient twice this afternoon. Initial evaluation patient's mother bedside. Updated her on plan of care. Second time primary nurse bedside. We have been able to wean patient off versed and are starting on propofol/fentanyl. Patient still minimally responsive but has been moving more per nursing staff. Nursing staff to continue weaning sedation this evening and tonight with possible extubation soon pending reasonable mental status.

## 2022-09-10 NOTE — PROGRESS NOTES
[facility] HOSPITALIST PROGRESS NOTE     Patient Identification:  Name:  Lillian Ayon  Age:  36 y.o.  Sex:  female  :  1985  MRN:  [id number]  Visit Number:  [id number]  ROOM: [address]     Primary Care Provider:  Jason Mathew MD    Length of stay in inpatient status:  2    Subjective     Chief Compliant:    Chief Complaint   Patient presents with   • Drug Overdose       Subjective:  Patient intubated and sedated. Patient comfortable and not interactive on my exam. Sedation has been significantly decreased since yesterday but patient still not waking up. This morning she was on 35 of propofol, 5 of versed, 200 of fentanyl. Will wean off versed first then work on propofol and fentanyl. Will reevaluate when patient wakes up for possible extubation.       ROS:   Otherwise a 10 point ROS reviewed and negative other than per subjective.     Objective     Current Hospital Meds:ALPRAZolam, 2 mg, Oral, Q8H  chlorhexidine, 15 mL, Mouth/Throat, Q12H  ethyl alcohol, 1 application, Nasal, BID  famotidine, 20 mg, Intravenous, Q12H  gabapentin, 800 mg, Oral, TID  heparin (porcine), 5,000 Units, Subcutaneous, Q8H  levETIRAcetam, 750 mg, Intravenous, Q12H  piperacillin-tazobactam, 3.375 g, Intravenous, Q8H  sodium chloride, 10 mL, Intravenous, Q12H    fentaNYL Citrate,   LORazepam, 0.5-10 mg/hr, Last Rate: Stopped (22 1435)  Midazolam 1 mg/mL 100mL NS, 1-10 mg/hr, Last Rate: 5 mg/hr (09/10/22 0952)  norepinephrine, 0.02-0.3 mcg/kg/min, Last Rate: Stopped (22 1401)  propofol, 5-60 mcg/kg/min, Last Rate: 35 mcg/kg/min (09/10/22 0901)  sodium chloride, 150 mL/hr, Last Rate: 150 mL/hr (09/10/22 0422)        Current Antimicrobial Therapy:  Anti-Infectives (From admission, onward)    Ordered     Dose/Rate Route Frequency Start Stop    22 1216  piperacillin-tazobactam (ZOSYN) IVPB 3.375 g in 100 mL NS VTB        Note to Pharmacy: Pt previously tolerated cefepime. And Zosyn   Ordering  Provider: Freddie Nava MD    3.375 g  over 4 Hours Intravenous Every 8 Hours 09/08/22 2000 09/15/22 1959    09/08/22 1216  piperacillin-tazobactam (ZOSYN) IVPB 3.375 g in 100 mL NS VTB        Ordering Provider: Freddie Nava MD    3.375 g  over 30 Minutes Intravenous Once 09/08/22 1315 09/08/22 1521    09/08/22 0824  cefepime (MAXIPIME) 2 g/100 mL 0.9% NS (mbp)        Ordering Provider: Freddie Nava MD    2 g  200 mL/hr over 30 Minutes Intravenous Once 09/08/22 0900 09/08/22 0947    09/08/22 0626  metroNIDAZOLE (FLAGYL) IVPB 500 mg        Ordering Provider: Jareth Mendez MD    500 mg  over 30 Minutes Intravenous Once 09/08/22 0628 09/08/22 0710    09/07/22 2233  cefepime (MAXIPIME) 2 g/100 mL 0.9% NS (mbp)        Ordering Provider: Damaris Escalante DO    2 g  200 mL/hr over 30 Minutes Intravenous Once 09/07/22 2235 09/08/22 0246        Current Diuretic Therapy:  Diuretics (From admission, onward)    None        ----------------------------------------------------------------------------------------------------------------------  Vital Signs:  Temp:  [98.2 °F (36.8 °C)-99.6 °F (37.6 °C)] 99.4 °F (37.4 °C)  Heart Rate:  [70-91] 87  Resp:  [22-25] 25  BP: ()/(41-84) 113/73  FiO2 (%):  [35 %-40 %] 35 %  SpO2:  [96 %-100 %] 100 %  on   ;   Device (Oxygen Therapy): ventilator  Body mass index is 38.84 kg/m².    Wt Readings from Last 3 Encounters:   09/10/22 109 kg (240 lb 8.4 oz)   06/26/22 99.8 kg (220 lb)   04/28/22 90.7 kg (200 lb)     Intake & Output (last 3 days)       09/07 0701  09/08 0700 09/08 0701  09/09 0700 09/09 0701  09/10 0700 09/10 0701 09/11 0700    I.V. (mL/kg)  5658 (53.4) 3837.2 (35.2)     Other   100     NG/GT   88     IV Piggyback 2100 100 600     Total Intake(mL/kg) 2100 (21) 5758 (54.3) 4625.2 (42.4)     Urine (mL/kg/hr) 500 3650 (1.4) 1025 (0.4)     Stool  0 0     Total Output 500 3650 1025     Net +1600 +2108 +3600.2                 NPO Diet NPO Type: Strict  NPO  ----------------------------------------------------------------------------------------------------------------------  Physical exam:  GENERAL:  Patient intubated and sedated.   SKIN:  Warm, dry without rashes, purpura or petechiae. Multiple tattoos.   EYES:  Conjunctivae normal.   HEAD:  Normocephalic. Atraumatic.   EARS/NOSE/MOUTH/THROAT:  Septum midline.  No excoriations or nasal discharge. No stomatitis or ulcers.  Lips normal. Oropharynx without lesions or exudates.  NECK:  Supple with good range of motion; no thyromegaly or masses  LYMPHATICS:  No cervical, supraclavicular, axillary adenopathy.  RESP:  Lungs clear to auscultation. Good airflow. Intubated receiving mechanical ventilation.   CARDIAC:  Regular rate and rhythm without murmurs, rubs or gallops. Normal S1,S2. No edema  GI:  Soft, nontender, no hepatosplenomegaly, normal bowel sounds  MSK:  No clubbing or cyanosis, No joint swelling noted in hands  NEUROLOGICAL:  No focal deficit. Pupils equal and reactive.   ----------------------------------------------------------------------------------------------------------------------    --------------------------------------------------------------------------------------------------  Tele:    ----------------------------------------------------------------------------------------------------------------------  Results from last 7 days   Lab Units 09/10/22  0111 09/09/22  0042 09/08/22  0003   LACTATE mmol/L  --   --  1.8   WBC 10*3/mm3 6.20 6.48 22.57*   HEMOGLOBIN g/dL 9.0* 8.9* 11.5*   HEMATOCRIT % 28.4* 26.7* 35.8   MCV fL 91.3 91.1 86.5   MCHC g/dL 31.7 33.3 32.1   PLATELETS 10*3/mm3 165 166 266   INR   --   --  1.02     Results from last 7 days   Lab Units 09/10/22  0503   PH, ARTERIAL pH units 7.376   PO2 ART mm Hg 101.0   PCO2, ARTERIAL mm Hg 35.1   HCO3 ART mmol/L 20.6     Results from last 7 days   Lab Units 09/10/22  0111 09/09/22  0042 09/08/22  1005 09/08/22  0003   SODIUM mmol/L 139 136   --  139   POTASSIUM mmol/L 3.6 3.8  --  3.3*   MAGNESIUM mg/dL  --   --  2.2 2.2   CHLORIDE mmol/L 109* 108*  --  104   CO2 mmol/L 18.8* 19.6*  --  22.1   BUN mg/dL 5* 6  --  8   CREATININE mg/dL 0.46* 0.59  --  0.71   CALCIUM mg/dL 7.6* 7.8*  --  8.9   PHOSPHORUS mg/dL  --   --   --  2.6   GLUCOSE mg/dL 71 84  --  173*   ALBUMIN g/dL 2.61* 2.50*  --  4.31   BILIRUBIN mg/dL 0.2 0.2  --  0.4   ALK PHOS U/L 63 61  --  91   AST (SGOT) U/L 81* 10  --  47*   ALT (SGPT) U/L 37* <5  --  26   Estimated Creatinine Clearance: 215.5 mL/min (A) (by C-G formula based on SCr of 0.46 mg/dL (L)).  Ammonia   Date Value Ref Range Status   09/08/2022 25 11 - 51 umol/L Final     Results from last 7 days   Lab Units 09/10/22  0111 09/09/22  0042 09/08/22  1005 09/08/22  0003   CK TOTAL U/L 4,694* 7,811* 8,122* 1,775*   TROPONIN T ng/mL  --   --   --  <0.010             Glucose   Date/Time Value Ref Range Status   09/10/2022 0802 93 70 - 130 mg/dL Final     Comment:     Meter: [id number] : [id number] RENÉE ONEILL   09/10/2022 0654 64 (L) 70 - 130 mg/dL Final     Comment:     Meter: [id number] : [id number] leti montague   09/08/2022 0229 143 (H) 70 - 130 mg/dL Final     Comment:     RN Notified Meter: [id number] : [id number] brian johnson   09/07/2022 2258 224 (H) 70 - 130 mg/dL Final     Comment:     RN Notified Meter: [id number] : [id number] brian johnson     Lab Results   Component Value Date    TSH 0.492 09/08/2022     Lab Results   Component Value Date    PREGTESTUR Negative 09/08/2022     Pain Management Panel     Pain Management Panel Latest Ref Rng & Units 9/8/2022 5/25/2021    AMPHETAMINES SCREEN, URINE Negative Positive(A) Positive(A)    BARBITURATES SCREEN Negative Negative Negative    BENZODIAZEPINE SCREEN, URINE Negative Positive(A) Negative    BUPRENORPHINEUR Negative Negative Negative    COCAINE SCREEN, URINE Negative Negative Negative    METHADONE SCREEN, URINE Negative Positive(A) Negative     METHAMPHETAMINEUR Negative Positive(A) -        Brief Urine Lab Results  (Last result in the past 365 days)      Color   Clarity   Blood   Leuk Est   Nitrite   Protein   CREAT   Urine HCG        09/08/22 0112 Yellow   Cloudy   Large (3+)   Negative   Negative   Trace           09/08/22 0112               Negative           Blood Culture   Date Value Ref Range Status   09/08/2022 Corynebacterium species (C)  Final   09/08/2022 No growth at 2 days  Preliminary     No results found for: URINECX  No results found for: WOUNDCX  No results found for: STOOLCX  No results found for: RESPCX  No results found for: AFBCX  Results from last 7 days   Lab Units 09/08/22  0003   PROCALCITONIN ng/mL 0.06   LACTATE mmol/L 1.8       I have personally looked at the labs and they are summarized above.  ----------------------------------------------------------------------------------------------------------------------  Detailed radiology reports for the last 24 hours:    Imaging Results (Last 24 Hours)     ** No results found for the last 24 hours. **        Assessment & Plan      #Acute respiratory failure 2/2 drug overdose   #Aspiration pneumonia  - Intubated in emergency room for airway protection   - CT chest/abdomen/pelvis/head unrevealing other than bibasilar infiltrates likely from aspiration   - Transition to zosyn as cefepime can lower seizure threshold. Mild allergy to cephalosporins listed but tolerated cefepime in ED.   - Will follow respiratory culture and blood cultures   - Patient with significant benzo/opiate tolerance requiring higher than usual doses of sedation.   - Currently tolerating sedation wean well. Will wean versed first then propofol/fentanyl. On low vent settings. If patient wakes up and is compliant with weaning sedation may be able to extubated in near future.    - Would consider seroquel but QTC prolonged   - Continue scheduled PO ativan to help prevent benzo withdrawal as we wean sedation   -  Pulmonology following. Appreciate recs.      #Precedex induced bradycardia   - EKG NSR, nonspecific T-wave abnormality   - Echo revealed normal LVEF and no significant valvular abnromalities.   - Will avoid precedex and monitor on tele      #Polysubstance drug use   - UDS positive for benzodiazepine, methamphetamine, methadone. There is also concern for gabapentin misuse and overdose.      #Elevated CK  - Preserved renal function  - CK: 1775=>8122=>7811=>4694  - Now that CK is under 5,000, I think development of pigment induced renal injury unlikely.   - Decrease fluids to 100 cc/hr   - Repeat CK one more time in AM.      #Reported seizure and active jerking/monoclonus   - Suspect from gabapentin overdose or possibly benzo withdrawal   - Loaded on keppra in ED. Will continue 750 IV BID keppra for now.   - STAT EEG showed nonspecific moderate degree slowing but no evidence of epileptic activity   - Tele neuro following. Appreciate recs.     #Hypoglycemia  - BG down to 63 this AM. Possibly from NPO status. Tube feedings started, will continue to monitor with hypoglycemia protocol.      #Positive blood culture   - 1/2 cultures on admission growing gram positive bacilli that BCID identified as corynebacterium   - Suspect contaminant. Will repeat blood cultures. Will continue Zosyn as above.     #Hx of hepatitis C  #Mild hepatocellular transaminitis   - Ammonia level nml. No history of cirrhosis. Do not suspect hepatic encephalopathy at this point   - Family reported they believe she is s/p treatment for the hepatitis C.   - Repeat hepatitis panel still positive for hepatitis C Ab. Outpatient viral load may be warranted depending on prior treatments.   - HIV screen negative     #Acute vs. Chronic normocytic anemia  - Baseline hgb appears to be 11-13  - Hgb at presentaiton 11.5=>9.0  - No active signs of bleeidng   - Will get iron profile, vitamin B12 and folate levels.      #Tobacco use  - Recommend cessation   - Will  offer NRT      F: NPO, Tfs ordered  A: Fentanyl   S: Propofol, versed  T: Heparin SubQ  H: HOB elevated  U: Famotadine   G: PRN  S: Daily  B: PRN  I: ETT 9/8. PICC line 9/8, vlad MIRZA   D: Zosyn      Code status: Full. Patient's NOK is her mother, Yessy, [phone].     Dispo: Continue CCU level care      40 minutes of critical care spent on the care of patient. Patient critically ill requiring mechanical ventilation. 50% of time bedside or discussing care with family.        VTE Prophylaxis:   Mechanical Order History:     None      Pharmalogical Order History:      Ordered     Dose Route Frequency Stop    09/08/22 0906  heparin (porcine) 5000 UNIT/ML injection 5,000 Units         5,000 Units SC Every 8 Hours Scheduled --                Freddie Nava MD  [facility]  09/10/22  11:07 EDT

## 2022-09-11 ENCOUNTER — APPOINTMENT (OUTPATIENT)
Dept: GENERAL RADIOLOGY | Facility: HOSPITAL | Age: 37
End: 2022-09-11

## 2022-09-11 LAB
ALBUMIN SERPL-MCNC: 2.54 G/DL (ref 3.5–5.2)
ALBUMIN/GLOB SERPL: 0.9 G/DL
ALP SERPL-CCNC: 59 U/L (ref 39–117)
ALT SERPL W P-5'-P-CCNC: <5 U/L (ref 1–33)
ANION GAP SERPL CALCULATED.3IONS-SCNC: 9.5 MMOL/L (ref 5–15)
AST SERPL-CCNC: 53 U/L (ref 1–32)
BASOPHILS # BLD AUTO: 0.02 10*3/MM3 (ref 0–0.2)
BASOPHILS NFR BLD AUTO: 0.3 % (ref 0–1.5)
BILIRUB SERPL-MCNC: 0.2 MG/DL (ref 0–1.2)
BUN SERPL-MCNC: 4 MG/DL (ref 6–20)
BUN/CREAT SERPL: 10.5 (ref 7–25)
CALCIUM SPEC-SCNC: 8.2 MG/DL (ref 8.6–10.5)
CHLORIDE SERPL-SCNC: 104 MMOL/L (ref 98–107)
CK SERPL-CCNC: 2278 U/L (ref 20–180)
CO2 SERPL-SCNC: 22.5 MMOL/L (ref 22–29)
CREAT SERPL-MCNC: 0.38 MG/DL (ref 0.57–1)
DEPRECATED RDW RBC AUTO: 44.6 FL (ref 37–54)
EGFRCR SERPLBLD CKD-EPI 2021: 135 ML/MIN/1.73
EOSINOPHIL # BLD AUTO: 0.23 10*3/MM3 (ref 0–0.4)
EOSINOPHIL NFR BLD AUTO: 3.7 % (ref 0.3–6.2)
ERYTHROCYTE [DISTWIDTH] IN BLOOD BY AUTOMATED COUNT: 14 % (ref 12.3–15.4)
FOLATE SERPL-MCNC: 11.6 NG/ML (ref 4.78–24.2)
GLOBULIN UR ELPH-MCNC: 2.8 GM/DL
GLUCOSE SERPL-MCNC: 119 MG/DL (ref 65–99)
HCT VFR BLD AUTO: 26.3 % (ref 34–46.6)
HGB BLD-MCNC: 8.8 G/DL (ref 12–15.9)
IMM GRANULOCYTES # BLD AUTO: 0.02 10*3/MM3 (ref 0–0.05)
IMM GRANULOCYTES NFR BLD AUTO: 0.3 % (ref 0–0.5)
IRON 24H UR-MRATE: 22 MCG/DL (ref 37–145)
IRON SATN MFR SERPL: 10 % (ref 20–50)
LYMPHOCYTES # BLD AUTO: 1.73 10*3/MM3 (ref 0.7–3.1)
LYMPHOCYTES NFR BLD AUTO: 27.6 % (ref 19.6–45.3)
MCH RBC QN AUTO: 29.5 PG (ref 26.6–33)
MCHC RBC AUTO-ENTMCNC: 33.5 G/DL (ref 31.5–35.7)
MCV RBC AUTO: 88.3 FL (ref 79–97)
MONOCYTES # BLD AUTO: 0.43 10*3/MM3 (ref 0.1–0.9)
MONOCYTES NFR BLD AUTO: 6.9 % (ref 5–12)
NEUTROPHILS NFR BLD AUTO: 3.83 10*3/MM3 (ref 1.7–7)
NEUTROPHILS NFR BLD AUTO: 61.2 % (ref 42.7–76)
NRBC BLD AUTO-RTO: 0 /100 WBC (ref 0–0.2)
PLATELET # BLD AUTO: 171 10*3/MM3 (ref 140–450)
PMV BLD AUTO: 8.7 FL (ref 6–12)
POTASSIUM SERPL-SCNC: 3.5 MMOL/L (ref 3.5–5.2)
PROT SERPL-MCNC: 5.3 G/DL (ref 6–8.5)
RBC # BLD AUTO: 2.98 10*6/MM3 (ref 3.77–5.28)
SODIUM SERPL-SCNC: 136 MMOL/L (ref 136–145)
TIBC SERPL-MCNC: 216 MCG/DL (ref 298–536)
TRANSFERRIN SERPL-MCNC: 145 MG/DL (ref 200–360)
VIT B12 BLD-MCNC: 588 PG/ML (ref 211–946)
WBC NRBC COR # BLD: 6.26 10*3/MM3 (ref 3.4–10.8)

## 2022-09-11 PROCEDURE — 71045 X-RAY EXAM CHEST 1 VIEW: CPT

## 2022-09-11 PROCEDURE — 82607 VITAMIN B-12: CPT | Performed by: INTERNAL MEDICINE

## 2022-09-11 PROCEDURE — 82746 ASSAY OF FOLIC ACID SERUM: CPT | Performed by: INTERNAL MEDICINE

## 2022-09-11 PROCEDURE — 25010000002 PIPERACILLIN SOD-TAZOBACTAM PER 1 G: Performed by: INTERNAL MEDICINE

## 2022-09-11 PROCEDURE — 25010000002 PROPOFOL 10 MG/ML EMULSION: Performed by: INTERNAL MEDICINE

## 2022-09-11 PROCEDURE — 25010000002 LORAZEPAM PER 2 MG: Performed by: INTERNAL MEDICINE

## 2022-09-11 PROCEDURE — 80053 COMPREHEN METABOLIC PANEL: CPT | Performed by: INTERNAL MEDICINE

## 2022-09-11 PROCEDURE — 82550 ASSAY OF CK (CPK): CPT | Performed by: INTERNAL MEDICINE

## 2022-09-11 PROCEDURE — 99233 SBSQ HOSP IP/OBS HIGH 50: CPT | Performed by: INTERNAL MEDICINE

## 2022-09-11 PROCEDURE — 83540 ASSAY OF IRON: CPT | Performed by: INTERNAL MEDICINE

## 2022-09-11 PROCEDURE — 94799 UNLISTED PULMONARY SVC/PX: CPT

## 2022-09-11 PROCEDURE — 99291 CRITICAL CARE FIRST HOUR: CPT | Performed by: INTERNAL MEDICINE

## 2022-09-11 PROCEDURE — 85025 COMPLETE CBC W/AUTO DIFF WBC: CPT | Performed by: INTERNAL MEDICINE

## 2022-09-11 PROCEDURE — 94003 VENT MGMT INPAT SUBQ DAY: CPT

## 2022-09-11 PROCEDURE — 94761 N-INVAS EAR/PLS OXIMETRY MLT: CPT

## 2022-09-11 PROCEDURE — 25010000002 HEPARIN (PORCINE) PER 1000 UNITS: Performed by: INTERNAL MEDICINE

## 2022-09-11 PROCEDURE — 25010000002 LEVETRIRACETAM PER 10 MG: Performed by: INTERNAL MEDICINE

## 2022-09-11 PROCEDURE — 84466 ASSAY OF TRANSFERRIN: CPT | Performed by: INTERNAL MEDICINE

## 2022-09-11 RX ORDER — LORAZEPAM 2 MG/ML
2 INJECTION INTRAMUSCULAR
Status: DISCONTINUED | OUTPATIENT
Start: 2022-09-11 | End: 2022-09-13

## 2022-09-11 RX ORDER — FENTANYL CITRATE/PF 100MCG/2ML
SYRINGE (ML) INTRAVENOUS
Status: DISPENSED | OUTPATIENT
Start: 2022-09-11 | End: 2022-09-14

## 2022-09-11 RX ORDER — POTASSIUM CHLORIDE 1.5 G/1.77G
40 POWDER, FOR SOLUTION ORAL EVERY 4 HOURS
Status: COMPLETED | OUTPATIENT
Start: 2022-09-11 | End: 2022-09-11

## 2022-09-11 RX ORDER — ALPRAZOLAM 1 MG/1
2 TABLET ORAL EVERY 6 HOURS
Status: DISCONTINUED | OUTPATIENT
Start: 2022-09-11 | End: 2022-09-12

## 2022-09-11 RX ADMIN — LORAZEPAM 2 MG: 2 INJECTION INTRAMUSCULAR; INTRAVENOUS at 19:33

## 2022-09-11 RX ADMIN — PIPERACILLIN SODIUM AND TAZOBACTAM SODIUM 3.38 G: 3; .375 INJECTION, POWDER, LYOPHILIZED, FOR SOLUTION INTRAVENOUS at 21:06

## 2022-09-11 RX ADMIN — FAMOTIDINE 20 MG: 10 INJECTION, SOLUTION INTRAVENOUS at 21:06

## 2022-09-11 RX ADMIN — PROPOFOL 60 MCG/KG/MIN: 10 INJECTION, EMULSION INTRAVENOUS at 06:22

## 2022-09-11 RX ADMIN — Medication 10 ML: at 09:13

## 2022-09-11 RX ADMIN — PROPOFOL 35 MCG/KG/MIN: 10 INJECTION, EMULSION INTRAVENOUS at 13:23

## 2022-09-11 RX ADMIN — POTASSIUM CHLORIDE 40 MEQ: 1.5 POWDER, FOR SOLUTION ORAL at 13:23

## 2022-09-11 RX ADMIN — FAMOTIDINE 20 MG: 10 INJECTION, SOLUTION INTRAVENOUS at 09:13

## 2022-09-11 RX ADMIN — Medication: at 13:20

## 2022-09-11 RX ADMIN — CHLORHEXIDINE GLUCONATE 15 ML: 1.2 RINSE ORAL at 09:13

## 2022-09-11 RX ADMIN — PROPOFOL 50 MCG/KG/MIN: 10 INJECTION, EMULSION INTRAVENOUS at 21:42

## 2022-09-11 RX ADMIN — SODIUM CHLORIDE 75 ML/HR: 9 INJECTION, SOLUTION INTRAVENOUS at 17:05

## 2022-09-11 RX ADMIN — CHLORHEXIDINE GLUCONATE 15 ML: 1.2 RINSE ORAL at 21:07

## 2022-09-11 RX ADMIN — ETHYL ALCOHOL 1 EACH: 62 SWAB TOPICAL at 09:14

## 2022-09-11 RX ADMIN — PROPOFOL 55 MCG/KG/MIN: 10 INJECTION, EMULSION INTRAVENOUS at 10:19

## 2022-09-11 RX ADMIN — HEPARIN SODIUM 5000 UNITS: 5000 INJECTION INTRAVENOUS; SUBCUTANEOUS at 05:24

## 2022-09-11 RX ADMIN — ALPRAZOLAM 2 MG: 1 TABLET ORAL at 21:06

## 2022-09-11 RX ADMIN — SODIUM CHLORIDE 100 ML/HR: 9 INJECTION, SOLUTION INTRAVENOUS at 02:24

## 2022-09-11 RX ADMIN — HEPARIN SODIUM 5000 UNITS: 5000 INJECTION INTRAVENOUS; SUBCUTANEOUS at 21:07

## 2022-09-11 RX ADMIN — Medication: at 05:25

## 2022-09-11 RX ADMIN — PIPERACILLIN SODIUM AND TAZOBACTAM SODIUM 3.38 G: 3; .375 INJECTION, POWDER, LYOPHILIZED, FOR SOLUTION INTRAVENOUS at 15:11

## 2022-09-11 RX ADMIN — Medication 10 ML: at 21:06

## 2022-09-11 RX ADMIN — ALPRAZOLAM 2 MG: 1 TABLET ORAL at 05:25

## 2022-09-11 RX ADMIN — GABAPENTIN 800 MG: 400 CAPSULE ORAL at 17:05

## 2022-09-11 RX ADMIN — ALPRAZOLAM 2 MG: 1 TABLET ORAL at 14:50

## 2022-09-11 RX ADMIN — LORAZEPAM 4 MG: 2 INJECTION INTRAMUSCULAR; INTRAVENOUS at 07:34

## 2022-09-11 RX ADMIN — LEVETIRACETAM 750 MG: 100 INJECTION INTRAVENOUS at 09:17

## 2022-09-11 RX ADMIN — LEVETIRACETAM 750 MG: 100 INJECTION INTRAVENOUS at 21:06

## 2022-09-11 RX ADMIN — PROPOFOL 50 MCG/KG/MIN: 10 INJECTION, EMULSION INTRAVENOUS at 18:38

## 2022-09-11 RX ADMIN — LORAZEPAM 4 MG: 2 INJECTION INTRAMUSCULAR; INTRAVENOUS at 05:00

## 2022-09-11 RX ADMIN — PROPOFOL 40 MCG/KG/MIN: 10 INJECTION, EMULSION INTRAVENOUS at 01:48

## 2022-09-11 RX ADMIN — GABAPENTIN 800 MG: 400 CAPSULE ORAL at 21:06

## 2022-09-11 RX ADMIN — POTASSIUM CHLORIDE 40 MEQ: 1.5 POWDER, FOR SOLUTION ORAL at 09:12

## 2022-09-11 RX ADMIN — ETHYL ALCOHOL 1 EACH: 62 SWAB TOPICAL at 21:07

## 2022-09-11 RX ADMIN — LORAZEPAM 4 MG: 2 INJECTION INTRAMUSCULAR; INTRAVENOUS at 14:46

## 2022-09-11 RX ADMIN — GABAPENTIN 800 MG: 400 CAPSULE ORAL at 10:00

## 2022-09-11 RX ADMIN — HEPARIN SODIUM 5000 UNITS: 5000 INJECTION INTRAVENOUS; SUBCUTANEOUS at 14:02

## 2022-09-11 RX ADMIN — PIPERACILLIN SODIUM AND TAZOBACTAM SODIUM 3.38 G: 3; .375 INJECTION, POWDER, LYOPHILIZED, FOR SOLUTION INTRAVENOUS at 04:36

## 2022-09-11 RX ADMIN — Medication: at 20:54

## 2022-09-11 NOTE — PLAN OF CARE
Problem: Adult Inpatient Plan of Care  Goal: Plan of Care Review  Outcome: Ongoing, Progressing  Flowsheets  Taken 9/11/2022 0508  Progress: no change  Taken 9/9/2022 0602  Plan of Care Reviewed With: patient  Goal: Patient-Specific Goal (Individualized)  Outcome: Ongoing, Progressing  Goal: Absence of Hospital-Acquired Illness or Injury  Outcome: Ongoing, Progressing  Intervention: Identify and Manage Fall Risk  Flowsheets  Taken 9/11/2022 0500  Safety Promotion/Fall Prevention:   safety round/check completed   clutter free environment maintained   fall prevention program maintained  Taken 9/11/2022 0400  Safety Promotion/Fall Prevention:   safety round/check completed   clutter free environment maintained   fall prevention program maintained  Taken 9/11/2022 0300  Safety Promotion/Fall Prevention:   safety round/check completed   clutter free environment maintained   fall prevention program maintained  Taken 9/11/2022 0200  Safety Promotion/Fall Prevention:   safety round/check completed   clutter free environment maintained   fall prevention program maintained  Taken 9/11/2022 0100  Safety Promotion/Fall Prevention:   safety round/check completed   clutter free environment maintained   fall prevention program maintained  Taken 9/11/2022 0000  Safety Promotion/Fall Prevention:   safety round/check completed   clutter free environment maintained   fall prevention program maintained  Taken 9/10/2022 2300  Safety Promotion/Fall Prevention:   safety round/check completed   clutter free environment maintained   fall prevention program maintained  Taken 9/10/2022 2200  Safety Promotion/Fall Prevention:   safety round/check completed   clutter free environment maintained   fall prevention program maintained  Taken 9/10/2022 2100  Safety Promotion/Fall Prevention:   safety round/check completed   clutter free environment maintained   fall prevention program maintained  Taken 9/10/2022 2000  Safety Promotion/Fall  Prevention:   safety round/check completed   clutter free environment maintained   fall prevention program maintained  Taken 9/10/2022 1900  Safety Promotion/Fall Prevention:   safety round/check completed   clutter free environment maintained   fall prevention program maintained  Intervention: Prevent Skin Injury  Flowsheets  Taken 9/11/2022 0400  Body Position:   turned   right   side-lying  Taken 9/11/2022 0205  Skin Protection:   adhesive use limited   pulse oximeter probe site changed   tubing/devices free from skin contact  Taken 9/11/2022 0200  Body Position:   turned   left   side-lying  Taken 9/11/2022 0000  Body Position:   turned   right   side-lying  Taken 9/10/2022 2200  Body Position:   turned   left   side-lying  Taken 9/10/2022 2005  Skin Protection:   adhesive use limited   pulse oximeter probe site changed   tubing/devices free from skin contact  Taken 9/10/2022 2000  Body Position:   turned   right   side-lying  Intervention: Prevent and Manage VTE (Venous Thromboembolism) Risk  Flowsheets  Taken 9/11/2022 0205  Activity Management: bedrest  VTE Prevention/Management: (see MAR) other (see comments)  Range of Motion: ROM (range of motion) performed  Taken 9/10/2022 2005  Activity Management: bedrest  VTE Prevention/Management: (see MAR) other (see comments)  Range of Motion: ROM (range of motion) performed  Intervention: Prevent Infection  Flowsheets  Taken 9/11/2022 0400  Infection Prevention:   environmental surveillance performed   hand hygiene promoted   rest/sleep promoted   single patient room provided  Taken 9/11/2022 0200  Infection Prevention:   environmental surveillance performed   hand hygiene promoted   rest/sleep promoted   single patient room provided  Taken 9/11/2022 0000  Infection Prevention:   environmental surveillance performed   hand hygiene promoted   rest/sleep promoted   single patient room provided  Taken 9/10/2022 2200  Infection Prevention:   environmental surveillance  performed   hand hygiene promoted   rest/sleep promoted   single patient room provided  Taken 9/10/2022 2000  Infection Prevention:   environmental surveillance performed   hand hygiene promoted   rest/sleep promoted   single patient room provided  Goal: Optimal Comfort and Wellbeing  Outcome: Ongoing, Progressing  Intervention: Monitor Pain and Promote Comfort  Flowsheets  Taken 9/11/2022 0500  Pain Management Interventions: see MAR  Taken 9/10/2022 2250  Pain Management Interventions: see MAR  Taken 9/10/2022 2245  Pain Management Interventions: see MAR  Taken 9/10/2022 2005  Pain Management Interventions: see MAR  Intervention: Provide Person-Centered Care  Flowsheets  Taken 9/11/2022 0205  Trust Relationship/Rapport:   care explained   reassurance provided  Taken 9/10/2022 2005  Trust Relationship/Rapport:   care explained   reassurance provided  Goal: Readiness for Transition of Care  Outcome: Ongoing, Progressing     Problem: Skin Injury Risk Increased  Goal: Skin Health and Integrity  Outcome: Ongoing, Progressing  Intervention: Optimize Skin Protection  Flowsheets  Taken 9/11/2022 0400  Head of Bed (HOB) Positioning:   HOB elevated   HOB at 30-45 degrees  Taken 9/11/2022 0205  Pressure Reduction Techniques:   heels elevated off bed   pressure points protected   weight shift assistance provided  Pressure Reduction Devices:   heel offloading device utilized   positioning supports utilized   pressure-redistributing mattress utilized  Skin Protection:   adhesive use limited   pulse oximeter probe site changed   tubing/devices free from skin contact  Taken 9/11/2022 0200  Head of Bed (HOB) Positioning:   HOB elevated   HOB at 30-45 degrees  Taken 9/11/2022 0000  Head of Bed (HOB) Positioning:   HOB elevated   HOB at 30-45 degrees  Taken 9/10/2022 2200  Head of Bed (HOB) Positioning:   HOB elevated   HOB at 30-45 degrees  Taken 9/10/2022 2005  Pressure Reduction Techniques:   heels elevated off bed   pressure  points protected   weight shift assistance provided  Pressure Reduction Devices:   heel offloading device utilized   positioning supports utilized   pressure-redistributing mattress utilized  Skin Protection:   adhesive use limited   pulse oximeter probe site changed   tubing/devices free from skin contact  Taken 9/10/2022 2000  Head of Bed (HOB) Positioning:   HOB elevated   HOB at 30-45 degrees     Problem: Fall Injury Risk  Goal: Absence of Fall and Fall-Related Injury  Outcome: Ongoing, Progressing  Intervention: Identify and Manage Contributors  Flowsheets  Taken 9/11/2022 0400  Medication Review/Management: medications reviewed  Taken 9/11/2022 0200  Medication Review/Management: medications reviewed  Taken 9/11/2022 0000  Medication Review/Management: medications reviewed  Taken 9/10/2022 2200  Medication Review/Management: medications reviewed  Taken 9/10/2022 2000  Medication Review/Management: medications reviewed  Intervention: Promote Injury-Free Environment  Flowsheets  Taken 9/11/2022 0500  Safety Promotion/Fall Prevention:   safety round/check completed   clutter free environment maintained   fall prevention program maintained  Taken 9/11/2022 0400  Safety Promotion/Fall Prevention:   safety round/check completed   clutter free environment maintained   fall prevention program maintained  Taken 9/11/2022 0300  Safety Promotion/Fall Prevention:   safety round/check completed   clutter free environment maintained   fall prevention program maintained  Taken 9/11/2022 0200  Safety Promotion/Fall Prevention:   safety round/check completed   clutter free environment maintained   fall prevention program maintained  Taken 9/11/2022 0100  Safety Promotion/Fall Prevention:   safety round/check completed   clutter free environment maintained   fall prevention program maintained  Taken 9/11/2022 0000  Safety Promotion/Fall Prevention:   safety round/check completed   clutter free environment maintained   fall  prevention program maintained  Taken 9/10/2022 2300  Safety Promotion/Fall Prevention:   safety round/check completed   clutter free environment maintained   fall prevention program maintained  Taken 9/10/2022 2200  Safety Promotion/Fall Prevention:   safety round/check completed   clutter free environment maintained   fall prevention program maintained  Taken 9/10/2022 2100  Safety Promotion/Fall Prevention:   safety round/check completed   clutter free environment maintained   fall prevention program maintained  Taken 9/10/2022 2000  Safety Promotion/Fall Prevention:   safety round/check completed   clutter free environment maintained   fall prevention program maintained  Taken 9/10/2022 1900  Safety Promotion/Fall Prevention:   safety round/check completed   clutter free environment maintained   fall prevention program maintained     Problem: Adjustment to Illness (Overdose)  Goal: Optimal Coping  Outcome: Ongoing, Progressing  Intervention: Support Psychosocial Response to Acute Illness  Flowsheets  Taken 9/11/2022 0205 by Basia Mtz RN  Supportive Measures: relaxation techniques promoted  Taken 9/10/2022 2005 by Basia Mtz RN  Supportive Measures: relaxation techniques promoted  Taken 9/10/2022 1400 by Sherin Purcell RN  Family/Support System Care: support provided     Problem: Bleeding (Overdose)  Goal: Absence of Bleeding  Outcome: Ongoing, Progressing  Intervention: Manage Signs of Bleeding  Flowsheets (Taken 9/11/2022 0205)  Bleeding Precautions: blood pressure closely monitored     Problem: Dysrhythmia (Overdose)  Goal: Stable Heart Rate and Rhythm  Outcome: Ongoing, Progressing     Problem: Fluid Imbalance (Overdose)  Goal: Fluid Balance  Outcome: Ongoing, Progressing  Intervention: Monitor and Manage Fluid and Electrolyte Balance  Flowsheets  Taken 9/11/2022 0205  Fluid/Electrolyte Management: fluids provided  Taken 9/10/2022 2005  Fluid/Electrolyte Management: fluids provided      Problem: Hemodynamic Instability (Overdose)  Goal: Effective Tissue Perfusion  Outcome: Ongoing, Progressing     Problem: Neurologic Impairment (Overdose)  Goal: Optimal Neurologic Function  Outcome: Ongoing, Progressing  Intervention: Protect and Optimize Cerebral Perfusion  Flowsheets  Taken 9/11/2022 0205  Sensory Stimulation Regulation: quiet environment promoted  Cerebral Perfusion Promotion: blood pressure monitored  Taken 9/10/2022 2005  Sensory Stimulation Regulation:   care clustered   quiet environment promoted  Cerebral Perfusion Promotion: blood pressure monitored     Problem: Respiratory Compromise (Overdose)  Goal: Effective Oxygenation and Ventilation  Outcome: Ongoing, Progressing  Intervention: Optimize Oxygenation and Ventilation  Flowsheets  Taken 9/11/2022 0205  Airway/Ventilation Management: airway patency maintained  Cough And Deep Breathing: unable to perform  Taken 9/10/2022 2005  Airway/Ventilation Management: airway patency maintained  Cough And Deep Breathing: unable to perform     Problem: Communication Impairment (Mechanical Ventilation, Invasive)  Goal: Effective Communication  Outcome: Ongoing, Progressing  Intervention: Ensure Effective Communication  Flowsheets  Taken 9/11/2022 0205  Communication Enhancement Strategies: extra time allowed for response  Taken 9/10/2022 2005  Communication Enhancement Strategies: extra time allowed for response     Problem: Device-Related Complication Risk (Mechanical Ventilation, Invasive)  Goal: Optimal Device Function  Outcome: Ongoing, Progressing  Intervention: Optimize Device Care and Function  Flowsheets  Taken 9/11/2022 0400 by Basia Mtz, RN  Airway Safety Measures:   manual resuscitator/mask at bedside   suction at bedside  Taken 9/11/2022 0200 by Basia Mtz, RN  Airway Safety Measures:   manual resuscitator/mask at bedside   suction at bedside  Taken 9/11/2022 0000 by Basia Mtz, RN  Airway Safety Measures:   manual  resuscitator/mask at bedside   suction at bedside  Taken 9/10/2022 2200 by Basia Mtz, RN  Airway Safety Measures:   manual resuscitator/mask at bedside   suction at bedside  Taken 9/10/2022 2000 by Basia Mtz, RN  Airway Safety Measures:   manual resuscitator/mask at bedside   suction at bedside  Taken 9/10/2022 0800 by Sherin Purcell RN  Aspiration Precautions: tube feeding placement verified     Problem: Inability to Wean (Mechanical Ventilation, Invasive)  Goal: Mechanical Ventilation Liberation  Outcome: Ongoing, Progressing  Intervention: Promote Extubation and Mechanical Ventilation Liberation  Flowsheets  Taken 9/11/2022 0400  Medication Review/Management: medications reviewed  Taken 9/11/2022 0205  Environmental Support: calm environment promoted  Taken 9/11/2022 0200  Medication Review/Management: medications reviewed  Taken 9/11/2022 0000  Medication Review/Management: medications reviewed  Taken 9/10/2022 2200  Medication Review/Management: medications reviewed  Taken 9/10/2022 2005  Environmental Support: calm environment promoted  Taken 9/10/2022 2000  Medication Review/Management: medications reviewed     Problem: Nutrition Impairment (Mechanical Ventilation, Invasive)  Goal: Optimal Nutrition Delivery  Outcome: Ongoing, Progressing     Problem: Skin and Tissue Injury (Mechanical Ventilation, Invasive)  Goal: Absence of Device-Related Skin and Tissue Injury  Outcome: Ongoing, Progressing  Intervention: Maintain Skin and Tissue Health  Flowsheets  Taken 9/11/2022 0205  Device Skin Pressure Protection: absorbent pad utilized/changed  Taken 9/10/2022 2005  Device Skin Pressure Protection: absorbent pad utilized/changed     Problem: Ventilator-Induced Lung Injury (Mechanical Ventilation, Invasive)  Goal: Absence of Ventilator-Induced Lung Injury  Outcome: Ongoing, Progressing  Intervention: Prevent Ventilator-Associated Pneumonia  Flowsheets  Taken 9/11/2022 0400  Head of Bed (HOB)  Positioning:   HOB elevated   HOB at 30-45 degrees  Oral Care:   lip/mouth moisturizer applied   swabbed with antiseptic solution  Taken 9/11/2022 0205  VAP Prevention Bundle: HOB elevation maintained  Taken 9/11/2022 0200  Head of Bed (HOB) Positioning:   HOB elevated   HOB at 30-45 degrees  Taken 9/11/2022 0000  Head of Bed (HOB) Positioning:   HOB elevated   HOB at 30-45 degrees  Oral Care:   lip/mouth moisturizer applied   swabbed with antiseptic solution  Taken 9/10/2022 2200  Head of Bed (HOB) Positioning:   HOB elevated   HOB at 30-45 degrees  Taken 9/10/2022 2005  VAP Prevention Bundle: HOB elevation maintained  Taken 9/10/2022 2000  Head of Bed (HOB) Positioning:   HOB elevated   HOB at 30-45 degrees  Oral Care:   lip/mouth moisturizer applied   swabbed with antiseptic solution   tongue brushed     Problem: Restraint, Nonbehavioral (Nonviolent)  Goal: Absence of Harm or Injury  Outcome: Ongoing, Progressing  Intervention: Implement Least Restrictive Safety Strategies  Flowsheets  Taken 9/11/2022 0400  Medical Device Protection: tubing secured  Less Restrictive Alternative:   bed alarm in use   calming techniques promoted   coping techniques promoted  De-Escalation Techniques:   increased round frequency   medication administered  Diversional Activities: television  Taken 9/11/2022 0205  Diversional Activities: television  Taken 9/11/2022 0200  Medical Device Protection: tubing secured  Less Restrictive Alternative:   bed alarm in use   calming techniques promoted   coping techniques promoted  De-Escalation Techniques:   increased round frequency   medication administered  Diversional Activities: television  Taken 9/11/2022 0000  Medical Device Protection: tubing secured  Less Restrictive Alternative:   bed alarm in use   calming techniques promoted   coping techniques promoted  De-Escalation Techniques:   increased round frequency   medication administered  Diversional Activities: television  Taken  9/10/2022 2200  Medical Device Protection: tubing secured  Less Restrictive Alternative:   bed alarm in use   calming techniques promoted   coping techniques promoted  De-Escalation Techniques:   increased round frequency   medication administered  Diversional Activities: television  Taken 9/10/2022 2005  Diversional Activities: television  Taken 9/10/2022 2000  Medical Device Protection: tubing secured  Less Restrictive Alternative:   bed alarm in use   calming techniques promoted   coping techniques promoted  De-Escalation Techniques:   increased round frequency   medication administered  Diversional Activities: television  Intervention: Protect Dignity, Rights, and Personal Wellbeing  Flowsheets  Taken 9/11/2022 0205  Trust Relationship/Rapport:   care explained   reassurance provided  Taken 9/10/2022 2005  Trust Relationship/Rapport:   care explained   reassurance provided  Intervention: Protect Skin and Joint Integrity  Flowsheets  Taken 9/11/2022 0400  Body Position:   turned   right   side-lying  Taken 9/11/2022 0205  Range of Motion: ROM (range of motion) performed  Taken 9/11/2022 0200  Body Position:   turned   left   side-lying  Taken 9/11/2022 0000  Body Position:   turned   right   side-lying  Taken 9/10/2022 2200  Body Position:   turned   left   side-lying  Taken 9/10/2022 2005  Range of Motion: ROM (range of motion) performed  Taken 9/10/2022 2000  Body Position:   turned   right   side-lying  Goal: Absence of Harm or Injury  Outcome: Ongoing, Progressing  Intervention: Implement Least Restrictive Safety Strategies  Recent Flowsheet Documentation  Taken 9/11/2022 0400 by Basia Mtz RN  Medical Device Protection: tubing secured  Less Restrictive Alternative:   bed alarm in use   calming techniques promoted   coping techniques promoted  De-Escalation Techniques:   increased round frequency   medication administered  Diversional Activities: television  Taken 9/11/2022 0205 by Basia Mtz  RN  Diversional Activities: television  Taken 9/11/2022 0200 by Basia Mtz RN  Medical Device Protection: tubing secured  Less Restrictive Alternative:   bed alarm in use   calming techniques promoted   coping techniques promoted  De-Escalation Techniques:   increased round frequency   medication administered  Diversional Activities: television  Taken 9/11/2022 0000 by Basia Mtz RN  Medical Device Protection: tubing secured  Less Restrictive Alternative:   bed alarm in use   calming techniques promoted   coping techniques promoted  De-Escalation Techniques:   increased round frequency   medication administered  Diversional Activities: television  Taken 9/10/2022 2200 by Basia Mtz RN  Medical Device Protection: tubing secured  Less Restrictive Alternative:   bed alarm in use   calming techniques promoted   coping techniques promoted  De-Escalation Techniques:   increased round frequency   medication administered  Diversional Activities: television  Taken 9/10/2022 2005 by Basia Mtz RN  Diversional Activities: television  Taken 9/10/2022 2000 by Basia Mtz RN  Medical Device Protection: tubing secured  Less Restrictive Alternative:   bed alarm in use   calming techniques promoted   coping techniques promoted  De-Escalation Techniques:   increased round frequency   medication administered  Diversional Activities: television  Intervention: Protect Dignity, Rights, and Personal Wellbeing  Recent Flowsheet Documentation  Taken 9/11/2022 0205 by Basia Mtz RN  Trust Relationship/Rapport:   care explained   reassurance provided  Taken 9/10/2022 2005 by Basia Mtz RN  Trust Relationship/Rapport:   care explained   reassurance provided  Intervention: Protect Skin and Joint Integrity  Flowsheets  Taken 9/11/2022 0400  Body Position:   turned   right   side-lying  Taken 9/11/2022 0205  Range of Motion: ROM (range of motion) performed  Taken 9/11/2022 0200  Body Position:    turned   left   side-lying  Taken 9/11/2022 0000  Body Position:   turned   right   side-lying  Taken 9/10/2022 2200  Body Position:   turned   left   side-lying  Taken 9/10/2022 2005  Range of Motion: ROM (range of motion) performed  Taken 9/10/2022 2000  Body Position:   turned   right   side-lying   Goal Outcome Evaluation:  Plan of Care Reviewed With: patient        Progress: no change

## 2022-09-11 NOTE — PROGRESS NOTES
Patient had large emesis and significant secretions. Sedation almost weaned off but patient also became agitated. Not following commands. Will sedate again with propofol and fentanyl with plan to try SBT again tomorrow. I am starting to suspect event that led to seizure activity was less likely overdose but benzo withdrawal. Nursing staff noting patient is much more comfortable after getting xanex but not lasting until next dose and has got a total of 8 mg IV ativan today. Will increase xanax from Q8hrs to Q6hrs and decrease PRN ativan to 2 mg from 4 mg.

## 2022-09-11 NOTE — PROGRESS NOTES
" LOS: 3 days     Chief Complaint:  Pulmonology is following for ventilator management    Subjective     Interval History:   Remains sedated and remains intubated   Titrated sedation down   Vent settings adjusted   Review of Systems:     Unable to obtain review of systems due to intubation and sedation.       Objective     Vital Signs  /81   Pulse 78   Temp 98.8 °F (37.1 °C) (Oral)   Resp 25   Ht 167.6 cm (65.98\")   Wt 110 kg (242 lb 8.1 oz)   LMP  (LMP Unknown)   SpO2 94%   BMI 39.16 kg/m²      Physical Exam:  No changes   Physical exam limited due to telemedicine  General:   intubated. Sedated.   HENT: normocephalic. Atraumatic.   Cardiac: normal rate and rhythm noted on telemetry  Lungs:  on ventilator support. compliant with current settings.   Musculoskeletal: no significant deformity, joint abnormality  Neurologic: sedated                  Results Review:   I reviewed the patient's new clinical results.  I reviewed the patient's new imaging results and agree with the interpretation.        CBC      CBC 9/9/22 9/10/22 9/11/22   WBC 6.48 6.20 6.26   RBC 2.93 (A) 3.11 (A) 2.98 (A)   Hemoglobin 8.9 (A) 9.0 (A) 8.8 (A)   Hematocrit 26.7 (A) 28.4 (A) 26.3 (A)   MCV 91.1 91.3 88.3   MCH 30.4 28.9 29.5   MCHC 33.3 31.7 33.5   RDW 14.0 14.5 14.0   Platelets 166 165 171   (A) Abnormal value                  CMP      CMP 9/9/22 9/10/22 9/10/22 9/11/22     0111 1409    Glucose 84 71  119 (A)   BUN 6 5 (A)  4 (A)   Creatinine 0.59 0.46 (A)  0.38 (A)   Sodium 136 139  136   Potassium 3.8 3.6 4.0 3.5   Chloride 108 (A) 109 (A)  104   Calcium 7.8 (A) 7.6 (A)  8.2 (A)   Albumin 2.50 (A) 2.61 (A)  2.54 (A)   Total Bilirubin 0.2 0.2  0.2   Alkaline Phosphatase 61 63  59   AST (SGOT) 10 81 (A)  53 (A)   ALT (SGPT) <5 37 (A)  <5   (A) Abnormal value         Comments are available for some flowsheets but are not being displayed.                 Site   Date Value Ref Range Status   09/10/2022 Right Brachial  Final "     Misael's Test   Date Value Ref Range Status   09/10/2022 Positive  Final     pH, Arterial   Date Value Ref Range Status   09/10/2022 7.376 7.350 - 7.450 pH units Final     pCO2, Arterial   Date Value Ref Range Status   09/10/2022 35.1 35.0 - 45.0 mm Hg Final     pO2, Arterial   Date Value Ref Range Status   09/10/2022 101.0 83.0 - 108.0 mm Hg Final     Comment:     83 Value above reference range     HCO3, Arterial   Date Value Ref Range Status   09/10/2022 20.6 20.0 - 26.0 mmol/L Final     Base Excess, Arterial   Date Value Ref Range Status   09/10/2022 -4.1 (L) 0.0 - 2.0 mmol/L Final     O2 Saturation, Arterial   Date Value Ref Range Status   09/10/2022 98.7 94.0 - 99.0 % Final     Hemoglobin, Blood Gas   Date Value Ref Range Status   09/10/2022 9.6 (L) 13.5 - 17.5 g/dL Final     Comment:     84 Value below reference range     Hematocrit, Blood Gas   Date Value Ref Range Status   09/10/2022 29.5 (L) 38.0 - 51.0 % Final     Comment:     84 Value below reference range     Oxyhemoglobin   Date Value Ref Range Status   09/10/2022 97.0 94 - 99 % Final     Methemoglobin   Date Value Ref Range Status   09/10/2022 0.50 0.00 - 3.00 % Final     Carboxyhemoglobin   Date Value Ref Range Status   09/10/2022 1.2 0 - 5 % Final     CO2 Content   Date Value Ref Range Status   09/10/2022 21.6 (L) 22 - 33 mmol/L Final     Barometric Pressure for Blood Gas   Date Value Ref Range Status   09/10/2022 728 mmHg Final     Modality   Date Value Ref Range Status   09/10/2022 Ventilator  Final     FIO2   Date Value Ref Range Status   09/10/2022 40 % Final           Medication Review:   Scheduled Medications:  ALPRAZolam, 2 mg, Oral, Q8H  chlorhexidine, 15 mL, Mouth/Throat, Q12H  ethyl alcohol, 1 application, Nasal, BID  famotidine, 20 mg, Intravenous, Q12H  gabapentin, 800 mg, Oral, TID  heparin (porcine), 5,000 Units, Subcutaneous, Q8H  levETIRAcetam, 750 mg, Intravenous, Q12H  piperacillin-tazobactam, 3.375 g, Intravenous, Q8H  sodium  chloride, 10 mL, Intravenous, Q12H      Continuous infusions:  LORazepam, 0.5-10 mg/hr, Last Rate: Stopped (09/09/22 1435)  Midazolam 1 mg/mL 100mL NS, 1-10 mg/hr, Last Rate: Stopped (09/10/22 1551)  norepinephrine, 0.02-0.3 mcg/kg/min, Last Rate: Stopped (09/08/22 1401)  propofol, 5-60 mcg/kg/min, Last Rate: 35 mcg/kg/min (09/11/22 1323)  sodium chloride, 75 mL/hr, Last Rate: 75 mL/hr (09/11/22 0914)        Assessment:   Acute respiratory failure, inability to protect airway related to overdose  Overdose of unknown substance, notable for possession of gabapentin tablets on ED admission  Urine toxicology positive for multiple substances: Methamphetamines, amphetamines, benzodiazepines, methadone  Bilateral lower lobe pneumonia, likely aspiration  Hepatitis C  Mild normocytic anemia  History of anxiety, fibromyalgia  History of drug abuse        Plan:   NSedation: Propofol, fentanyl  Sedation adjusted for Rass goal of 0 to -1  UDS notable for amphetamine, methamphetamine, benzodiazepines, methadone.  Notable for possession of gabapentin tablets on admission.  On [facility]  Neurology following.  EEG negative for ongoing seizure activity.  Cont scheduled Xanax.    Agree with plans to lower sedation and attempt SBT/SBT with possible extubation if tolerated.  RN slowly titrated down/off sedation.    Map goal 65 mmHg or greater.     FiO2 (%):  [35 %] 35 %  S RR:  [25] 25  PEEP/CPAP (cm H2O):  [5 cm H20] 5 cm H20  MAP (cm H2O):  [10-19] 13  Ventilator settings and graphics reviewed.  Appears compliant with current settings.  Recent ABG reviewed.  Site   Date Value Ref Range Status   09/10/2022 Right Brachial  Final     Misael's Test   Date Value Ref Range Status   09/10/2022 Positive  Final     pH, Arterial   Date Value Ref Range Status   09/10/2022 7.376 7.350 - 7.450 pH units Final     pCO2, Arterial   Date Value Ref Range Status   09/10/2022 35.1 35.0 - 45.0 mm Hg Final     pO2, Arterial   Date Value Ref Range Status    09/10/2022 101.0 83.0 - 108.0 mm Hg Final     Comment:     83 Value above reference range     HCO3, Arterial   Date Value Ref Range Status   09/10/2022 20.6 20.0 - 26.0 mmol/L Final     Base Excess, Arterial   Date Value Ref Range Status   09/10/2022 -4.1 (L) 0.0 - 2.0 mmol/L Final     O2 Saturation, Arterial   Date Value Ref Range Status   09/10/2022 98.7 94.0 - 99.0 % Final     Hemoglobin, Blood Gas   Date Value Ref Range Status   09/10/2022 9.6 (L) 13.5 - 17.5 g/dL Final     Comment:     84 Value below reference range     Hematocrit, Blood Gas   Date Value Ref Range Status   09/10/2022 29.5 (L) 38.0 - 51.0 % Final     Comment:     84 Value below reference range     Oxyhemoglobin   Date Value Ref Range Status   09/10/2022 97.0 94 - 99 % Final     Methemoglobin   Date Value Ref Range Status   09/10/2022 0.50 0.00 - 3.00 % Final     Carboxyhemoglobin   Date Value Ref Range Status   09/10/2022 1.2 0 - 5 % Final     CO2 Content   Date Value Ref Range Status   09/10/2022 21.6 (L) 22 - 33 mmol/L Final     Barometric Pressure for Blood Gas   Date Value Ref Range Status   09/10/2022 728 mmHg Final     Modality   Date Value Ref Range Status   09/10/2022 Ventilator  Final     FIO2   Date Value Ref Range Status   09/10/2022 40 % Final       Titrating settings as tolerated.  SAT today d/w nurse  MRSA PCR positive.  Imaging notable for bibasilar infiltrates, concern for aspiration in the setting of recent seizure  On IV Zosyn    GI prophylaxis: Famotidine  Nutrition: Tube feeds    Antibiotics: IV Zosyn  Some concern for aspiration due to recent altered mentation/seizure activity prior to admission  Cultures:   Microbiology Results (last 10 days)       Procedure Component Value - Date/Time    MRSA Screen, PCR (Inpatient) - Swab, Nares [[id number]]  (Abnormal) Collected: 09/08/22 1027    Lab Status: Final result Specimen: Swab from Nares Updated: 09/08/22 1314     MRSA PCR MRSA Detected    Narrative:      The negative  predictive value of this diagnostic test is high and should only be used to consider de-escalating anti-MRSA therapy. A positive result may indicate colonization with MRSA and must be correlated clinically.    COVID PRE-OP / PRE-PROCEDURE SCREENING ORDER (NO ISOLATION) - Swab, Nasopharynx [[id number]]  (Normal) Collected: 09/08/22 0212    Lab Status: Final result Specimen: Swab from Nasopharynx Updated: 09/08/22 0234    Narrative:      The following orders were created for panel order COVID PRE-OP / PRE-PROCEDURE SCREENING ORDER (NO ISOLATION) - Swab, Nasopharynx.  Procedure                               Abnormality         Status                     ---------                               -----------         ------                     COVID-19 and FLU A/B PCR...[[id number]]  Normal              Final result                 Please view results for these tests on the individual orders.    COVID-19 and FLU A/B PCR - Swab, Nasopharynx [[id number]]  (Normal) Collected: 09/08/22 0212    Lab Status: Final result Specimen: Swab from Nasopharynx Updated: 09/08/22 0234     COVID19 Not Detected     Influenza A PCR Not Detected     Influenza B PCR Not Detected    Narrative:      Fact sheet for providers: https://www.fda.gov/media/108637/download    Fact sheet for patients: https://www.fda.gov/media/792082/download    Test performed by PCR.    Blood Culture - Blood, Blood, Venous Line [[id number]]  (Abnormal) Collected: 09/08/22 0205    Lab Status: Final result Specimen: Blood, Venous Line Updated: 09/10/22 0619     Blood Culture Corynebacterium species     Isolated from Anaerobic Bottle     Gram Stain Anaerobic Bottle Gram positive bacilli    Narrative:      Probable contaminant requires clinical correlation, susceptibility not performed unless requested by physician.    Blood culture does not meet the specified criteria for PCR identification.  If pregnant, immunocompromised, or clinical concern for meningitis, call lab to run  BCID for Listeria monocytogenes.    Blood Culture ID, PCR - Blood, Blood, Venous Line [[id number]]  (Normal) Collected: 09/08/22 0205    Lab Status: Final result Specimen: Blood, Venous Line Updated: 09/09/22 1801     BCID, PCR Negative by BCID PCR. Culture to Follow.    Blood Culture - Blood, Blood, Central Line [[id number]]  (Normal) Collected: 09/08/22 0129    Lab Status: Preliminary result Specimen: Blood, Central Line Updated: 09/11/22 0146     Blood Culture No growth at 3 days          DVT prophylaxis: Heparin subcutaneous        Remains critically ill with hypoxic respiratory failure   Adjusted sedation and vent settings     Kyle Blackburn MD  09/11/22  13:39 EDT    Case d/w nurse and team   This service is provided via audio video tele medicine   I am in [place] and patient is in [facility]

## 2022-09-11 NOTE — PROGRESS NOTES
[facility] HOSPITALIST PROGRESS NOTE     Patient Identification:  Name:  Lillian Ayon  Age:  36 y.o.  Sex:  female  :  1985  MRN:  [id number]  Visit Number:  [id number]  ROOM: [address]     Primary Care Provider:  Jason Mathew MD    Length of stay in inpatient status:  3    Subjective     Chief Compliant:    Chief Complaint   Patient presents with   • Drug Overdose       History of Presenting Illness:    Patient still intubated and sedated today. Minimally responsive. Discussed with primary nurse. Patient's sedation increased overnight as she became agitated when she was being turned. She was also coughing with substantial secretions per report. This morning patient on 60 of propofol and 200 of fentanyl. No family bedside.     ROS:  Otherwise 10 point ROS negative other than documented above in HPI.     Objective     Current Hospital Meds:ALPRAZolam, 2 mg, Oral, Q8H  chlorhexidine, 15 mL, Mouth/Throat, Q12H  ethyl alcohol, 1 application, Nasal, BID  famotidine, 20 mg, Intravenous, Q12H  gabapentin, 800 mg, Oral, TID  heparin (porcine), 5,000 Units, Subcutaneous, Q8H  levETIRAcetam, 750 mg, Intravenous, Q12H  piperacillin-tazobactam, 3.375 g, Intravenous, Q8H  potassium chloride, 40 mEq, Oral, Q4H  sodium chloride, 10 mL, Intravenous, Q12H    fentaNYL Citrate,   LORazepam, 0.5-10 mg/hr, Last Rate: Stopped (22 1435)  Midazolam 1 mg/mL 100mL NS, 1-10 mg/hr, Last Rate: Stopped (09/10/22 1551)  norepinephrine, 0.02-0.3 mcg/kg/min, Last Rate: Stopped (22 1401)  propofol, 5-60 mcg/kg/min, Last Rate: 60 mcg/kg/min (22 0622)  sodium chloride, 100 mL/hr, Last Rate: 100 mL/hr (22 0224)        Current Antimicrobial Therapy:  Anti-Infectives (From admission, onward)    Ordered     Dose/Rate Route Frequency Start Stop    22 1216  piperacillin-tazobactam (ZOSYN) IVPB 3.375 g in 100 mL NS VTB        Note to Pharmacy: Pt previously tolerated cefepime. And Zosyn    Ordering Provider: Freddie Nava MD    3.375 g  over 4 Hours Intravenous Every 8 Hours 09/08/22 2000 09/15/22 1959    09/08/22 1216  piperacillin-tazobactam (ZOSYN) IVPB 3.375 g in 100 mL NS VTB        Ordering Provider: Freddie Nava MD    3.375 g  over 30 Minutes Intravenous Once 09/08/22 1315 09/08/22 1521    09/08/22 0824  cefepime (MAXIPIME) 2 g/100 mL 0.9% NS (mbp)        Ordering Provider: Freddie Nava MD    2 g  200 mL/hr over 30 Minutes Intravenous Once 09/08/22 0900 09/08/22 0947    09/08/22 0626  metroNIDAZOLE (FLAGYL) IVPB 500 mg        Ordering Provider: Jareth Mendez MD    500 mg  over 30 Minutes Intravenous Once 09/08/22 0628 09/08/22 0710    09/07/22 2233  cefepime (MAXIPIME) 2 g/100 mL 0.9% NS (mbp)        Ordering Provider: Damaris Escalante DO    2 g  200 mL/hr over 30 Minutes Intravenous Once 09/07/22 2235 09/08/22 0246        Current Diuretic Therapy:  Diuretics (From admission, onward)    None        ----------------------------------------------------------------------------------------------------------------------  Vital Signs:  Temp:  [98 °F (36.7 °C)-99.9 °F (37.7 °C)] 99.9 °F (37.7 °C)  Heart Rate:  [66-92] 70  Resp:  [24-25] 25  BP: (100-132)/(51-86) 124/75  FiO2 (%):  [35 %] 35 %  SpO2:  [90 %-100 %] 100 %  on   ;   Device (Oxygen Therapy): ventilator  Body mass index is 39.16 kg/m².    Wt Readings from Last 3 Encounters:   09/11/22 110 kg (242 lb 8.1 oz)   06/26/22 99.8 kg (220 lb)   04/28/22 90.7 kg (200 lb)     Intake & Output (last 3 days)       09/08 0701  09/09 0700 09/09 0701  09/10 0700 09/10 0701  09/11 0700 09/11 0701 09/12 0700    I.V. (mL/kg) 5658 (53.4) 3837.2 (35.2) 3347.3 (30.4)     Other  100 428     NG/GT  88 1153     IV Piggyback 100 600 100     Total Intake(mL/kg) 5758 (54.3) 4625.2 (42.4) 5028.3 (45.7)     Urine (mL/kg/hr) 3650 (1.4) 1025 (0.4) 4225 (1.6)     Stool 0 0 0     Total Output 3650 1025 4225     Net +2108 +3600.2 +803.3                 NPO  Diet NPO Type: Strict NPO  ----------------------------------------------------------------------------------------------------------------------  Physical exam:  GENERAL:  Patient intubated and sedated.   SKIN:  Warm, dry without rashes, purpura or petechiae. Multiple tattoos.   EYES:  Conjunctivae normal.   HEAD:  Normocephalic. Atraumatic.   EARS/NOSE/MOUTH/THROAT:  Septum midline.  No excoriations or nasal discharge. No stomatitis or ulcers.  Lips normal. Oropharynx without lesions or exudates.  NECK:  Supple with good range of motion; no thyromegaly or masses  LYMPHATICS:  No cervical, supraclavicular, axillary adenopathy.  RESP:  Lungs clear to auscultation. Good airflow. Intubated receiving mechanical ventilation.   CARDIAC:  Regular rate and rhythm without murmurs, rubs or gallops. Normal S1,S2. No edema  GI:  Soft, nontender, no hepatosplenomegaly, normal bowel sounds  MSK:  No clubbing or cyanosis, No joint swelling noted in hands  NEUROLOGICAL:  No focal deficit. Pupils equal and reactive.   ----------------------------------------------------------------------------------------------------------------------  Tele:    ----------------------------------------------------------------------------------------------------------------------  Results from last 7 days   Lab Units 09/11/22  0040 09/10/22  0111 09/09/22  0042 09/08/22  0003   LACTATE mmol/L  --   --   --  1.8   WBC 10*3/mm3 6.26 6.20 6.48 22.57*   HEMOGLOBIN g/dL 8.8* 9.0* 8.9* 11.5*   HEMATOCRIT % 26.3* 28.4* 26.7* 35.8   MCV fL 88.3 91.3 91.1 86.5   MCHC g/dL 33.5 31.7 33.3 32.1   PLATELETS 10*3/mm3 171 165 166 266   INR   --   --   --  1.02     Results from last 7 days   Lab Units 09/10/22  0503   PH, ARTERIAL pH units 7.376   PO2 ART mm Hg 101.0   PCO2, ARTERIAL mm Hg 35.1   HCO3 ART mmol/L 20.6     Results from last 7 days   Lab Units 09/11/22  0040 09/10/22  1409 09/10/22  0111 09/09/22  0042 09/08/22  1005 09/08/22  0003   SODIUM mmol/L 136   --  139 136  --  139   POTASSIUM mmol/L 3.5 4.0 3.6 3.8  --  3.3*   MAGNESIUM mg/dL  --   --   --   --  2.2 2.2   CHLORIDE mmol/L 104  --  109* 108*  --  104   CO2 mmol/L 22.5  --  18.8* 19.6*  --  22.1   BUN mg/dL 4*  --  5* 6  --  8   CREATININE mg/dL 0.38*  --  0.46* 0.59  --  0.71   CALCIUM mg/dL 8.2*  --  7.6* 7.8*  --  8.9   PHOSPHORUS mg/dL  --   --   --   --   --  2.6   GLUCOSE mg/dL 119*  --  71 84  --  173*   ALBUMIN g/dL 2.54*  --  2.61* 2.50*  --  4.31   BILIRUBIN mg/dL 0.2  --  0.2 0.2  --  0.4   ALK PHOS U/L 59  --  63 61  --  91   AST (SGOT) U/L 53*  --  81* 10  --  47*   ALT (SGPT) U/L <5  --  37* <5  --  26   Estimated Creatinine Clearance: 262.1 mL/min (A) (by C-G formula based on SCr of 0.38 mg/dL (L)).  No results found for: AMMONIA  Results from last 7 days   Lab Units 09/11/22  0040 09/10/22  0111 09/09/22  0042 09/08/22  1005 09/08/22  0003   CK TOTAL U/L 2,278* 4,694* 7,811*   < > 1,775*   TROPONIN T ng/mL  --   --   --   --  <0.010    < > = values in this interval not displayed.             Glucose   Date/Time Value Ref Range Status   09/10/2022 0802 93 70 - 130 mg/dL Final     Comment:     Meter: [id number] : [id number] RENÉE ONEILL   09/10/2022 0654 64 (L) 70 - 130 mg/dL Final     Comment:     Meter: [id number] : [id number] leti montague     Lab Results   Component Value Date    TSH 0.492 09/08/2022     Lab Results   Component Value Date    PREGTESTUR Negative 09/08/2022     Pain Management Panel     Pain Management Panel Latest Ref Rng & Units 9/8/2022 5/25/2021    AMPHETAMINES SCREEN, URINE Negative Positive(A) Positive(A)    BARBITURATES SCREEN Negative Negative Negative    BENZODIAZEPINE SCREEN, URINE Negative Positive(A) Negative    BUPRENORPHINEUR Negative Negative Negative    COCAINE SCREEN, URINE Negative Negative Negative    METHADONE SCREEN, URINE Negative Positive(A) Negative    METHAMPHETAMINEUR Negative Positive(A) -        Brief Urine Lab Results  (Last result in  the past 365 days)      Color   Clarity   Blood   Leuk Est   Nitrite   Protein   CREAT   Urine HCG        09/08/22 0112 Yellow   Cloudy   Large (3+)   Negative   Negative   Trace           09/08/22 0112               Negative           Blood Culture   Date Value Ref Range Status   09/08/2022 Corynebacterium species (C)  Final   09/08/2022 No growth at 3 days  Preliminary     No results found for: URINECX  No results found for: WOUNDCX  No results found for: STOOLCX  No results found for: RESPCX  No results found for: AFBCX  Results from last 7 days   Lab Units 09/08/22  0003   PROCALCITONIN ng/mL 0.06   LACTATE mmol/L 1.8       I have personally looked at the labs and they are summarized above.  ----------------------------------------------------------------------------------------------------------------------  Detailed radiology reports for the last 24 hours:    Imaging Results (Last 24 Hours)     Procedure Component Value Units Date/Time    XR Chest 1 View [[id number]] Resulted: 09/11/22 0815     Updated: 09/11/22 0815        Assessment & Plan    #Acute respiratory failure 2/2 drug overdose   #Aspiration pneumonia  - Intubated in emergency room for airway protection   - Highest on differential of AMS is drug overdose (gabapentin most likely) vs. Benzo withdrawal.   - CT chest/abdomen/pelvis/head unrevealing other than bibasilar infiltrates likely from aspiration   - Transitioned to zosyn as cefepime can lower seizure threshold. Mild allergy to cephalosporins listed but tolerated cefepime in ED. Day 4/5.   - Will follow respiratory culture and blood cultures   - Patient with significant benzo/opiate tolerance required higher than usual doses of sedation.   - Currently weaning sedation. Patient off ativan and versed. Will wean propofol/fentanyl today. On low vent settings. If patient wakes up and is not combative off, will hopefully be able to extubate soon.   - Would consider seroquel but QTC prolonged   -  Continue scheduled PO ativan to help prevent benzo withdrawal as we wean sedation. Will plan to wean.    - Pulmonology following. Appreciate recs.      #Precedex induced bradycardia   - EKG NSR, nonspecific T-wave abnormality   - Echo revealed normal LVEF and no significant valvular abnromalities.   - Will avoid precedex and monitor on tele      #Polysubstance drug use   - UDS positive for benzodiazepine, methamphetamine, methadone. There is also concern for gabapentin misuse and overdose.      #Elevated CK  - Preserved renal function  - CK: 1775=>8122=>7811=>2278  - Now that CK is under 5,000, I think development of pigment induced renal injury unlikely.   - Decrease fluids to 75 cc/hr       #Reported seizure and active jerking/monoclonus   - Seen by EMS and initially in ED   - Suspect from gabapentin overdose or possibly benzo withdrawal   - Loaded on keppra in ED. Will continue 750 IV BID keppra for now.   - STAT EEG showed nonspecific moderate degree slowing but no evidence of epileptic activity   - Tele neuro following. Appreciate recs.      #Hypoglycemia  - Episode on 9/10. Has resolved after initiation of tube feeds.      #Positive blood culture   - 1/2 cultures on admission growing gram positive bacilli that BCID identified as corynebacterium and present on culture also.   - Suspect contaminant. Repeat blood cultures from 9/10 NGTD. Will continue Zosyn as above.      #Hx of hepatitis C  #Mild hepatocellular transaminitis   - Ammonia level nml. No history of cirrhosis. Do not suspect hepatic encephalopathy at this point   - Family reported they believe she is s/p treatment for the hepatitis C.   - Repeat hepatitis panel still positive for hepatitis C Ab. Outpatient viral load may be warranted depending on prior treatments.   - HIV screen negative      #Acute vs. Chronic normocytic anemia  - Baseline hgb appears to be 11-13  - Hgb at presentaiton 11.5=>9.0  - No active signs of bleeidng   - Will get iron  profile, vitamin B12 and folate levels.      #Tobacco use  - Recommend cessation   - Will offer NRT      F: NPO, Tfs ordered  A: Fentanyl   S: Propofol, versed  T: Heparin SubQ  H: HOB elevated  U: Famotadine   G: PRN  S: Daily  B: PRN  I: ETT 9/8. PICC line 9/8, vlad MIRZA   D: Zosyn      Code status: Full. Patient's NOK is her mother, Yessy, [phone].     Dispo: Continue CCU level care      40 minutes of critical care spent on the care of patient. Patient critically ill requiring mechanical ventilation. 50% of time bedside or discussing care with family.     VTE Prophylaxis:   Mechanical Order History:     None      Pharmalogical Order History:      Ordered     Dose Route Frequency Stop    09/08/22 0906  heparin (porcine) 5000 UNIT/ML injection 5,000 Units         5,000 Units SC Every 8 Hours Scheduled --                Freddie Nava MD  [facility]  09/11/22  08:34 EDT

## 2022-09-11 NOTE — PLAN OF CARE
Goal Outcome Evaluation:  Plan of Care Reviewed With: patient, family        Progress: improving    Pt remains intubated and sedated on vent with fi02@35% and thick secretions,  vitals are wnl, pt becomes agitated with decreased sedation.  Will continue to monitor.

## 2022-09-12 ENCOUNTER — APPOINTMENT (OUTPATIENT)
Dept: GENERAL RADIOLOGY | Facility: HOSPITAL | Age: 37
End: 2022-09-12

## 2022-09-12 LAB
ALBUMIN SERPL-MCNC: 2.53 G/DL (ref 3.5–5.2)
ALBUMIN/GLOB SERPL: 0.8 G/DL
ALP SERPL-CCNC: 60 U/L (ref 39–117)
ALT SERPL W P-5'-P-CCNC: <5 U/L (ref 1–33)
ANION GAP SERPL CALCULATED.3IONS-SCNC: 9.6 MMOL/L (ref 5–15)
AST SERPL-CCNC: <5 U/L (ref 1–32)
BASOPHILS # BLD AUTO: 0.02 10*3/MM3 (ref 0–0.2)
BASOPHILS NFR BLD AUTO: 0.3 % (ref 0–1.5)
BILIRUB SERPL-MCNC: 0.2 MG/DL (ref 0–1.2)
BUN SERPL-MCNC: 5 MG/DL (ref 6–20)
BUN/CREAT SERPL: 13.2 (ref 7–25)
CALCIUM SPEC-SCNC: 8.4 MG/DL (ref 8.6–10.5)
CHLORIDE SERPL-SCNC: 103 MMOL/L (ref 98–107)
CO2 SERPL-SCNC: 24.4 MMOL/L (ref 22–29)
CREAT SERPL-MCNC: 0.38 MG/DL (ref 0.57–1)
DEPRECATED RDW RBC AUTO: 44.2 FL (ref 37–54)
EGFRCR SERPLBLD CKD-EPI 2021: 135 ML/MIN/1.73
EOSINOPHIL # BLD AUTO: 0.28 10*3/MM3 (ref 0–0.4)
EOSINOPHIL NFR BLD AUTO: 4 % (ref 0.3–6.2)
ERYTHROCYTE [DISTWIDTH] IN BLOOD BY AUTOMATED COUNT: 13.9 % (ref 12.3–15.4)
GABAPENTIN SERPLBLD-MCNC: 10.8 UG/ML (ref 4–16)
GLOBULIN UR ELPH-MCNC: 3.2 GM/DL
GLUCOSE SERPL-MCNC: 100 MG/DL (ref 65–99)
HCT VFR BLD AUTO: 26.6 % (ref 34–46.6)
HGB BLD-MCNC: 9 G/DL (ref 12–15.9)
IMM GRANULOCYTES # BLD AUTO: 0.02 10*3/MM3 (ref 0–0.05)
IMM GRANULOCYTES NFR BLD AUTO: 0.3 % (ref 0–0.5)
LYMPHOCYTES # BLD AUTO: 1.96 10*3/MM3 (ref 0.7–3.1)
LYMPHOCYTES NFR BLD AUTO: 28.3 % (ref 19.6–45.3)
MCH RBC QN AUTO: 29.6 PG (ref 26.6–33)
MCHC RBC AUTO-ENTMCNC: 33.8 G/DL (ref 31.5–35.7)
MCV RBC AUTO: 87.5 FL (ref 79–97)
MONOCYTES # BLD AUTO: 0.53 10*3/MM3 (ref 0.1–0.9)
MONOCYTES NFR BLD AUTO: 7.7 % (ref 5–12)
NEUTROPHILS NFR BLD AUTO: 4.11 10*3/MM3 (ref 1.7–7)
NEUTROPHILS NFR BLD AUTO: 59.4 % (ref 42.7–76)
NRBC BLD AUTO-RTO: 0 /100 WBC (ref 0–0.2)
PLATELET # BLD AUTO: 196 10*3/MM3 (ref 140–450)
PMV BLD AUTO: 8.7 FL (ref 6–12)
POTASSIUM SERPL-SCNC: 3.4 MMOL/L (ref 3.5–5.2)
PROT SERPL-MCNC: 5.7 G/DL (ref 6–8.5)
QT INTERVAL: 400 MS
QTC INTERVAL: 461 MS
RBC # BLD AUTO: 3.04 10*6/MM3 (ref 3.77–5.28)
SODIUM SERPL-SCNC: 137 MMOL/L (ref 136–145)
WBC NRBC COR # BLD: 6.92 10*3/MM3 (ref 3.4–10.8)

## 2022-09-12 PROCEDURE — 94761 N-INVAS EAR/PLS OXIMETRY MLT: CPT

## 2022-09-12 PROCEDURE — 25010000002 LEVETRIRACETAM PER 10 MG: Performed by: INTERNAL MEDICINE

## 2022-09-12 PROCEDURE — 25010000002 HEPARIN (PORCINE) PER 1000 UNITS: Performed by: INTERNAL MEDICINE

## 2022-09-12 PROCEDURE — 71045 X-RAY EXAM CHEST 1 VIEW: CPT

## 2022-09-12 PROCEDURE — 85025 COMPLETE CBC W/AUTO DIFF WBC: CPT | Performed by: INTERNAL MEDICINE

## 2022-09-12 PROCEDURE — 94799 UNLISTED PULMONARY SVC/PX: CPT

## 2022-09-12 PROCEDURE — 87147 CULTURE TYPE IMMUNOLOGIC: CPT | Performed by: STUDENT IN AN ORGANIZED HEALTH CARE EDUCATION/TRAINING PROGRAM

## 2022-09-12 PROCEDURE — 94003 VENT MGMT INPAT SUBQ DAY: CPT

## 2022-09-12 PROCEDURE — 25010000002 PROPOFOL 10 MG/ML EMULSION: Performed by: INTERNAL MEDICINE

## 2022-09-12 PROCEDURE — 87070 CULTURE OTHR SPECIMN AEROBIC: CPT | Performed by: STUDENT IN AN ORGANIZED HEALTH CARE EDUCATION/TRAINING PROGRAM

## 2022-09-12 PROCEDURE — 87186 SC STD MICRODIL/AGAR DIL: CPT | Performed by: STUDENT IN AN ORGANIZED HEALTH CARE EDUCATION/TRAINING PROGRAM

## 2022-09-12 PROCEDURE — 80053 COMPREHEN METABOLIC PANEL: CPT | Performed by: INTERNAL MEDICINE

## 2022-09-12 PROCEDURE — 99233 SBSQ HOSP IP/OBS HIGH 50: CPT | Performed by: STUDENT IN AN ORGANIZED HEALTH CARE EDUCATION/TRAINING PROGRAM

## 2022-09-12 PROCEDURE — 87205 SMEAR GRAM STAIN: CPT | Performed by: STUDENT IN AN ORGANIZED HEALTH CARE EDUCATION/TRAINING PROGRAM

## 2022-09-12 PROCEDURE — 25010000002 LORAZEPAM PER 2 MG: Performed by: INTERNAL MEDICINE

## 2022-09-12 PROCEDURE — 25010000002 PIPERACILLIN SOD-TAZOBACTAM PER 1 G: Performed by: INTERNAL MEDICINE

## 2022-09-12 RX ORDER — FERROUS SULFATE 325(65) MG
325 TABLET ORAL
Status: DISCONTINUED | OUTPATIENT
Start: 2022-09-13 | End: 2022-09-14 | Stop reason: HOSPADM

## 2022-09-12 RX ORDER — SODIUM CHLORIDE 0.9 % (FLUSH) 0.9 %
10 SYRINGE (ML) INJECTION AS NEEDED
Status: DISCONTINUED | OUTPATIENT
Start: 2022-09-12 | End: 2022-09-14 | Stop reason: HOSPADM

## 2022-09-12 RX ORDER — SODIUM CHLORIDE 0.9 % (FLUSH) 0.9 %
10 SYRINGE (ML) INJECTION EVERY 12 HOURS SCHEDULED
Status: DISCONTINUED | OUTPATIENT
Start: 2022-09-12 | End: 2022-09-14 | Stop reason: HOSPADM

## 2022-09-12 RX ORDER — CHLORDIAZEPOXIDE HYDROCHLORIDE 25 MG/1
25 CAPSULE, GELATIN COATED ORAL EVERY 6 HOURS SCHEDULED
Status: DISCONTINUED | OUTPATIENT
Start: 2022-09-12 | End: 2022-09-14

## 2022-09-12 RX ORDER — SODIUM CHLORIDE 0.9 % (FLUSH) 0.9 %
20 SYRINGE (ML) INJECTION AS NEEDED
Status: DISCONTINUED | OUTPATIENT
Start: 2022-09-12 | End: 2022-09-14 | Stop reason: HOSPADM

## 2022-09-12 RX ORDER — AMOXICILLIN AND CLAVULANATE POTASSIUM 200; 28.5 MG/5ML; MG/5ML
500 POWDER, FOR SUSPENSION ORAL EVERY 8 HOURS
Status: DISCONTINUED | OUTPATIENT
Start: 2022-09-12 | End: 2022-09-13

## 2022-09-12 RX ORDER — POTASSIUM CHLORIDE 1.5 G/1.77G
40 POWDER, FOR SOLUTION ORAL EVERY 4 HOURS
Status: COMPLETED | OUTPATIENT
Start: 2022-09-12 | End: 2022-09-12

## 2022-09-12 RX ADMIN — Medication 10 ML: at 21:12

## 2022-09-12 RX ADMIN — SODIUM CHLORIDE 75 ML/HR: 9 INJECTION, SOLUTION INTRAVENOUS at 22:41

## 2022-09-12 RX ADMIN — FAMOTIDINE 20 MG: 10 INJECTION, SOLUTION INTRAVENOUS at 21:11

## 2022-09-12 RX ADMIN — GABAPENTIN 800 MG: 400 CAPSULE ORAL at 21:12

## 2022-09-12 RX ADMIN — Medication 10 ML: at 08:32

## 2022-09-12 RX ADMIN — PROPOFOL 45 MCG/KG/MIN: 10 INJECTION, EMULSION INTRAVENOUS at 13:16

## 2022-09-12 RX ADMIN — PROPOFOL 50 MCG/KG/MIN: 10 INJECTION, EMULSION INTRAVENOUS at 01:27

## 2022-09-12 RX ADMIN — HEPARIN SODIUM 5000 UNITS: 5000 INJECTION INTRAVENOUS; SUBCUTANEOUS at 21:12

## 2022-09-12 RX ADMIN — ETHYL ALCOHOL 1 EACH: 62 SWAB TOPICAL at 08:32

## 2022-09-12 RX ADMIN — PROPOFOL 50 MCG/KG/MIN: 10 INJECTION, EMULSION INTRAVENOUS at 08:00

## 2022-09-12 RX ADMIN — GABAPENTIN 800 MG: 400 CAPSULE ORAL at 08:31

## 2022-09-12 RX ADMIN — Medication: at 11:44

## 2022-09-12 RX ADMIN — PROPOFOL 50 MCG/KG/MIN: 10 INJECTION, EMULSION INTRAVENOUS at 04:59

## 2022-09-12 RX ADMIN — ETHYL ALCOHOL 1 EACH: 62 SWAB TOPICAL at 21:12

## 2022-09-12 RX ADMIN — CHLORHEXIDINE GLUCONATE 15 ML: 1.2 RINSE ORAL at 08:03

## 2022-09-12 RX ADMIN — CHLORDIAZEPOXIDE HYDROCHLORIDE 25 MG: 25 CAPSULE ORAL at 18:17

## 2022-09-12 RX ADMIN — CHLORDIAZEPOXIDE HYDROCHLORIDE 25 MG: 25 CAPSULE ORAL at 11:09

## 2022-09-12 RX ADMIN — POTASSIUM CHLORIDE 40 MEQ: 1.5 POWDER, FOR SOLUTION ORAL at 08:31

## 2022-09-12 RX ADMIN — PROPOFOL 60 MCG/KG/MIN: 10 INJECTION, EMULSION INTRAVENOUS at 16:49

## 2022-09-12 RX ADMIN — CHLORHEXIDINE GLUCONATE 15 ML: 1.2 RINSE ORAL at 21:12

## 2022-09-12 RX ADMIN — ALPRAZOLAM 2 MG: 1 TABLET ORAL at 08:32

## 2022-09-12 RX ADMIN — Medication 10 ML: at 21:13

## 2022-09-12 RX ADMIN — FAMOTIDINE 20 MG: 10 INJECTION, SOLUTION INTRAVENOUS at 08:31

## 2022-09-12 RX ADMIN — SODIUM CHLORIDE 75 ML/HR: 9 INJECTION, SOLUTION INTRAVENOUS at 10:09

## 2022-09-12 RX ADMIN — LORAZEPAM 2 MG: 2 INJECTION INTRAMUSCULAR; INTRAVENOUS at 18:41

## 2022-09-12 RX ADMIN — PROPOFOL 60 MCG/KG/MIN: 10 INJECTION, EMULSION INTRAVENOUS at 22:34

## 2022-09-12 RX ADMIN — LORAZEPAM 2 MG: 2 INJECTION INTRAMUSCULAR; INTRAVENOUS at 21:06

## 2022-09-12 RX ADMIN — GABAPENTIN 800 MG: 400 CAPSULE ORAL at 16:28

## 2022-09-12 RX ADMIN — LORAZEPAM 2 MG: 2 INJECTION INTRAMUSCULAR; INTRAVENOUS at 08:00

## 2022-09-12 RX ADMIN — LORAZEPAM 2 MG: 2 INJECTION INTRAMUSCULAR; INTRAVENOUS at 23:41

## 2022-09-12 RX ADMIN — LORAZEPAM 2 MG: 2 INJECTION INTRAMUSCULAR; INTRAVENOUS at 03:26

## 2022-09-12 RX ADMIN — POTASSIUM CHLORIDE 40 MEQ: 1.5 POWDER, FOR SOLUTION ORAL at 05:31

## 2022-09-12 RX ADMIN — HEPARIN SODIUM 5000 UNITS: 5000 INJECTION INTRAVENOUS; SUBCUTANEOUS at 13:54

## 2022-09-12 RX ADMIN — Medication: at 04:26

## 2022-09-12 RX ADMIN — PROPOFOL 60 MCG/KG/MIN: 10 INJECTION, EMULSION INTRAVENOUS at 19:51

## 2022-09-12 RX ADMIN — PIPERACILLIN SODIUM AND TAZOBACTAM SODIUM 3.38 G: 3; .375 INJECTION, POWDER, LYOPHILIZED, FOR SOLUTION INTRAVENOUS at 03:25

## 2022-09-12 RX ADMIN — AMOXICILLIN AND CLAVULANATE POTASSIUM 500 MG: 200; 28.5 POWDER, FOR SUSPENSION ORAL at 18:17

## 2022-09-12 RX ADMIN — Medication 10 ML: at 08:33

## 2022-09-12 RX ADMIN — ALPRAZOLAM 2 MG: 1 TABLET ORAL at 03:25

## 2022-09-12 RX ADMIN — AMOXICILLIN AND CLAVULANATE POTASSIUM 500 MG: 200; 28.5 POWDER, FOR SUSPENSION ORAL at 11:09

## 2022-09-12 RX ADMIN — LORAZEPAM 2 MG: 2 INJECTION INTRAMUSCULAR; INTRAVENOUS at 06:38

## 2022-09-12 RX ADMIN — Medication: at 18:41

## 2022-09-12 RX ADMIN — LORAZEPAM 2 MG: 2 INJECTION INTRAMUSCULAR; INTRAVENOUS at 15:12

## 2022-09-12 RX ADMIN — HEPARIN SODIUM 5000 UNITS: 5000 INJECTION INTRAVENOUS; SUBCUTANEOUS at 05:31

## 2022-09-12 NOTE — PAYOR COMM NOTE
"[facility]  NPI: [id number]    Utilization Review   Contact:lFori Burr MSN, APRN, NP-C  Phone: [phone]  Fax: [phone]      Continue Stay Update  REF# [id number]      Ravi Seo (36 y.o. Female)             Date of Birth   1985    Social Security Number       Address   [address] [place] [address]    Home Phone   [phone]    MRN   [id number]       Anab[employer]ism   L[employer] day    Marital Status   Single                            Admission Date   9/7/22    Admission Type   Emergency    Admitting Provider   Freddie Nava MD    Attending Provider   Dez Mccord MD    Department, Room/Bed   [facility] CRITICAL CARE, 03/       Discharge Date       Discharge Disposition       Discharge Destination                               Attending Provider: Dez Mccord MD    Allergies: Adhesive Tape, Bactrim [Sulfamethoxazole-trimethoprim], Codeine, Keflex [Cephalexin]    Isolation: None   Infection: MRSA No Isolation this Admit (09/10/22)   Code Status: CPR   Advance Care Planning Activity    Ht: 167.6 cm (65.98\")   Wt: 110 kg (241 lb 9.6 oz)    Admission Cmt: None   Principal Problem: None                Active Insurance as of 9/7/2022     Primary Coverage     Payor Plan Insurance Group Employer/Plan Group    [facility] BY YODER [facility] BY BEBA [id number]     Payor Plan Address Payor Plan Phone Number Payor Plan Fax Number Effective Dates    [address] [address]   1/1/2021 - None Entered    [facility] [phone]       Subscriber Name Subscriber Birth Date Member ID       RAVI SEO 1985 [id number]                 Emergency Contacts      (Rel.) Home Phone Work Phone Mobile Phone    Mayte Seo (Sister) [phone] -- --    Yessy Seo (Mother) [phone] -- --            Ventilator/Non-Invasive Ventilation Settings (From admission, onward)             Start     Ordered    09/08/22 1001  Ventilator - AC/VC; Other; 25; 40%; " SpO2 >/= 90%; 5; mL; 400  Continuous        Question Answer Comment   Vent Mode AC/VC    Rate Other    Rate 25    FiO2 40%    Titrate FiO2 to Keep SpO2 >/= 90%    PEEP 5    Tidal Volume mL            22 1001    22 2333  Ventilator - AC/VC; Other; 25; 100%; SpO2 >/= 90%; 5; mL; 400  Continuous,   Status:  Canceled        Question Answer Comment   Vent Mode AC/VC    Rate Other    Rate 25    FiO2 100%    Titrate FiO2 to Keep SpO2 >/= 90%    PEEP 5    Tidal Volume mL            22 2333                   Physician Progress Notes (last 24 hours)      Bolivar Cervantes MD at 22 0856            PROGRESS NOTE  Patient Name: Lillian Ayon  Age/Sex: 36 y.o. female  : 1985  MRN: [id number]    Date of Admission: 2022  Date of Encounter Visit: 22   LOS: 4 days   Patient Care Team:  Jason Mathew MD as PCP - General (Family Medicine)    Chief Complaint: Patient was admitted with respiratory failure due to drug overdose and was intubated for airway protection    Hospital course: Patient admitted with drug overdose, currently sedated, orally intubated, on the ventilator.  He failed his weaning trial yesterday because of the agitation and had to be back on sedation while on the ventilator.  Was ruled out for seizure currently on Xanax scheduled and as needed IV Ativan for benzo withdrawal.  She had no witnessed activity since presentation, there was a questionable seizure-like activity reported at the care home facility before she was brought and.  She has been on Keppra since  She is getting some Ativan scheduled and Xanax scheduled, her drug screen came back positive for several substances  Patient is still on 50 of propofol 200 and fentanyl and she still wakes up to stimulation and was able to follow commands.  She has been on antibiotic with Zosyn day #4 her white count was elevated at 22 on presentation but this down to normal for more than 2 days now.  Her blood gas and  "her labs were reviewed and they were all within okay range.  Her chest x-ray showed some underinflated lungs, patient would benefit from slightly higher PEEP.  Patient has IV drug abuse, the echocardiogram did not show any abnormal vegetation and her blood cultures were negative except for 1 culture that is likely a contaminant.    REVIEW OF SYSTEMS:   CONSTITUTIONAL: no fever or chills  Limited system review otherwise while sedated and orally intubated and on the ventilator  Ventilator/Non-Invasive Ventilation Settings (From admission, onward)             Start     Ordered    09/08/22 1001  Ventilator - AC/VC; Other; 25; 40%; SpO2 >/= 90%; 5; mL; 400  Continuous        Question Answer Comment   Vent Mode AC/VC    Rate Other    Rate 25    FiO2 40%    Titrate FiO2 to Keep SpO2 >/= 90%    PEEP 5    Tidal Volume mL            09/08/22 1001    09/07/22 2333  Ventilator - AC/VC; Other; 25; 100%; SpO2 >/= 90%; 5; mL; 400  Continuous,   Status:  Canceled        Question Answer Comment   Vent Mode AC/VC    Rate Other    Rate 25    FiO2 100%    Titrate FiO2 to Keep SpO2 >/= 90%    PEEP 5    Tidal Volume mL            09/07/22 2333                  Vital Signs  Temp:  [98.5 °F (36.9 °C)-99.5 °F (37.5 °C)] 99.5 °F (37.5 °C)  Heart Rate:  [] 82  Resp:  [24-26] 25  BP: (102-131)/(42-95) 131/93  FiO2 (%):  [35 %] 35 %  SpO2:  [93 %-100 %] 96 %  on    Device (Oxygen Therapy): ventilator    Intake/Output Summary (Last 24 hours) at 9/12/2022 0856  Last data filed at 9/12/2022 0600  Gross per 24 hour   Intake 5319.36 ml   Output 5730 ml   Net -410.64 ml     Flowsheet Rows    Flowsheet Row First Filed Value   Admission Height 167.6 cm (66\") Documented at 09/07/2022 2116   Admission Weight 99.8 kg (220 lb) Documented at 09/07/2022 2116        Body mass index is 39.01 kg/m².      09/10/22  0441 09/11/22  0500 09/12/22  0500   Weight: 109 kg (240 lb 8.4 oz) 110 kg (242 lb 8.1 oz) 110 kg (241 lb 9.6 oz)       Physical " Exam:  GEN: Sedated but arousable, orally intubated, on the ventilator  EYES:   Sclerae clear. No icterus. PERRL.  Tracks with eyes properly  ENT:   External ears/nose normal, no oral lesions, no thrush, mucous membranes moist, oral ET tube  NECK:  Supple, midline trachea, no JVD  LUNGS: Normal chest on inspection, CTAB, no wheezes. No rhonchi. No crackles. Respirations regular, even and unlabored.   CV:  Regular rhythm and rate. Normal S1/S2. No murmurs, gallops, or rubs noted.  ABD:  Soft, nontender and nondistended. Normal bowel sounds. No guarding  EXT:  Moves all extremities well. No cyanosis. No redness.  Trace edema.  She has evidence of Doubtre needle tracks  Skin: Dry, intact, no bleeding    Results Review:    Results From Last 14 Days   Lab Units 09/08/22  0003   LACTATE mmol/L 1.8     Results from last 7 days   Lab Units 09/12/22  0103 09/11/22  0040 09/10/22  1409 09/10/22  0111 09/09/22  0042 09/08/22  0003   SODIUM mmol/L 137 136  --  139 136 139   POTASSIUM mmol/L 3.4* 3.5 4.0 3.6 3.8 3.3*   CHLORIDE mmol/L 103 104  --  109* 108* 104   CO2 mmol/L 24.4 22.5  --  18.8* 19.6* 22.1   BUN mg/dL 5* 4*  --  5* 6 8   CREATININE mg/dL 0.38* 0.38*  --  0.46* 0.59 0.71   CALCIUM mg/dL 8.4* 8.2*  --  7.6* 7.8* 8.9   AST (SGOT) U/L <5 53*  --  81* 10 47*   ALT (SGPT) U/L <5 <5  --  37* <5 26   ANION GAP mmol/L 9.6 9.5  --  11.2 8.4 12.9   ALBUMIN g/dL 2.53* 2.54*  --  2.61* 2.50* 4.31     Results from last 7 days   Lab Units 09/11/22  0040 09/10/22  0111 09/09/22  0042 09/08/22  1005 09/08/22  0003   CK TOTAL U/L 2,278* 4,694* 7,811*   < > 1,775*   TROPONIN T ng/mL  --   --   --   --  <0.010    < > = values in this interval not displayed.     Results from last 7 days   Lab Units 09/08/22  0003   TSH uIU/mL 0.492         Results from last 7 days   Lab Units 09/12/22  0103 09/11/22  0040 09/10/22  0111 09/09/22  0042 09/08/22  0003   WBC 10*3/mm3 6.92 6.26 6.20 6.48 22.57*   HEMOGLOBIN g/dL 9.0* 8.8* 9.0* 8.9* 11.5*    HEMATOCRIT % 26.6* 26.3* 28.4* 26.7* 35.8   PLATELETS 10*3/mm3 196 171 165 166 266   MCV fL 87.5 88.3 91.3 91.1 86.5   NEUTROPHIL % % 59.4 61.2 62.9 74.3 92.7*   LYMPHOCYTE % % 28.3 27.6 26.0 18.8* 2.4*   MONOCYTES % % 7.7 6.9 7.7 6.2 4.3*   EOSINOPHIL % % 4.0 3.7 2.6 0.2* 0.0*   BASOPHIL % % 0.3 0.3 0.5 0.2 0.2   IMM GRAN % % 0.3 0.3 0.3 0.3 0.4     Results from last 7 days   Lab Units 09/08/22  0003   INR  1.02   APTT seconds 25.1*     Results from last 7 days   Lab Units 09/08/22  1005 09/08/22  0003   MAGNESIUM mg/dL 2.2 2.2           Invalid input(s): LDLCALC  Results from last 7 days   Lab Units 09/10/22  0503 09/09/22  0923 09/08/22  0918 09/08/22  0204 09/08/22  0202 09/07/22  2327   PH, ARTERIAL pH units 7.376 7.392 7.414 7.329* 7.301* 7.345*   PCO2, ARTERIAL mm Hg 35.1 35.2 35.2 42.8 47.1* 38.2   PO2 ART mm Hg 101.0 94.7 84.3 172.0* 66.1* 99.0   HCO3 ART mmol/L 20.6 21.3 22.5 22.5 23.2 20.9         Glucose   Date/Time Value Ref Range Status   09/10/2022 0802 93 70 - 130 mg/dL Final     Comment:     Meter: [id number] : [id number] RENÉE ONEILL   09/10/2022 0654 64 (L) 70 - 130 mg/dL Final     Comment:     Meter: [id number] : [id number] leti butjulien     Results from last 7 days   Lab Units 09/08/22  0003   PROCALCITONIN ng/mL 0.06   LACTATE mmol/L 1.8     Results from last 7 days   Lab Units 09/10/22  1409 09/08/22  0205 09/08/22  0129   BLOODCX  No growth at 24 hours  No growth at 24 hours Corynebacterium species* No growth at 4 days   BCIDPCR   --  Negative by BCID PCR. Culture to Follow.  --      Results from last 7 days   Lab Units 09/08/22  0112   NITRITE UA  Negative   WBC UA /HPF 0-2   BACTERIA UA /HPF None Seen   SQUAM EPITHEL UA /HPF None Seen     Results from last 7 days   Lab Units 09/08/22  0212   COVID19  Not Detected   INFLUENZA B PCR  Not Detected               Imaging:   Imaging Results (All)           I reviewed the patient's new clinical results.  I personally viewed and  interpreted the patient's imaging results: Chest x-ray probably was an expiratory film.  The reduced lung volume despite the adequate ABG results.  Patient had some bibasilar infiltrate on the CAT scan more so on the left side consistent with pneumonia/atelectasis        Medication Review:   ALPRAZolam, 2 mg, Oral, Q6H  chlorhexidine, 15 mL, Mouth/Throat, Q12H  ethyl alcohol, 1 application, Nasal, BID  famotidine, 20 mg, Intravenous, Q12H  gabapentin, 800 mg, Oral, TID  heparin (porcine), 5,000 Units, Subcutaneous, Q8H  levETIRAcetam, 750 mg, Intravenous, Q12H  piperacillin-tazobactam, 3.375 g, Intravenous, Q8H  sodium chloride, 10 mL, Intravenous, Q12H  sodium chloride, 10 mL, Intravenous, Q12H  sodium chloride, 10 mL, Intravenous, Q12H  sodium chloride, 10 mL, Intravenous, Q12H        fentaNYL Citrate,   LORazepam, 0.5-10 mg/hr, Last Rate: Stopped (09/09/22 1435)  Midazolam 1 mg/mL 100mL NS, 1-10 mg/hr, Last Rate: Stopped (09/10/22 1551)  norepinephrine, 0.02-0.3 mcg/kg/min, Last Rate: Stopped (09/08/22 1401)  propofol, 5-60 mcg/kg/min, Last Rate: 50 mcg/kg/min (09/12/22 0800)  sodium chloride, 75 mL/hr, Last Rate: 75 mL/hr (09/11/22 1705)        ASSESSMENT:   1. Acute respiratory failure, inability to protect airway related to overdose  2. Overdose of unknown substance, notable for possession of gabapentin tablets on ED admission  3. Urine toxicology positive for multiple substances: Methamphetamines, amphetamines, benzodiazepines, methadone  4. Bilateral lower lobe pneumonia, likely aspiration  5. Hepatitis C  6. Mild normocytic anemia  7. History of anxiety, fibromyalgia  8. History of drug abuse  9. Mild rhabdomyolysis on presentation, CPK trending down    PLAN:  Summary of recommendations:  · Start patient on scheduled Librium instead of the oral Xanax  · No need for the Keppra, patient needs to be on benzodiazepine to reduce risk of withdrawal seizure  · Patient will be transition to oral antibiotic which  "can be discontinued tomorrow  · Hopefully on the scheduled Librium we might have a better chance of weaning sedation and liberating from the ventilator, will reassess on a daily basis and proceed once we have a good window  · Continue to track the CK level, she is on adequate IV hydration which will be continued    Discussed with ICU rounding team, with the nursing staff at the bedside, there is no family    Disposition:     Bolivar Cervantes MD  09/12/22  08:56 EDT      Time: Critical care 38 min      Dictated utilizing Dragon dictation    Electronically signed by Bolivar Cervantes MD at 09/12/22 1031     Freddie Nava MD at 09/11/22 1525        Patient had large emesis and significant secretions. Sedation almost weaned off but patient also became agitated. Not following commands. Will sedate again with propofol and fentanyl with plan to try SBT again tomorrow. I am starting to suspect event that led to seizure activity was less likely overdose but benzo withdrawal. Nursing staff noting patient is much more comfortable after getting xanex but not lasting until next dose and has got a total of 8 mg IV ativan today. Will increase xanax from Q8hrs to Q6hrs and decrease PRN ativan to 2 mg from 4 mg.     Electronically signed by Freddie Nava MD at 09/11/22 1529     Kyle Blackburn MD at 09/11/22 1339           LOS: 3 days     Chief Complaint:  Pulmonology is following for ventilator management    Subjective     Interval History:   Remains sedated and remains intubated   Titrated sedation down   Vent settings adjusted   Review of Systems:     Unable to obtain review of systems due to intubation and sedation.       Objective     Vital Signs  /81   Pulse 78   Temp 98.8 °F (37.1 °C) (Oral)   Resp 25   Ht 167.6 cm (65.98\")   Wt 110 kg (242 lb 8.1 oz)   LMP  (LMP Unknown)   SpO2 94%   BMI 39.16 kg/m²      Physical Exam:  No changes   Physical exam limited due to telemedicine  General:   intubated. Sedated. "   HENT: normocephalic. Atraumatic.   Cardiac: normal rate and rhythm noted on telemetry  Lungs:  on ventilator support. compliant with current settings.   Musculoskeletal: no significant deformity, joint abnormality  Neurologic: sedated                  Results Review:   I reviewed the patient's new clinical results.  I reviewed the patient's new imaging results and agree with the interpretation.        CBC      CBC 9/9/22 9/10/22 9/11/22   WBC 6.48 6.20 6.26   RBC 2.93 (A) 3.11 (A) 2.98 (A)   Hemoglobin 8.9 (A) 9.0 (A) 8.8 (A)   Hematocrit 26.7 (A) 28.4 (A) 26.3 (A)   MCV 91.1 91.3 88.3   MCH 30.4 28.9 29.5   MCHC 33.3 31.7 33.5   RDW 14.0 14.5 14.0   Platelets 166 165 171   (A) Abnormal value                  CMP      CMP 9/9/22 9/10/22 9/10/22 9/11/22     0111 1409    Glucose 84 71  119 (A)   BUN 6 5 (A)  4 (A)   Creatinine 0.59 0.46 (A)  0.38 (A)   Sodium 136 139  136   Potassium 3.8 3.6 4.0 3.5   Chloride 108 (A) 109 (A)  104   Calcium 7.8 (A) 7.6 (A)  8.2 (A)   Albumin 2.50 (A) 2.61 (A)  2.54 (A)   Total Bilirubin 0.2 0.2  0.2   Alkaline Phosphatase 61 63  59   AST (SGOT) 10 81 (A)  53 (A)   ALT (SGPT) <5 37 (A)  <5   (A) Abnormal value         Comments are available for some flowsheets but are not being displayed.                 Site   Date Value Ref Range Status   09/10/2022 Right Brachial  Final     Misael's Test   Date Value Ref Range Status   09/10/2022 Positive  Final     pH, Arterial   Date Value Ref Range Status   09/10/2022 7.376 7.350 - 7.450 pH units Final     pCO2, Arterial   Date Value Ref Range Status   09/10/2022 35.1 35.0 - 45.0 mm Hg Final     pO2, Arterial   Date Value Ref Range Status   09/10/2022 101.0 83.0 - 108.0 mm Hg Final     Comment:     83 Value above reference range     HCO3, Arterial   Date Value Ref Range Status   09/10/2022 20.6 20.0 - 26.0 mmol/L Final     Base Excess, Arterial   Date Value Ref Range Status   09/10/2022 -4.1 (L) 0.0 - 2.0 mmol/L Final     O2 Saturation, Arterial    Date Value Ref Range Status   09/10/2022 98.7 94.0 - 99.0 % Final     Hemoglobin, Blood Gas   Date Value Ref Range Status   09/10/2022 9.6 (L) 13.5 - 17.5 g/dL Final     Comment:     84 Value below reference range     Hematocrit, Blood Gas   Date Value Ref Range Status   09/10/2022 29.5 (L) 38.0 - 51.0 % Final     Comment:     84 Value below reference range     Oxyhemoglobin   Date Value Ref Range Status   09/10/2022 97.0 94 - 99 % Final     Methemoglobin   Date Value Ref Range Status   09/10/2022 0.50 0.00 - 3.00 % Final     Carboxyhemoglobin   Date Value Ref Range Status   09/10/2022 1.2 0 - 5 % Final     CO2 Content   Date Value Ref Range Status   09/10/2022 21.6 (L) 22 - 33 mmol/L Final     Barometric Pressure for Blood Gas   Date Value Ref Range Status   09/10/2022 728 mmHg Final     Modality   Date Value Ref Range Status   09/10/2022 Ventilator  Final     FIO2   Date Value Ref Range Status   09/10/2022 40 % Final           Medication Review:   Scheduled Medications:  ALPRAZolam, 2 mg, Oral, Q8H  chlorhexidine, 15 mL, Mouth/Throat, Q12H  ethyl alcohol, 1 application, Nasal, BID  famotidine, 20 mg, Intravenous, Q12H  gabapentin, 800 mg, Oral, TID  heparin (porcine), 5,000 Units, Subcutaneous, Q8H  levETIRAcetam, 750 mg, Intravenous, Q12H  piperacillin-tazobactam, 3.375 g, Intravenous, Q8H  sodium chloride, 10 mL, Intravenous, Q12H      Continuous infusions:  LORazepam, 0.5-10 mg/hr, Last Rate: Stopped (09/09/22 1435)  Midazolam 1 mg/mL 100mL NS, 1-10 mg/hr, Last Rate: Stopped (09/10/22 1551)  norepinephrine, 0.02-0.3 mcg/kg/min, Last Rate: Stopped (09/08/22 1401)  propofol, 5-60 mcg/kg/min, Last Rate: 35 mcg/kg/min (09/11/22 1323)  sodium chloride, 75 mL/hr, Last Rate: 75 mL/hr (09/11/22 0914)        Assessment:   Acute respiratory failure, inability to protect airway related to overdose  Overdose of unknown substance, notable for possession of gabapentin tablets on ED admission  Urine toxicology positive for  multiple substances: Methamphetamines, amphetamines, benzodiazepines, methadone  Bilateral lower lobe pneumonia, likely aspiration  Hepatitis C  Mild normocytic anemia  History of anxiety, fibromyalgia  History of drug abuse        Plan:   NSedation: Propofol, fentanyl  Sedation adjusted for Rass goal of 0 to -1  UDS notable for amphetamine, methamphetamine, benzodiazepines, methadone.  Notable for possession of gabapentin tablets on admission.  On [facility]  Neurology following.  EEG negative for ongoing seizure activity.  Cont scheduled Xanax.    Agree with plans to lower sedation and attempt SBT/SBT with possible extubation if tolerated.  RN slowly titrated down/off sedation.    Map goal 65 mmHg or greater.     FiO2 (%):  [35 %] 35 %  S RR:  [25] 25  PEEP/CPAP (cm H2O):  [5 cm H20] 5 cm H20  MAP (cm H2O):  [10-19] 13  Ventilator settings and graphics reviewed.  Appears compliant with current settings.  Recent ABG reviewed.  Site   Date Value Ref Range Status   09/10/2022 Right Brachial  Final     Misael's Test   Date Value Ref Range Status   09/10/2022 Positive  Final     pH, Arterial   Date Value Ref Range Status   09/10/2022 7.376 7.350 - 7.450 pH units Final     pCO2, Arterial   Date Value Ref Range Status   09/10/2022 35.1 35.0 - 45.0 mm Hg Final     pO2, Arterial   Date Value Ref Range Status   09/10/2022 101.0 83.0 - 108.0 mm Hg Final     Comment:     83 Value above reference range     HCO3, Arterial   Date Value Ref Range Status   09/10/2022 20.6 20.0 - 26.0 mmol/L Final     Base Excess, Arterial   Date Value Ref Range Status   09/10/2022 -4.1 (L) 0.0 - 2.0 mmol/L Final     O2 Saturation, Arterial   Date Value Ref Range Status   09/10/2022 98.7 94.0 - 99.0 % Final     Hemoglobin, Blood Gas   Date Value Ref Range Status   09/10/2022 9.6 (L) 13.5 - 17.5 g/dL Final     Comment:     84 Value below reference range     Hematocrit, Blood Gas   Date Value Ref Range Status   09/10/2022 29.5 (L) 38.0 - 51.0 % Final      Comment:     84 Value below reference range     Oxyhemoglobin   Date Value Ref Range Status   09/10/2022 97.0 94 - 99 % Final     Methemoglobin   Date Value Ref Range Status   09/10/2022 0.50 0.00 - 3.00 % Final     Carboxyhemoglobin   Date Value Ref Range Status   09/10/2022 1.2 0 - 5 % Final     CO2 Content   Date Value Ref Range Status   09/10/2022 21.6 (L) 22 - 33 mmol/L Final     Barometric Pressure for Blood Gas   Date Value Ref Range Status   09/10/2022 728 mmHg Final     Modality   Date Value Ref Range Status   09/10/2022 Ventilator  Final     FIO2   Date Value Ref Range Status   09/10/2022 40 % Final       Titrating settings as tolerated.  SAT today d/w nurse  MRSA PCR positive.  Imaging notable for bibasilar infiltrates, concern for aspiration in the setting of recent seizure  On IV Zosyn    GI prophylaxis: Famotidine  Nutrition: Tube feeds    Antibiotics: IV Zosyn  Some concern for aspiration due to recent altered mentation/seizure activity prior to admission  Cultures:   Microbiology Results (last 10 days)       Procedure Component Value - Date/Time    MRSA Screen, PCR (Inpatient) - Swab, Nares [[id number]]  (Abnormal) Collected: 09/08/22 1027    Lab Status: Final result Specimen: Swab from Nares Updated: 09/08/22 1314     MRSA PCR MRSA Detected    Narrative:      The negative predictive value of this diagnostic test is high and should only be used to consider de-escalating anti-MRSA therapy. A positive result may indicate colonization with MRSA and must be correlated clinically.    COVID PRE-OP / PRE-PROCEDURE SCREENING ORDER (NO ISOLATION) - Swab, Nasopharynx [[id number]]  (Normal) Collected: 09/08/22 0212    Lab Status: Final result Specimen: Swab from Nasopharynx Updated: 09/08/22 0234    Narrative:      The following orders were created for panel order COVID PRE-OP / PRE-PROCEDURE SCREENING ORDER (NO ISOLATION) - Swab, Nasopharynx.  Procedure                               Abnormality         Status                      ---------                               -----------         ------                     COVID-19 and FLU A/B PCR...[[id number]]  Normal              Final result                 Please view results for these tests on the individual orders.    COVID-19 and FLU A/B PCR - Swab, Nasopharynx [[id number]]  (Normal) Collected: 09/08/22 0212    Lab Status: Final result Specimen: Swab from Nasopharynx Updated: 09/08/22 0234     COVID19 Not Detected     Influenza A PCR Not Detected     Influenza B PCR Not Detected    Narrative:      Fact sheet for providers: https://www.fda.gov/media/893236/download    Fact sheet for patients: https://www.fda.gov/media/872297/download    Test performed by PCR.    Blood Culture - Blood, Blood, Venous Line [[id number]]  (Abnormal) Collected: 09/08/22 0205    Lab Status: Final result Specimen: Blood, Venous Line Updated: 09/10/22 0619     Blood Culture Corynebacterium species     Isolated from Anaerobic Bottle     Gram Stain Anaerobic Bottle Gram positive bacilli    Narrative:      Probable contaminant requires clinical correlation, susceptibility not performed unless requested by physician.    Blood culture does not meet the specified criteria for PCR identification.  If pregnant, immunocompromised, or clinical concern for meningitis, call lab to run BCID for Listeria monocytogenes.    Blood Culture ID, PCR - Blood, Blood, Venous Line [[id number]]  (Normal) Collected: 09/08/22 0205    Lab Status: Final result Specimen: Blood, Venous Line Updated: 09/09/22 1801     BCID, PCR Negative by BCID PCR. Culture to Follow.    Blood Culture - Blood, Blood, Central Line [[id number]]  (Normal) Collected: 09/08/22 0129    Lab Status: Preliminary result Specimen: Blood, Central Line Updated: 09/11/22 0146     Blood Culture No growth at 3 days          DVT prophylaxis: Heparin subcutaneous        Remains critically ill with hypoxic respiratory failure   Adjusted sedation and vent settings      Kyle Blackburn MD  09/11/22  13:39 EDT    Case d/w nurse and team   This service is provided via audio video tele medicine   I am in [place] and patient is in [facility]       Electronically signed by Kyle Blackburn MD at 09/12/22 4090

## 2022-09-12 NOTE — PROGRESS NOTES
[facility] HOSPITALIST PROGRESS NOTE     Patient Identification:  Name:  Lillian Ayon  Age:  36 y.o.  Sex:  female  :  1985  MRN:  [id number]  Visit Number:  [id number]  ROOM: [address]     Primary Care Provider:  Jason Mathew MD     Date of Admission: 2022    Length of stay in inpatient status:  4    Subjective     Chief Compliant:    Chief Complaint   Patient presents with   • Drug Overdose       This am again having significant bouts of agitation with any attempt to wean sedation. On fentanyl and propofol gtt with mod-sedation as well as scheduled PO xanax. With attempts to wean she requires prn doses ativan as she becomes more agitated but still unable to follow commands and quickly tries to self extubate when awakening.     Having occasional emesis requiring prn suctioning and bedside nurse noted TF appearing material during ET suction.    ROS limited given intubation/sedation      Objective     Current Hospital Meds:  amoxicillin-clavulanate, 500 mg, Per G Tube, Q8H  chlordiazePOXIDE, 25 mg, Per G Tube, Q6H  chlorhexidine, 15 mL, Mouth/Throat, Q12H  ethyl alcohol, 1 application, Nasal, BID  famotidine, 20 mg, Intravenous, Q12H  gabapentin, 800 mg, Oral, TID  heparin (porcine), 5,000 Units, Subcutaneous, Q8H  sodium chloride, 10 mL, Intravenous, Q12H  sodium chloride, 10 mL, Intravenous, Q12H  sodium chloride, 10 mL, Intravenous, Q12H  sodium chloride, 10 mL, Intravenous, Q12H    fentaNYL Citrate,   norepinephrine, 0.02-0.3 mcg/kg/min, Last Rate: Stopped (22 1401)  propofol, 5-60 mcg/kg/min, Last Rate: 60 mcg/kg/min (22 1649)  sodium chloride, 75 mL/hr, Last Rate: 75 mL/hr (22 1009)      Current Antimicrobial Therapy:  Anti-Infectives (From admission, onward)    Ordered     Dose/Rate Route Frequency Start Stop    22 0955  amoxicillin-clavulanate (AUGMENTIN) 200-28.5 MG/5ML suspension 500 mg        Ordering Provider: Bolivar Cervantes MD    500 mg Per  G Tube Every 8 Hours 09/12/22 1100 09/14/22 0259    09/08/22 1216  piperacillin-tazobactam (ZOSYN) IVPB 3.375 g in 100 mL NS VTB        Ordering Provider: Freddie Nava MD    3.375 g  over 30 Minutes Intravenous Once 09/08/22 1315 09/08/22 1521    09/08/22 0824  cefepime (MAXIPIME) 2 g/100 mL 0.9% NS (mbp)        Ordering Provider: Freddie Nava MD    2 g  200 mL/hr over 30 Minutes Intravenous Once 09/08/22 0900 09/08/22 0947    09/08/22 0626  metroNIDAZOLE (FLAGYL) IVPB 500 mg        Ordering Provider: Jareth Mendez MD    500 mg  over 30 Minutes Intravenous Once 09/08/22 0628 09/08/22 0710    09/07/22 2233  cefepime (MAXIPIME) 2 g/100 mL 0.9% NS (mbp)        Ordering Provider: Damaris Escalante DO    2 g  200 mL/hr over 30 Minutes Intravenous Once 09/07/22 2235 09/08/22 0246        Current Diuretic Therapy:  Diuretics (From admission, onward)    None        ----------------------------------------------------------------------------------------------------------------------  Vital Signs:  Temp:  [98.5 °F (36.9 °C)-99.6 °F (37.6 °C)] 99.6 °F (37.6 °C)  Heart Rate:  [] 74  Resp:  [25-29] 25  BP: (102-131)/(42-95) 128/81  FiO2 (%):  [35 %] 35 %  SpO2:  [93 %-100 %] 100 %  on   ;   Device (Oxygen Therapy): ventilator  Body mass index is 39.01 kg/m².    Wt Readings from Last 3 Encounters:   09/12/22 110 kg (241 lb 9.6 oz)   06/26/22 99.8 kg (220 lb)   04/28/22 90.7 kg (200 lb)     Intake & Output (last 3 days)       09/09 0701  09/10 0700 09/10 0701 09/11 0700 09/11 0701 09/12 0700 09/12 0701 09/13 0700    I.V. (mL/kg) 3837.2 (35.2) 3347.3 (30.4) 3630.4 (33) 2554.4 (23.2)    Other 100 428 463     NG/GT 88 1153 1126     IV Piggyback 600 100 100     Total Intake(mL/kg) 4625.2 (42.4) 5028.3 (45.7) 5319.4 (48.4) 2554.4 (23.2)    Urine (mL/kg/hr) 1025 (0.4) 4225 (1.6) 5730 (2.2) 2880 (2.3)    Stool 0 0 0     Total Output 1025 4225 5730 2880    Net +3600.2 +803.3 -410.6 -325.6                NPO Diet NPO  Type: Strict NPO  ----------------------------------------------------------------------------------------------------------------------  Physical Exam  Vitals and nursing note reviewed.   Constitutional:       Appearance: She is obese.      Comments: Intubated/sedated w/soft wrist restraints   HENT:      Head: Normocephalic and atraumatic.      Mouth/Throat:      Pharynx: Oropharynx is clear.      Comments: Large volume clear oropharyngeal secretions  Eyes:      General: No scleral icterus.     Conjunctiva/sclera: Conjunctivae normal.   Cardiovascular:      Rate and Rhythm: Normal rate and regular rhythm.      Pulses: Normal pulses.      Heart sounds: No murmur heard.  Pulmonary:      Breath sounds: No wheezing.      Comments: On MV, b/l mechanical breath sounds  Abdominal:      Palpations: Abdomen is soft.      Tenderness: There is no guarding.   Musculoskeletal:      Right lower leg: No edema.      Left lower leg: No edema.   Skin:     General: Skin is warm and dry.   Neurological:      Mental Status: She is disoriented.      Comments: Moves all 4 extremities spontaneously    Psychiatric:      Comments: Unable to assess       ----------------------------------------------------------------------------------------------------------------------    ----------------------------------------------------------------------------------------------------------------------  LABS:    CBC and coagulation:  Results from last 7 days   Lab Units 09/12/22  0103 09/11/22  0040 09/10/22  0111 09/09/22  0042 09/08/22  0003   PROCALCITONIN ng/mL  --   --   --   --  0.06   LACTATE mmol/L  --   --   --   --  1.8   WBC 10*3/mm3 6.92 6.26 6.20 6.48 22.57*   HEMOGLOBIN g/dL 9.0* 8.8* 9.0* 8.9* 11.5*   HEMATOCRIT % 26.6* 26.3* 28.4* 26.7* 35.8   MCV fL 87.5 88.3 91.3 91.1 86.5   MCHC g/dL 33.8 33.5 31.7 33.3 32.1   PLATELETS 10*3/mm3 196 171 165 166 266   INR   --   --   --   --  1.02     Acid/base balance:  Results from last 7 days   Lab  Units 09/10/22  0503 09/09/22  0923 09/08/22  0918 09/08/22  0204 09/08/22  0202 09/07/22  2327   PH, ARTERIAL pH units 7.376 7.392 7.414 7.329* 7.301* 7.345*   PO2 ART mm Hg 101.0 94.7 84.3 172.0* 66.1* 99.0   PCO2, ARTERIAL mm Hg 35.1 35.2 35.2 42.8 47.1* 38.2   HCO3 ART mmol/L 20.6 21.3 22.5 22.5 23.2 20.9     Renal and electrolytes:  Results from last 7 days   Lab Units 09/12/22  0103 09/11/22  0040 09/10/22  1409 09/10/22  0111 09/09/22  0042 09/08/22  1005 09/08/22  0003   SODIUM mmol/L 137 136  --  139 136  --  139   POTASSIUM mmol/L 3.4* 3.5 4.0 3.6 3.8  --  3.3*   MAGNESIUM mg/dL  --   --   --   --   --  2.2 2.2   CHLORIDE mmol/L 103 104  --  109* 108*  --  104   CO2 mmol/L 24.4 22.5  --  18.8* 19.6*  --  22.1   BUN mg/dL 5* 4*  --  5* 6  --  8   CREATININE mg/dL 0.38* 0.38*  --  0.46* 0.59  --  0.71   CALCIUM mg/dL 8.4* 8.2*  --  7.6* 7.8*  --  8.9   PHOSPHORUS mg/dL  --   --   --   --   --   --  2.6   GLUCOSE mg/dL 100* 119*  --  71 84  --  173*     Estimated Creatinine Clearance: 262.1 mL/min (A) (by C-G formula based on SCr of 0.38 mg/dL (L)).    Liver and pancreatic function:  Results from last 7 days   Lab Units 09/12/22  0103 09/11/22  0040 09/10/22  0111 09/09/22  0042 09/08/22  0003   ALBUMIN g/dL 2.53* 2.54* 2.61* 2.50* 4.31   BILIRUBIN mg/dL 0.2 0.2 0.2 0.2 0.4   ALK PHOS U/L 60 59 63 61 91   AST (SGOT) U/L <5 53* 81* 10 47*   ALT (SGPT) U/L <5 <5 37* <5 26   AMMONIA umol/L  --   --   --   --  25     Endocrine function:  No results found for: HGBA1C  Point of care bedside glucose levels:  Results from last 7 days   Lab Units 09/10/22  0802 09/10/22  0654 09/08/22  0229 09/07/22  2258   GLUCOSE mg/dL 93 64* 143* 224*     Glucose levels from the [facility]:  Results from last 7 days   Lab Units 09/12/22  0103 09/11/22  0040 09/10/22  0111 09/09/22  0042 09/08/22  0003   GLUCOSE mg/dL 100* 119* 71 84 173*     Lab Results   Component Value Date    TSH 0.492 09/08/2022     Cardiac:  Results from last 7 days    Lab Units 09/11/22  0040 09/10/22  0111 09/09/22  0042 09/08/22  1005 09/08/22  0003   CK TOTAL U/L 2,278* 4,694* 7,811* 8,122* 1,775*   TROPONIN T ng/mL  --   --   --   --  <0.010       Cultures:  Lab Results   Component Value Date    COLORU Yellow 09/08/2022    CLARITYU Cloudy (A) 09/08/2022    PHUR 5.5 09/08/2022    GLUCOSEU 500 mg/dL (2+) (A) 09/08/2022    KETONESU 40 mg/dL (2+) (A) 09/08/2022    BLOODU Large (3+) (A) 09/08/2022    NITRITEU Negative 09/08/2022    LEUKOCYTESUR Negative 09/08/2022    BILIRUBINUR Negative 09/08/2022    UROBILINOGEN 0.2 E.U./dL 09/08/2022    RBCUA 0-2 09/08/2022    WBCUA 0-2 09/08/2022    BACTERIA None Seen 09/08/2022     Microbiology Results (last 10 days)     Procedure Component Value - Date/Time    Respiratory Culture - Lavage, Alveoli [[id number]] Collected: 09/12/22 1531    Lab Status: Preliminary result Specimen: Lavage from Alveoli Updated: 09/12/22 1733     Gram Stain Moderate (3+) WBCs seen      Rare (1+) Epithelial cells seen      No organisms seen    Blood Culture - Blood, Arm, Right [[id number]]  (Normal) Collected: 09/10/22 1409    Lab Status: Preliminary result Specimen: Blood from Arm, Right Updated: 09/12/22 1546     Blood Culture No growth at 2 days    Blood Culture - Blood, Arm, Left [[id number]]  (Normal) Collected: 09/10/22 1409    Lab Status: Preliminary result Specimen: Blood from Arm, Left Updated: 09/12/22 1546     Blood Culture No growth at 2 days    MRSA Screen, PCR (Inpatient) - Swab, Nares [[id number]]  (Abnormal) Collected: 09/08/22 1027    Lab Status: Final result Specimen: Swab from Nares Updated: 09/08/22 1314     MRSA PCR MRSA Detected    Narrative:      The negative predictive value of this diagnostic test is high and should only be used to consider de-escalating anti-MRSA therapy. A positive result may indicate colonization with MRSA and must be correlated clinically.    COVID PRE-OP / PRE-PROCEDURE SCREENING ORDER (NO ISOLATION) - Swab, Nasopharynx  [[id number]]  (Normal) Collected: 09/08/22 0212    Lab Status: Final result Specimen: Swab from Nasopharynx Updated: 09/08/22 0234    Narrative:      The following orders were created for panel order COVID PRE-OP / PRE-PROCEDURE SCREENING ORDER (NO ISOLATION) - Swab, Nasopharynx.  Procedure                               Abnormality         Status                     ---------                               -----------         ------                     COVID-19 and FLU A/B PCR...[[id number]]  Normal              Final result                 Please view results for these tests on the individual orders.    COVID-19 and FLU A/B PCR - Swab, Nasopharynx [[id number]]  (Normal) Collected: 09/08/22 0212    Lab Status: Final result Specimen: Swab from Nasopharynx Updated: 09/08/22 0234     COVID19 Not Detected     Influenza A PCR Not Detected     Influenza B PCR Not Detected    Narrative:      Fact sheet for providers: https://www.fda.gov/media/778263/download    Fact sheet for patients: https://www.fda.gov/media/712866/download    Test performed by PCR.    Blood Culture - Blood, Blood, Venous Line [[id number]]  (Abnormal) Collected: 09/08/22 0205    Lab Status: Final result Specimen: Blood, Venous Line Updated: 09/10/22 0619     Blood Culture Corynebacterium species     Isolated from Anaerobic Bottle     Gram Stain Anaerobic Bottle Gram positive bacilli    Narrative:      Probable contaminant requires clinical correlation, susceptibility not performed unless requested by physician.    Blood culture does not meet the specified criteria for PCR identification.  If pregnant, immunocompromised, or clinical concern for meningitis, call lab to run BCID for Listeria monocytogenes.    Blood Culture ID, PCR - Blood, Blood, Venous Line [[id number]]  (Normal) Collected: 09/08/22 0205    Lab Status: Final result Specimen: Blood, Venous Line Updated: 09/09/22 1801     BCID, PCR Negative by BCID PCR. Culture to Follow.    Blood Culture -  Blood, Blood, Central Line [[id number]]  (Normal) Collected: 09/08/22 0129    Lab Status: Preliminary result Specimen: Blood, Central Line Updated: 09/12/22 0147     Blood Culture No growth at 4 days          Lab Results   Component Value Date    PREGTESTUR Negative 09/08/2022     Pain Management Panel     Pain Management Panel Latest Ref Rng & Units 9/8/2022 5/25/2021    AMPHETAMINES SCREEN, URINE Negative Positive(A) Positive(A)    BARBITURATES SCREEN Negative Negative Negative    BENZODIAZEPINE SCREEN, URINE Negative Positive(A) Negative    BUPRENORPHINEUR Negative Negative Negative    COCAINE SCREEN, URINE Negative Negative Negative    METHADONE SCREEN, URINE Negative Positive(A) Negative    METHAMPHETAMINEUR Negative Positive(A) -          I have personally looked at the labs and they are summarized above.  ----------------------------------------------------------------------------------------------------------------------  Detailed radiology reports for the last 24 hours:    Imaging Results (Last 24 Hours)     Procedure Component Value Units Date/Time    XR Chest 1 View [[id number]] Collected: 09/12/22 0719     Updated: 09/12/22 0721    Narrative:      CR Chest 1 Vw    INDICATION:   Intubation with vomiting and possible aspiration. Poisoning.     COMPARISON:    Chest x-ray 9/11/2022.    FINDINGS:  Portable AP view(s) of the chest.    Endotracheal tube is satisfactory. There is a nasogastric tube that coils in the stomach. The lung volumes are quite low.    There is probably normal cardiac silhouette size. There is at least crowding of parenchymal markings but probably also some superimposed central vascular congestion. There is airspace disease at the left greater than right lung base that is mildly worse.  This could reflect aspiration or pneumonia as suspected clinically but given the low lung volumes, atelectasis also in the differential. No pneumothorax. Cannot exclude some layering pleural fluid.       Impression:        1. Tubes and lines are not changed.  2. Very low lung volumes with bilateral lower lobe airspace disease mildly worse. This could be due to aspiration or pneumonia but atelectasis in the differential given the low lung volumes.  3. Mild worsening of volume status.    Signer Name: Na Carson MD   Signed: 9/12/2022 7:19 AM   Workstation Name: [email or web address]    [facility]        I have personally looked at the radiology images over the last 24hrs, my personal read below:    CXR reviewed, significant for small lung volumes and b/l diffuse opacities slightly worsened. Difficult to interpret given chest wall diameter and atelectasis but no overt effusions    Assessment & Plan      #Acute respiratory failure 2/2 drug overdose   #Aspiration pneumonia  - Intubated in emergency room for airway protection   - CT chest/abdomen/pelvis/head unrevealing other than bibasilar infiltrates likely from aspiration vs atelectasis however given observed emesis with TF suctioned from ETT, will cont abx coverage   - Transitioned to zosyn as cefepime can lower seizure threshold. Mild allergy to cephalosporins listed but tolerated cefepime in ED. To finish abx 9/13  - Added respiratory PAL cx, repeat bcx given corynebacterium likely contaminate   - Will follow respiratory culture and blood cultures until finalization   - Sedation plan as below  - Pulmonology following. Appreciate recs    #Polysubstance drug use   #Benzo dependence with withdrawal seizure  - STAT EEG showed nonspecific moderate degree slowing but no evidence of epileptic activity, tele neuro seen initially  - UDS positive for benzodiazepine, methamphetamine, methadone. There is also concern for gabapentin misuse and overdose  -Pulm team stopped scheduled xanax and changed to librium this am w/cont prn ativan for agitation when weaning propofol. Keppra also stopped today, agree given continuous benzo dosing  -Given difficulty safely  weaning sedation will be high-risk extubation     #Precedex induced bradycardia   - noted in ED but unsure if patient bolused vs normal slow titration infusion  - Avoiding regardless     #Elevated CK  - Preserved renal function  - CK: max ~8K and now consistently declining  - Cont IVF w/TF holding. Renal function remains intact. Can stop if hypervolemic on exam in future     #Hypoglycemia  - Episode on 9/10. Has resolved after initiation of tube feeds.      #Hx of hepatitis C  #Mild hepatocellular transaminitis   - Ammonia level nml. No history of cirrhosis. Do not suspect hepatic encephalopathy at this point   - Family reported they believe she is s/p treatment for the hepatitis C.   - Repeat hepatitis panel still positive for hepatitis C Ab. Outpatient viral load may be warranted depending on prior treatments.   - HIV screen negative      #Iron deficiency anemia  - Baseline hgb appears to be 11-13  - Hgb at presentaiton 11.5  - No active signs of bleeding    - B12 & folate wnl, iron studies consistent w/iron def anemia  - Start daily PO iron, no ESRD or HF to justify IV iron     #Tobacco use  - Recommend cessation   - Will offer NRT once alert/extubated     F: NPO, Tfs via NG  A: Fentanyl   S: Propofol  T: pLOV  H: HOB elevated  U: Famotadine   G: PRN  S: Daily  B: PRN  I: PIVs, ETT  D: Augmentin (stop 9/13)        VTE Prophylaxis:   Mechanical Order History:     None      Pharmalogical Order History:      Ordered     Dose Route Frequency Stop    09/08/22 0906  heparin (porcine) 5000 UNIT/ML injection 5,000 Units         5,000 Units SC Every 8 Hours Scheduled --                  Disposition: Cont ICU level care    I have reviewed any copied/forwarded text or data, verified its accuracy, and updated as necessary above.    Dez Mccord MD  [facility]  09/12/22  18:17 EDT

## 2022-09-12 NOTE — PROGRESS NOTES
PROGRESS NOTE  Patient Name: Lillian Ayon  Age/Sex: 36 y.o. female  : 1985  MRN: [id number]    Date of Admission: 2022  Date of Encounter Visit: 22   LOS: 4 days   Patient Care Team:  Jason Mathew MD as PCP - General (Family Medicine)    Chief Complaint: Patient was admitted with respiratory failure due to drug overdose and was intubated for airway protection    Hospital course: Patient admitted with drug overdose, currently sedated, orally intubated, on the ventilator.  He failed his weaning trial yesterday because of the agitation and had to be back on sedation while on the ventilator.  Was ruled out for seizure currently on Xanax scheduled and as needed IV Ativan for benzo withdrawal.  She had no witnessed activity since presentation, there was a questionable seizure-like activity reported at the [facility] facility before she was brought and.  She has been on Keppra since  She is getting some Ativan scheduled and Xanax scheduled, her drug screen came back positive for several substances  Patient is still on 50 of propofol 200 and fentanyl and she still wakes up to stimulation and was able to follow commands.  She has been on antibiotic with Zosyn day #4 her white count was elevated at 22 on presentation but this down to normal for more than 2 days now.  Her blood gas and her labs were reviewed and they were all within okay range.  Her chest x-ray showed some underinflated lungs, patient would benefit from slightly higher PEEP.  Patient has IV drug abuse, the echocardiogram did not show any abnormal vegetation and her blood cultures were negative except for 1 culture that is likely a contaminant.    REVIEW OF SYSTEMS:   CONSTITUTIONAL: no fever or chills  Limited system review otherwise while sedated and orally intubated and on the ventilator  Ventilator/Non-Invasive Ventilation Settings (From admission, onward)             Start     Ordered    22 1001  Ventilator - AC/VC; Other;  "25; 40%; SpO2 >/= 90%; 5; mL; 400  Continuous        Question Answer Comment   Vent Mode AC/VC    Rate Other    Rate 25    FiO2 40%    Titrate FiO2 to Keep SpO2 >/= 90%    PEEP 5    Tidal Volume mL            09/08/22 1001    09/07/22 2333  Ventilator - AC/VC; Other; 25; 100%; SpO2 >/= 90%; 5; mL; 400  Continuous,   Status:  Canceled        Question Answer Comment   Vent Mode AC/VC    Rate Other    Rate 25    FiO2 100%    Titrate FiO2 to Keep SpO2 >/= 90%    PEEP 5    Tidal Volume mL            09/07/22 2333                  Vital Signs  Temp:  [98.5 °F (36.9 °C)-99.5 °F (37.5 °C)] 99.5 °F (37.5 °C)  Heart Rate:  [] 82  Resp:  [24-26] 25  BP: (102-131)/(42-95) 131/93  FiO2 (%):  [35 %] 35 %  SpO2:  [93 %-100 %] 96 %  on    Device (Oxygen Therapy): ventilator    Intake/Output Summary (Last 24 hours) at 9/12/2022 0856  Last data filed at 9/12/2022 0600  Gross per 24 hour   Intake 5319.36 ml   Output 5730 ml   Net -410.64 ml     Flowsheet Rows    Flowsheet Row First Filed Value   Admission Height 167.6 cm (66\") Documented at 09/07/2022 2116   Admission Weight 99.8 kg (220 lb) Documented at 09/07/2022 2116        Body mass index is 39.01 kg/m².      09/10/22  0441 09/11/22  0500 09/12/22  0500   Weight: 109 kg (240 lb 8.4 oz) 110 kg (242 lb 8.1 oz) 110 kg (241 lb 9.6 oz)       Physical Exam:  GEN: Sedated but arousable, orally intubated, on the ventilator  EYES:   Sclerae clear. No icterus. PERRL.  Tracks with eyes properly  ENT:   External ears/nose normal, no oral lesions, no thrush, mucous membranes moist, oral ET tube  NECK:  Supple, midline trachea, no JVD  LUNGS: Normal chest on inspection, CTAB, no wheezes. No rhonchi. No crackles. Respirations regular, even and unlabored.   CV:  Regular rhythm and rate. Normal S1/S2. No murmurs, gallops, or rubs noted.  ABD:  Soft, nontender and nondistended. Normal bowel sounds. No guarding  EXT:  Moves all extremities well. No cyanosis. No redness.  Trace " edema.  She has evidence of Doubtre needle tracks  Skin: Dry, intact, no bleeding    Results Review:    Results From Last 14 Days   Lab Units 09/08/22  0003   LACTATE mmol/L 1.8     Results from last 7 days   Lab Units 09/12/22  0103 09/11/22  0040 09/10/22  1409 09/10/22  0111 09/09/22  0042 09/08/22  0003   SODIUM mmol/L 137 136  --  139 136 139   POTASSIUM mmol/L 3.4* 3.5 4.0 3.6 3.8 3.3*   CHLORIDE mmol/L 103 104  --  109* 108* 104   CO2 mmol/L 24.4 22.5  --  18.8* 19.6* 22.1   BUN mg/dL 5* 4*  --  5* 6 8   CREATININE mg/dL 0.38* 0.38*  --  0.46* 0.59 0.71   CALCIUM mg/dL 8.4* 8.2*  --  7.6* 7.8* 8.9   AST (SGOT) U/L <5 53*  --  81* 10 47*   ALT (SGPT) U/L <5 <5  --  37* <5 26   ANION GAP mmol/L 9.6 9.5  --  11.2 8.4 12.9   ALBUMIN g/dL 2.53* 2.54*  --  2.61* 2.50* 4.31     Results from last 7 days   Lab Units 09/11/22  0040 09/10/22  0111 09/09/22  0042 09/08/22  1005 09/08/22  0003   CK TOTAL U/L 2,278* 4,694* 7,811*   < > 1,775*   TROPONIN T ng/mL  --   --   --   --  <0.010    < > = values in this interval not displayed.     Results from last 7 days   Lab Units 09/08/22  0003   TSH uIU/mL 0.492         Results from last 7 days   Lab Units 09/12/22  0103 09/11/22  0040 09/10/22  0111 09/09/22  0042 09/08/22  0003   WBC 10*3/mm3 6.92 6.26 6.20 6.48 22.57*   HEMOGLOBIN g/dL 9.0* 8.8* 9.0* 8.9* 11.5*   HEMATOCRIT % 26.6* 26.3* 28.4* 26.7* 35.8   PLATELETS 10*3/mm3 196 171 165 166 266   MCV fL 87.5 88.3 91.3 91.1 86.5   NEUTROPHIL % % 59.4 61.2 62.9 74.3 92.7*   LYMPHOCYTE % % 28.3 27.6 26.0 18.8* 2.4*   MONOCYTES % % 7.7 6.9 7.7 6.2 4.3*   EOSINOPHIL % % 4.0 3.7 2.6 0.2* 0.0*   BASOPHIL % % 0.3 0.3 0.5 0.2 0.2   IMM GRAN % % 0.3 0.3 0.3 0.3 0.4     Results from last 7 days   Lab Units 09/08/22  0003   INR  1.02   APTT seconds 25.1*     Results from last 7 days   Lab Units 09/08/22  1005 09/08/22  0003   MAGNESIUM mg/dL 2.2 2.2           Invalid input(s): LDLCALC  Results from last 7 days   Lab Units  09/10/22  0503 09/09/22  0923 09/08/22  0918 09/08/22  0204 09/08/22  0202 09/07/22  2327   PH, ARTERIAL pH units 7.376 7.392 7.414 7.329* 7.301* 7.345*   PCO2, ARTERIAL mm Hg 35.1 35.2 35.2 42.8 47.1* 38.2   PO2 ART mm Hg 101.0 94.7 84.3 172.0* 66.1* 99.0   HCO3 ART mmol/L 20.6 21.3 22.5 22.5 23.2 20.9         Glucose   Date/Time Value Ref Range Status   09/10/2022 0802 93 70 - 130 mg/dL Final     Comment:     Meter: [id number] : [id number] RENÉE ONEILL   09/10/2022 0654 64 (L) 70 - 130 mg/dL Final     Comment:     Meter: [id number] : [id number] leti butain     Results from last 7 days   Lab Units 09/08/22  0003   PROCALCITONIN ng/mL 0.06   LACTATE mmol/L 1.8     Results from last 7 days   Lab Units 09/10/22  1409 09/08/22  0205 09/08/22  0129   BLOODCX  No growth at 24 hours  No growth at 24 hours Corynebacterium species* No growth at 4 days   BCIDPCR   --  Negative by BCID PCR. Culture to Follow.  --      Results from last 7 days   Lab Units 09/08/22  0112   NITRITE UA  Negative   WBC UA /HPF 0-2   BACTERIA UA /HPF None Seen   SQUAM EPITHEL UA /HPF None Seen     Results from last 7 days   Lab Units 09/08/22  0212   COVID19  Not Detected   INFLUENZA B PCR  Not Detected               Imaging:   Imaging Results (All)           I reviewed the patient's new clinical results.  I personally viewed and interpreted the patient's imaging results: Chest x-ray probably was an expiratory film.  The reduced lung volume despite the adequate ABG results.  Patient had some bibasilar infiltrate on the CAT scan more so on the left side consistent with pneumonia/atelectasis        Medication Review:   ALPRAZolam, 2 mg, Oral, Q6H  chlorhexidine, 15 mL, Mouth/Throat, Q12H  ethyl alcohol, 1 application, Nasal, BID  famotidine, 20 mg, Intravenous, Q12H  gabapentin, 800 mg, Oral, TID  heparin (porcine), 5,000 Units, Subcutaneous, Q8H  levETIRAcetam, 750 mg, Intravenous, Q12H  piperacillin-tazobactam, 3.375 g, Intravenous,  Q8H  sodium chloride, 10 mL, Intravenous, Q12H  sodium chloride, 10 mL, Intravenous, Q12H  sodium chloride, 10 mL, Intravenous, Q12H  sodium chloride, 10 mL, Intravenous, Q12H        fentaNYL Citrate,   LORazepam, 0.5-10 mg/hr, Last Rate: Stopped (09/09/22 1435)  Midazolam 1 mg/mL 100mL NS, 1-10 mg/hr, Last Rate: Stopped (09/10/22 1551)  norepinephrine, 0.02-0.3 mcg/kg/min, Last Rate: Stopped (09/08/22 1401)  propofol, 5-60 mcg/kg/min, Last Rate: 50 mcg/kg/min (09/12/22 0800)  sodium chloride, 75 mL/hr, Last Rate: 75 mL/hr (09/11/22 1705)        ASSESSMENT:   1. Acute respiratory failure, inability to protect airway related to overdose  2. Overdose of unknown substance, notable for possession of gabapentin tablets on ED admission  3. Urine toxicology positive for multiple substances: Methamphetamines, amphetamines, benzodiazepines, methadone  4. Bilateral lower lobe pneumonia, likely aspiration  5. Hepatitis C  6. Mild normocytic anemia  7. History of anxiety, fibromyalgia  8. History of drug abuse  9. Mild rhabdomyolysis on presentation, CPK trending down    PLAN:  Summary of recommendations:  · Start patient on scheduled Librium instead of the oral Xanax  · No need for the Keppra, patient needs to be on benzodiazepine to reduce risk of withdrawal seizure  · Patient will be transition to oral antibiotic which can be discontinued tomorrow  · Hopefully on the scheduled Librium we might have a better chance of weaning sedation and liberating from the ventilator, will reassess on a daily basis and proceed once we have a good window  · Continue to track the CK level, she is on adequate IV hydration which will be continued    Discussed with ICU rounding team, with the nursing staff at the bedside, there is no family    Disposition:     Bolivar Cervantes MD  09/12/22  08:56 EDT      Time: Critical care 38 min      Dictated utilizing Dragon dictation

## 2022-09-13 ENCOUNTER — APPOINTMENT (OUTPATIENT)
Dept: GENERAL RADIOLOGY | Facility: HOSPITAL | Age: 37
End: 2022-09-13

## 2022-09-13 LAB
ALBUMIN SERPL-MCNC: 3.07 G/DL (ref 3.5–5.2)
ANION GAP SERPL CALCULATED.3IONS-SCNC: 12.6 MMOL/L (ref 5–15)
BACTERIA SPEC AEROBE CULT: NORMAL
BUN SERPL-MCNC: 4 MG/DL (ref 6–20)
BUN/CREAT SERPL: 10.8 (ref 7–25)
CALCIUM SPEC-SCNC: 8.8 MG/DL (ref 8.6–10.5)
CHLORIDE SERPL-SCNC: 104 MMOL/L (ref 98–107)
CK SERPL-CCNC: 1172 U/L (ref 20–180)
CO2 SERPL-SCNC: 22.4 MMOL/L (ref 22–29)
CREAT SERPL-MCNC: 0.37 MG/DL (ref 0.57–1)
DEPRECATED RDW RBC AUTO: 42.7 FL (ref 37–54)
EGFRCR SERPLBLD CKD-EPI 2021: 135.9 ML/MIN/1.73
ERYTHROCYTE [DISTWIDTH] IN BLOOD BY AUTOMATED COUNT: 13.5 % (ref 12.3–15.4)
GLUCOSE BLDC GLUCOMTR-MCNC: 157 MG/DL (ref 70–130)
GLUCOSE SERPL-MCNC: 84 MG/DL (ref 65–99)
HCT VFR BLD AUTO: 29 % (ref 34–46.6)
HGB BLD-MCNC: 9.3 G/DL (ref 12–15.9)
MCH RBC QN AUTO: 28.1 PG (ref 26.6–33)
MCHC RBC AUTO-ENTMCNC: 32.1 G/DL (ref 31.5–35.7)
MCV RBC AUTO: 87.6 FL (ref 79–97)
PHOSPHATE SERPL-MCNC: 3.7 MG/DL (ref 2.5–4.5)
PLATELET # BLD AUTO: 224 10*3/MM3 (ref 140–450)
PMV BLD AUTO: 8.7 FL (ref 6–12)
POTASSIUM SERPL-SCNC: 3.7 MMOL/L (ref 3.5–5.2)
RBC # BLD AUTO: 3.31 10*6/MM3 (ref 3.77–5.28)
SODIUM SERPL-SCNC: 139 MMOL/L (ref 136–145)
TRIGL SERPL-MCNC: 165 MG/DL (ref 0–150)
WBC NRBC COR # BLD: 6.78 10*3/MM3 (ref 3.4–10.8)

## 2022-09-13 PROCEDURE — 82962 GLUCOSE BLOOD TEST: CPT

## 2022-09-13 PROCEDURE — 84478 ASSAY OF TRIGLYCERIDES: CPT

## 2022-09-13 PROCEDURE — 94003 VENT MGMT INPAT SUBQ DAY: CPT

## 2022-09-13 PROCEDURE — 85027 COMPLETE CBC AUTOMATED: CPT | Performed by: INTERNAL MEDICINE

## 2022-09-13 PROCEDURE — 80069 RENAL FUNCTION PANEL: CPT | Performed by: STUDENT IN AN ORGANIZED HEALTH CARE EDUCATION/TRAINING PROGRAM

## 2022-09-13 PROCEDURE — 94799 UNLISTED PULMONARY SVC/PX: CPT

## 2022-09-13 PROCEDURE — 25010000002 PROPOFOL 10 MG/ML EMULSION: Performed by: INTERNAL MEDICINE

## 2022-09-13 PROCEDURE — 25010000002 LORAZEPAM PER 2 MG: Performed by: STUDENT IN AN ORGANIZED HEALTH CARE EDUCATION/TRAINING PROGRAM

## 2022-09-13 PROCEDURE — 71045 X-RAY EXAM CHEST 1 VIEW: CPT | Performed by: RADIOLOGY

## 2022-09-13 PROCEDURE — 25010000002 MIDAZOLAM PER 1MG

## 2022-09-13 PROCEDURE — 94761 N-INVAS EAR/PLS OXIMETRY MLT: CPT

## 2022-09-13 PROCEDURE — 25010000002 LORAZEPAM PER 2 MG: Performed by: INTERNAL MEDICINE

## 2022-09-13 PROCEDURE — 99233 SBSQ HOSP IP/OBS HIGH 50: CPT | Performed by: STUDENT IN AN ORGANIZED HEALTH CARE EDUCATION/TRAINING PROGRAM

## 2022-09-13 PROCEDURE — 82550 ASSAY OF CK (CPK): CPT | Performed by: STUDENT IN AN ORGANIZED HEALTH CARE EDUCATION/TRAINING PROGRAM

## 2022-09-13 PROCEDURE — 25010000002 VANCOMYCIN 5 G RECONSTITUTED SOLUTION: Performed by: STUDENT IN AN ORGANIZED HEALTH CARE EDUCATION/TRAINING PROGRAM

## 2022-09-13 PROCEDURE — 25010000002 ENOXAPARIN PER 10 MG: Performed by: STUDENT IN AN ORGANIZED HEALTH CARE EDUCATION/TRAINING PROGRAM

## 2022-09-13 PROCEDURE — 25010000002 HEPARIN (PORCINE) PER 1000 UNITS: Performed by: INTERNAL MEDICINE

## 2022-09-13 PROCEDURE — 71045 X-RAY EXAM CHEST 1 VIEW: CPT

## 2022-09-13 PROCEDURE — 92610 EVALUATE SWALLOWING FUNCTION: CPT

## 2022-09-13 RX ORDER — CHOLECALCIFEROL (VITAMIN D3) 125 MCG
5 CAPSULE ORAL NIGHTLY PRN
Status: DISCONTINUED | OUTPATIENT
Start: 2022-09-13 | End: 2022-09-14 | Stop reason: HOSPADM

## 2022-09-13 RX ORDER — MIDAZOLAM HYDROCHLORIDE 2 MG/2ML
INJECTION, SOLUTION INTRAMUSCULAR; INTRAVENOUS
Status: DISPENSED
Start: 2022-09-13 | End: 2022-09-13

## 2022-09-13 RX ORDER — ENOXAPARIN SODIUM 100 MG/ML
40 INJECTION SUBCUTANEOUS NIGHTLY
Status: DISCONTINUED | OUTPATIENT
Start: 2022-09-13 | End: 2022-09-14 | Stop reason: HOSPADM

## 2022-09-13 RX ORDER — ONDANSETRON 4 MG/1
4 TABLET, ORALLY DISINTEGRATING ORAL EVERY 12 HOURS PRN
Status: DISCONTINUED | OUTPATIENT
Start: 2022-09-13 | End: 2022-09-14 | Stop reason: HOSPADM

## 2022-09-13 RX ORDER — NICOTINE 21 MG/24HR
1 PATCH, TRANSDERMAL 24 HOURS TRANSDERMAL
Status: DISCONTINUED | OUTPATIENT
Start: 2022-09-13 | End: 2022-09-14 | Stop reason: HOSPADM

## 2022-09-13 RX ORDER — AMOXICILLIN 250 MG
2 CAPSULE ORAL NIGHTLY
Status: DISCONTINUED | OUTPATIENT
Start: 2022-09-13 | End: 2022-09-14 | Stop reason: HOSPADM

## 2022-09-13 RX ORDER — LORAZEPAM 2 MG/ML
1 INJECTION INTRAMUSCULAR
Status: DISCONTINUED | OUTPATIENT
Start: 2022-09-13 | End: 2022-09-14 | Stop reason: HOSPADM

## 2022-09-13 RX ORDER — ALBUTEROL SULFATE 2.5 MG/3ML
2.5 SOLUTION RESPIRATORY (INHALATION) EVERY 6 HOURS PRN
Status: DISCONTINUED | OUTPATIENT
Start: 2022-09-13 | End: 2022-09-14 | Stop reason: HOSPADM

## 2022-09-13 RX ORDER — DEXTROSE MONOHYDRATE 25 G/50ML
25 INJECTION, SOLUTION INTRAVENOUS
Status: DISCONTINUED | OUTPATIENT
Start: 2022-09-13 | End: 2022-09-14 | Stop reason: HOSPADM

## 2022-09-13 RX ORDER — MIDAZOLAM HYDROCHLORIDE 2 MG/2ML
INJECTION, SOLUTION INTRAMUSCULAR; INTRAVENOUS
Status: COMPLETED
Start: 2022-09-13 | End: 2022-09-13

## 2022-09-13 RX ORDER — MIDAZOLAM HYDROCHLORIDE 1 MG/ML
5 INJECTION INTRAMUSCULAR; INTRAVENOUS ONCE
Status: COMPLETED | OUTPATIENT
Start: 2022-09-13 | End: 2022-09-13

## 2022-09-13 RX ORDER — OXYCODONE HYDROCHLORIDE 5 MG/1
5 TABLET ORAL EVERY 6 HOURS PRN
Status: DISCONTINUED | OUTPATIENT
Start: 2022-09-13 | End: 2022-09-14 | Stop reason: HOSPADM

## 2022-09-13 RX ORDER — ACETAMINOPHEN 500 MG
500 TABLET ORAL EVERY 6 HOURS PRN
Status: DISCONTINUED | OUTPATIENT
Start: 2022-09-13 | End: 2022-09-14 | Stop reason: HOSPADM

## 2022-09-13 RX ORDER — POLYETHYLENE GLYCOL 3350 17 G/17G
17 POWDER, FOR SOLUTION ORAL DAILY PRN
Status: DISCONTINUED | OUTPATIENT
Start: 2022-09-13 | End: 2022-09-14 | Stop reason: HOSPADM

## 2022-09-13 RX ORDER — NICOTINE POLACRILEX 4 MG
15 LOZENGE BUCCAL
Status: DISCONTINUED | OUTPATIENT
Start: 2022-09-13 | End: 2022-09-14 | Stop reason: HOSPADM

## 2022-09-13 RX ADMIN — PROPOFOL 60 MCG/KG/MIN: 10 INJECTION, EMULSION INTRAVENOUS at 06:57

## 2022-09-13 RX ADMIN — CHLORDIAZEPOXIDE HYDROCHLORIDE 25 MG: 25 CAPSULE ORAL at 05:46

## 2022-09-13 RX ADMIN — Medication 10 ML: at 08:54

## 2022-09-13 RX ADMIN — CHLORDIAZEPOXIDE HYDROCHLORIDE 25 MG: 25 CAPSULE ORAL at 17:43

## 2022-09-13 RX ADMIN — FAMOTIDINE 20 MG: 10 INJECTION, SOLUTION INTRAVENOUS at 08:54

## 2022-09-13 RX ADMIN — GABAPENTIN 800 MG: 400 CAPSULE ORAL at 08:54

## 2022-09-13 RX ADMIN — PROPOFOL 60 MCG/KG/MIN: 10 INJECTION, EMULSION INTRAVENOUS at 04:05

## 2022-09-13 RX ADMIN — PROPOFOL 60 MCG/KG/MIN: 10 INJECTION, EMULSION INTRAVENOUS at 00:56

## 2022-09-13 RX ADMIN — LORAZEPAM 2 MG: 2 INJECTION INTRAMUSCULAR; INTRAVENOUS at 08:59

## 2022-09-13 RX ADMIN — CHLORHEXIDINE GLUCONATE 15 ML: 1.2 RINSE ORAL at 08:54

## 2022-09-13 RX ADMIN — Medication: at 05:28

## 2022-09-13 RX ADMIN — ENOXAPARIN SODIUM 40 MG: 40 INJECTION SUBCUTANEOUS at 20:28

## 2022-09-13 RX ADMIN — Medication: at 00:31

## 2022-09-13 RX ADMIN — HEPARIN SODIUM 5000 UNITS: 5000 INJECTION INTRAVENOUS; SUBCUTANEOUS at 15:30

## 2022-09-13 RX ADMIN — FERROUS SULFATE TAB 325 MG (65 MG ELEMENTAL FE) 325 MG: 325 (65 FE) TAB at 08:54

## 2022-09-13 RX ADMIN — MIDAZOLAM HYDROCHLORIDE 5 MG: 1 INJECTION, SOLUTION INTRAMUSCULAR; INTRAVENOUS at 01:13

## 2022-09-13 RX ADMIN — SODIUM CHLORIDE 75 ML/HR: 9 INJECTION, SOLUTION INTRAVENOUS at 12:12

## 2022-09-13 RX ADMIN — VANCOMYCIN HYDROCHLORIDE 2000 MG: 5 INJECTION, POWDER, LYOPHILIZED, FOR SOLUTION INTRAVENOUS at 17:46

## 2022-09-13 RX ADMIN — LORAZEPAM 2 MG: 2 INJECTION INTRAMUSCULAR; INTRAVENOUS at 00:54

## 2022-09-13 RX ADMIN — LORAZEPAM 2 MG: 2 INJECTION INTRAMUSCULAR; INTRAVENOUS at 04:11

## 2022-09-13 RX ADMIN — GABAPENTIN 800 MG: 400 CAPSULE ORAL at 15:30

## 2022-09-13 RX ADMIN — ETHYL ALCOHOL 1 EACH: 62 SWAB TOPICAL at 08:55

## 2022-09-13 RX ADMIN — Medication 10 ML: at 20:45

## 2022-09-13 RX ADMIN — CHLORDIAZEPOXIDE HYDROCHLORIDE 25 MG: 25 CAPSULE ORAL at 23:52

## 2022-09-13 RX ADMIN — OXYCODONE 5 MG: 5 TABLET ORAL at 21:46

## 2022-09-13 RX ADMIN — FAMOTIDINE 20 MG: 10 INJECTION, SOLUTION INTRAVENOUS at 20:28

## 2022-09-13 RX ADMIN — LORAZEPAM 2 MG: 2 INJECTION INTRAMUSCULAR; INTRAVENOUS at 02:35

## 2022-09-13 RX ADMIN — AMOXICILLIN AND CLAVULANATE POTASSIUM 500 MG: 200; 28.5 POWDER, FOR SUSPENSION ORAL at 03:45

## 2022-09-13 RX ADMIN — CHLORDIAZEPOXIDE HYDROCHLORIDE 25 MG: 25 CAPSULE ORAL at 00:34

## 2022-09-13 RX ADMIN — Medication 10 ML: at 08:55

## 2022-09-13 RX ADMIN — HEPARIN SODIUM 5000 UNITS: 5000 INJECTION INTRAVENOUS; SUBCUTANEOUS at 05:46

## 2022-09-13 RX ADMIN — GABAPENTIN 800 MG: 400 CAPSULE ORAL at 20:28

## 2022-09-13 RX ADMIN — MIDAZOLAM HYDROCHLORIDE 5 MG: 1 INJECTION INTRAMUSCULAR; INTRAVENOUS at 01:13

## 2022-09-13 RX ADMIN — NICOTINE 1 PATCH: 14 PATCH, EXTENDED RELEASE TRANSDERMAL at 15:30

## 2022-09-13 RX ADMIN — LORAZEPAM 1 MG: 2 INJECTION INTRAMUSCULAR; INTRAVENOUS at 22:58

## 2022-09-13 NOTE — THERAPY EVALUATION
"Acute Care - Speech Language Pathology   Swallow Initial Evaluation [facility]   CLINICAL DYSPHAGIA ASSESSMENT     Patient Name: Lillian Ayon  : 1985  MRN: [id number]  Today's Date: 2022             Admit Date: 2022    Lillian Ayon  is seen at bedside this pm on CCU to assess safety/efficacy of swallowing fnx, determine safest/least restrictive diet tolerance. Her significant other is present at bedside.     Ms. Ayon presented to [facility] [place] ED 22 from a local custodial with reported seizure like activity. She was found to present with \"a bag of tablets in her bra later determined to be 800mg Gabapentin.\" She presented with suspected overdose. She required oral intubation upon presentation. She was admitted for further evaluation and management.     PMH is significant for cat bite, atypical migraine, Hepatitis C, anxiety, fibromyalgia, illicit drug abuse.    She is s/p extubation earlier this am (1013). She is improved in a/a status, verbally interactive, oriented, following commands, participating in conversational exchanges. She is requesting po intake, thus, she was referred to rule out dysphagia. She denies any h/o dysphagia.     Social History     Socioeconomic History   • Marital status: Single   Tobacco Use   • Smoking status: Current Every Day Smoker     Packs/day: 0.50     Years: 15.00     Pack years: 7.50     Types: Cigarettes   • Smokeless tobacco: Never Used   Vaping Use   • Vaping Use: Unknown   Substance and Sexual Activity   • Alcohol use: No   • Drug use: Yes     Types: Amphetamines, Methamphetamines, Benzodiazepines   • Sexual activity: Defer      Imaging:  XR Chest, 1 View     EXAM DATE:    2022 5:19 AM     CLINICAL HISTORY:    Respiratory failure; T50.904A-Poisoning by unspecified drugs,  medicaments and biological substances, undetermined, initial encounter;  R56.9-Unspecified convulsions     TECHNIQUE:    Frontal view of the chest.     COMPARISON:    2022   "   FINDINGS:    LUNGS:  Improved aeration of the lung bases.    PLEURAL SPACE:  Unremarkable.  No pneumothorax.    HEART:  Heart size is stable.    MEDIASTINUM:  Unremarkable.    BONES/JOINTS:  Unremarkable.    TUBES, LINES AND DEVICES:  The endotracheal tube (ETT) is in  satisfactory position.  Nasogastric tube tip in the stomach.     IMPRESSION:    Improved aeration of the lung bases.     This report was finalized on 9/13/2022 8:30 AM by Dr. Servando Liang MD.    Diet Orders (active) (From admission, onward)     Start     Ordered    09/13/22 1100  NPO Diet NPO Type: Strict NPO  Diet Effective Now         09/13/22 1059    09/10/22 1258  Peptamen Intense VHP (Vital HP); Tube Feeding Type: Continuous; Continuous Tube Feeding Start Rate (mL/hr): 30; Then Advance Rate By (mL/hr): 10; Every __ Hours: 4; To Goal Rate of (mL/hr): 55; Water Flush Amount (mL): 20; Water Flush Frequency: E...  Diet Effective Now        Comments: Rate change    09/10/22 1258              She is observed on O2 via NC 2L.     Ms. Ayon is positioned upright and centered in bed to accept multiple po presentations of ice chips, solid cracker, puree and thin liquids via spoon, cup and straw.  She does not self provide trials.     Facial/oral structures are symmetrical upon observation. Lingual protrusion reveals no deviation. Oral mucosa are moist, pink, and clean. Secretions are clear, thin, and  controlled. OROM/AVEL is generally weak to imitate oral postures. Gag is not assessed. Volitional cough is intact w/ weak  intensity, hoarse  in quality, non-productive. Voice is mildly dysphonic in quality w/ intelligible speech. She is a/a and interactive, oriented to herself and her significant other. She does follow commands and participates in simple conversations.     Upon po presentations, adequate bolus anticipation and acceptance w/ good labial seal for bolus clearance via spoon bowl, cup rim stability and suction via straw. Bolus formation,  manipulation and control are mildly weak in general with mixed phasic/ rotary mastication pattern. A-p transit is timely w/o oral residue. No overt s/s aspiration before the swallow.     Pharyngeal swallow is timely w/ adequate hyolaryngeal elevation per palpation. No overt s/s aspiration evidenced across this evaluation. No silent aspiration suspected. She is noted with sporadic, hoarse cough intermittently across this session, however, not in direct response to po presentations. Suspect likely sensory response s/p extubation process with some lingering pharyngo-laryngo edema. She denies odynophagia.    Visit Dx:     ICD-10-CM ICD-9-CM   1. Overdose of undetermined intent, initial encounter  T50.904A 977.9     E980.5   2. Seizures (HCC)  R56.9 780.39     Patient Active Problem List   Diagnosis   • Cat bite of finger   • Abnormal liver diagnostic imaging   • Atypical migraine   • HCV (hepatitis C virus)   • Overdose of undetermined intent, initial encounter     Past Medical History:   Diagnosis Date   • Anxiety    • Arthritis    • Bradycardia    • Fibromyalgia    • Heart disease    • Hepatitis C    • Myalgia      Past Surgical History:   Procedure Laterality Date   • CHOLECYSTECTOMY     • NERVE SURGERY Right      EDUCATION  The patient has been educated in the following areas:   Dysphagia (Swallowing Impairment) Oral Care/Hydration Modified Diet Instruction.     Impression: Ms. Ayon presents w/ generalized weakness, fatigue, mild vocal dysphonia with suspected lingering edema related to prolonged intubation process/extubation process earlier today. Weakness impacts oral bolus manipulation with increased oral prep time with solids. WFL pharyngeal swallow w/o s/s aspiration.No s/s indicative of silent aspiration.  No odynophagia reported.  She is noted with sporadic, hoarse cough intermittently across this session, however, not in direct response to po presentations. Suspect likely sensory response s/p extubation  process with some lingering pharyngo-laryngo edema.    She is felt to most benefit from modified po diet of mechanical soft textures, thin liquids. Medications whole in puree/thins, single pill presentations only, please. Universal aspiration precautions, TRISHA precautions, oral care protocol.     SLP Recommendation and Plan    1. Mechanical soft diet, whole foods, thin liquids.    2. Medications whole in puree/thins. Single pill presentations only, please.   3. Upright and centered for all po intake with universal aspiration precautions.   4. TRISHA precautions.  5. Oral care protocol.    D/w Ms. Ayon and her significant other results and recommendations w/ verbal agreement.    D/w Shefali CHAND,  results and recommendations w/ verbal agreement.    Thank you for allowing me to participate in the care of your patient-  LEYLA Cuello.S., [facility]/SLP                                                                                           Time Calculation:                Ludmila Gardiner, MS CCC-SLP  9/13/2022

## 2022-09-13 NOTE — PLAN OF CARE
Problem: Adult Inpatient Plan of Care  Goal: Plan of Care Review  Outcome: Ongoing, Progressing  Flowsheets (Taken 9/13/2022 0508)  Plan of Care Reviewed With: patient  Goal: Patient-Specific Goal (Individualized)  Outcome: Ongoing, Progressing  Goal: Absence of Hospital-Acquired Illness or Injury  Outcome: Ongoing, Progressing  Intervention: Identify and Manage Fall Risk  Flowsheets  Taken 9/13/2022 0508  Safety Promotion/Fall Prevention:   clutter free environment maintained   fall prevention program maintained   safety round/check completed  Taken 9/13/2022 0400  Safety Promotion/Fall Prevention:   safety round/check completed   clutter free environment maintained   fall prevention program maintained  Taken 9/13/2022 0300  Safety Promotion/Fall Prevention:   safety round/check completed   clutter free environment maintained   fall prevention program maintained  Taken 9/13/2022 0200  Safety Promotion/Fall Prevention:   safety round/check completed   clutter free environment maintained   fall prevention program maintained  Taken 9/13/2022 0100  Safety Promotion/Fall Prevention:   safety round/check completed   clutter free environment maintained   fall prevention program maintained  Taken 9/13/2022 0000  Safety Promotion/Fall Prevention:   safety round/check completed   clutter free environment maintained   fall prevention program maintained  Taken 9/12/2022 2300  Safety Promotion/Fall Prevention:   safety round/check completed   clutter free environment maintained   fall prevention program maintained  Taken 9/12/2022 2200  Safety Promotion/Fall Prevention:   safety round/check completed   clutter free environment maintained   fall prevention program maintained  Taken 9/12/2022 2100  Safety Promotion/Fall Prevention:   safety round/check completed   clutter free environment maintained   fall prevention program maintained  Taken 9/12/2022 2000  Safety Promotion/Fall Prevention:   safety round/check completed    clutter free environment maintained   fall prevention program maintained  Taken 9/12/2022 1900  Safety Promotion/Fall Prevention:   safety round/check completed   clutter free environment maintained   fall prevention program maintained  Intervention: Prevent Skin Injury  Flowsheets  Taken 9/13/2022 0508  Body Position:   turned   side-lying  Skin Protection:   adhesive use limited   tubing/devices free from skin contact  Taken 9/13/2022 0400  Body Position:   turned   right   side-lying  Taken 9/13/2022 0205  Skin Protection:   adhesive use limited   pulse oximeter probe site changed   tubing/devices free from skin contact  Taken 9/13/2022 0200  Body Position:   turned   left   side-lying  Taken 9/13/2022 0000  Body Position:   turned   right   side-lying  Taken 9/12/2022 2200  Body Position:   turned   left   side-lying  Taken 9/12/2022 2005  Skin Protection:   adhesive use limited   pulse oximeter probe site changed   tubing/devices free from skin contact  Taken 9/12/2022 2000  Body Position:   turned   right   side-lying  Intervention: Prevent and Manage VTE (Venous Thromboembolism) Risk  Flowsheets  Taken 9/13/2022 0508  Activity Management: bedrest  Range of Motion: ROM (range of motion) performed  Taken 9/13/2022 0205  Activity Management: bedrest  VTE Prevention/Management: (see MAR) other (see comments)  Range of Motion: ROM (range of motion) performed  Taken 9/12/2022 2005  Activity Management: bedrest  VTE Prevention/Management: (see MAR) other (see comments)  Range of Motion: ROM (range of motion) performed  Intervention: Prevent Infection  Flowsheets  Taken 9/13/2022 0508  Infection Prevention:   environmental surveillance performed   hand hygiene promoted   rest/sleep promoted   single patient room provided  Taken 9/13/2022 0400  Infection Prevention:   environmental surveillance performed   hand hygiene promoted   single patient room provided  Taken 9/13/2022 0200  Infection Prevention:   environmental  surveillance performed   hand hygiene promoted   rest/sleep promoted   single patient room provided  Taken 9/13/2022 0000  Infection Prevention:   environmental surveillance performed   hand hygiene promoted   rest/sleep promoted   single patient room provided  Taken 9/12/2022 2200  Infection Prevention:   environmental surveillance performed   hand hygiene promoted   rest/sleep promoted   single patient room provided  Taken 9/12/2022 2000  Infection Prevention:   environmental surveillance performed   hand hygiene promoted   rest/sleep promoted   single patient room provided  Goal: Optimal Comfort and Wellbeing  Outcome: Ongoing, Progressing  Intervention: Monitor Pain and Promote Comfort  Flowsheets  Taken 9/13/2022 0508  Pain Management Interventions: see MAR  Taken 9/13/2022 0411  Pain Management Interventions: see MAR  Taken 9/13/2022 0236  Pain Management Interventions: see MAR  Taken 9/13/2022 0114  Pain Management Interventions: see MAR  Taken 9/13/2022 0054  Pain Management Interventions: see MAR  Taken 9/12/2022 2358  Pain Management Interventions: see MAR  Taken 9/12/2022 2341  Pain Management Interventions: see MAR  Taken 9/12/2022 2005  Pain Management Interventions: see MAR  Intervention: Provide Person-Centered Care  Flowsheets  Taken 9/13/2022 0508  Trust Relationship/Rapport:   care explained   reassurance provided  Taken 9/13/2022 0205  Trust Relationship/Rapport:   care explained   reassurance provided  Taken 9/12/2022 2005  Trust Relationship/Rapport:   care explained   reassurance provided  Goal: Readiness for Transition of Care  Outcome: Ongoing, Progressing     Problem: Skin Injury Risk Increased  Goal: Skin Health and Integrity  Outcome: Ongoing, Progressing  Intervention: Optimize Skin Protection  Flowsheets  Taken 9/13/2022 0508  Pressure Reduction Techniques:   heels elevated off bed   pressure points protected   weight shift assistance provided  Head of Bed (HOB) Positioning: HOB  elevated  Pressure Reduction Devices: pressure-redistributing mattress utilized  Skin Protection:   adhesive use limited   tubing/devices free from skin contact  Taken 9/13/2022 0400  Head of Bed (HOB) Positioning:   HOB elevated   HOB at 30-45 degrees  Taken 9/13/2022 0205  Pressure Reduction Techniques:   heels elevated off bed   pressure points protected   weight shift assistance provided  Pressure Reduction Devices: pressure-redistributing mattress utilized  Skin Protection:   adhesive use limited   pulse oximeter probe site changed   tubing/devices free from skin contact  Taken 9/13/2022 0200  Head of Bed (HOB) Positioning:   HOB elevated   HOB at 30-45 degrees  Taken 9/13/2022 0000  Head of Bed (HOB) Positioning:   HOB elevated   HOB at 30-45 degrees  Taken 9/12/2022 2200  Head of Bed (HOB) Positioning:   HOB elevated   HOB at 30-45 degrees  Taken 9/12/2022 2005  Pressure Reduction Techniques:   heels elevated off bed   pressure points protected   weight shift assistance provided  Pressure Reduction Devices: pressure-redistributing mattress utilized  Skin Protection:   adhesive use limited   pulse oximeter probe site changed   tubing/devices free from skin contact  Taken 9/12/2022 2000  Head of Bed (HOB) Positioning:   HOB elevated   HOB at 30-45 degrees     Problem: Fall Injury Risk  Goal: Absence of Fall and Fall-Related Injury  Outcome: Ongoing, Progressing  Intervention: Identify and Manage Contributors  Flowsheets  Taken 9/13/2022 0508  Medication Review/Management: medications reviewed  Taken 9/13/2022 0400  Medication Review/Management: medications reviewed  Taken 9/13/2022 0200  Medication Review/Management: medications reviewed  Taken 9/13/2022 0000  Medication Review/Management: medications reviewed  Taken 9/12/2022 2200  Medication Review/Management: medications reviewed  Taken 9/12/2022 2000  Medication Review/Management: medications reviewed  Intervention: Promote Injury-Free  Environment  Flowsheets  Taken 9/13/2022 0508  Safety Promotion/Fall Prevention:   clutter free environment maintained   fall prevention program maintained   safety round/check completed  Taken 9/13/2022 0400  Safety Promotion/Fall Prevention:   safety round/check completed   clutter free environment maintained   fall prevention program maintained  Taken 9/13/2022 0300  Safety Promotion/Fall Prevention:   safety round/check completed   clutter free environment maintained   fall prevention program maintained  Taken 9/13/2022 0200  Safety Promotion/Fall Prevention:   safety round/check completed   clutter free environment maintained   fall prevention program maintained  Taken 9/13/2022 0100  Safety Promotion/Fall Prevention:   safety round/check completed   clutter free environment maintained   fall prevention program maintained  Taken 9/13/2022 0000  Safety Promotion/Fall Prevention:   safety round/check completed   clutter free environment maintained   fall prevention program maintained  Taken 9/12/2022 2300  Safety Promotion/Fall Prevention:   safety round/check completed   clutter free environment maintained   fall prevention program maintained  Taken 9/12/2022 2200  Safety Promotion/Fall Prevention:   safety round/check completed   clutter free environment maintained   fall prevention program maintained  Taken 9/12/2022 2100  Safety Promotion/Fall Prevention:   safety round/check completed   clutter free environment maintained   fall prevention program maintained  Taken 9/12/2022 2000  Safety Promotion/Fall Prevention:   safety round/check completed   clutter free environment maintained   fall prevention program maintained  Taken 9/12/2022 1900  Safety Promotion/Fall Prevention:   safety round/check completed   clutter free environment maintained   fall prevention program maintained     Problem: Adjustment to Illness (Overdose)  Goal: Optimal Coping  Outcome: Ongoing, Progressing  Intervention: Support  Psychosocial Response to Acute Illness  Flowsheets  Taken 9/13/2022 0508  Supportive Measures: relaxation techniques promoted  Family/Support System Care:   self-care encouraged   support provided  Taken 9/13/2022 0205  Supportive Measures: relaxation techniques promoted  Taken 9/12/2022 2005  Family/Support System Care:   self-care encouraged   support provided     Problem: Bleeding (Overdose)  Goal: Absence of Bleeding  Outcome: Ongoing, Progressing  Intervention: Manage Signs of Bleeding  Flowsheets  Taken 9/13/2022 0508  Bleeding Precautions: blood pressure closely monitored  Taken 9/13/2022 0205  Bleeding Precautions: blood pressure closely monitored  Taken 9/12/2022 2005  Bleeding Precautions: blood pressure closely monitored     Problem: Dysrhythmia (Overdose)  Goal: Stable Heart Rate and Rhythm  Outcome: Ongoing, Progressing     Problem: Fluid Imbalance (Overdose)  Goal: Fluid Balance  Outcome: Ongoing, Progressing  Intervention: Monitor and Manage Fluid and Electrolyte Balance  Flowsheets  Taken 9/13/2022 0508  Fluid/Electrolyte Management: fluids provided  Taken 9/13/2022 0205  Fluid/Electrolyte Management: fluids provided  Taken 9/12/2022 2005  Fluid/Electrolyte Management: fluids provided     Problem: Hemodynamic Instability (Overdose)  Goal: Effective Tissue Perfusion  Outcome: Ongoing, Progressing     Problem: Neurologic Impairment (Overdose)  Goal: Optimal Neurologic Function  Outcome: Ongoing, Progressing  Intervention: Protect and Optimize Cerebral Perfusion  Flowsheets  Taken 9/13/2022 0508  Sensory Stimulation Regulation: quiet environment promoted  Cerebral Perfusion Promotion: blood pressure monitored  Taken 9/13/2022 0205  Sensory Stimulation Regulation: quiet environment promoted  Cerebral Perfusion Promotion: blood pressure monitored  Taken 9/12/2022 2005  Sensory Stimulation Regulation: quiet environment promoted  Cerebral Perfusion Promotion: blood pressure monitored     Problem:  Respiratory Compromise (Overdose)  Goal: Effective Oxygenation and Ventilation  Outcome: Ongoing, Progressing  Intervention: Optimize Oxygenation and Ventilation  Flowsheets  Taken 9/13/2022 0508  Airway/Ventilation Management: airway patency maintained  Cough And Deep Breathing: unable to perform  Taken 9/13/2022 0205  Airway/Ventilation Management: airway patency maintained  Cough And Deep Breathing: unable to perform  Taken 9/12/2022 2005  Airway/Ventilation Management: airway patency maintained  Cough And Deep Breathing: unable to perform     Problem: Communication Impairment (Mechanical Ventilation, Invasive)  Goal: Effective Communication  Outcome: Ongoing, Progressing  Intervention: Ensure Effective Communication  Flowsheets  Taken 9/13/2022 0508  Communication Enhancement Strategies: extra time allowed for response  Taken 9/13/2022 0205  Communication Enhancement Strategies: extra time allowed for response  Taken 9/12/2022 2005  Communication Enhancement Strategies: extra time allowed for response     Problem: Device-Related Complication Risk (Mechanical Ventilation, Invasive)  Goal: Optimal Device Function  Outcome: Ongoing, Progressing  Intervention: Optimize Device Care and Function  Flowsheets  Taken 9/13/2022 0508  Airway Safety Measures: suction at bedside  Taken 9/13/2022 0400  Airway Safety Measures: suction at bedside  Taken 9/13/2022 0200  Airway Safety Measures: suction at bedside  Taken 9/13/2022 0000  Airway Safety Measures: suction at bedside  Taken 9/12/2022 2200  Airway Safety Measures: suction at bedside  Taken 9/12/2022 2000  Airway Safety Measures: suction at bedside     Problem: Inability to Wean (Mechanical Ventilation, Invasive)  Goal: Mechanical Ventilation Liberation  Outcome: Ongoing, Progressing  Intervention: Promote Extubation and Mechanical Ventilation Liberation  Flowsheets  Taken 9/13/2022 0508  Environmental Support: calm environment promoted  Sleep/Rest Enhancement:  relaxation techniques promoted  Medication Review/Management: medications reviewed  Taken 9/13/2022 0400  Medication Review/Management: medications reviewed  Taken 9/13/2022 0205  Environmental Support: calm environment promoted  Taken 9/13/2022 0200  Medication Review/Management: medications reviewed  Taken 9/13/2022 0000  Medication Review/Management: medications reviewed  Taken 9/12/2022 2200  Medication Review/Management: medications reviewed  Taken 9/12/2022 2005  Environmental Support: calm environment promoted  Sleep/Rest Enhancement: relaxation techniques promoted  Taken 9/12/2022 2000  Medication Review/Management: medications reviewed     Problem: Nutrition Impairment (Mechanical Ventilation, Invasive)  Goal: Optimal Nutrition Delivery  Outcome: Ongoing, Progressing     Problem: Skin and Tissue Injury (Mechanical Ventilation, Invasive)  Goal: Absence of Device-Related Skin and Tissue Injury  Outcome: Ongoing, Progressing  Intervention: Maintain Skin and Tissue Health  Flowsheets  Taken 9/13/2022 0508  Device Skin Pressure Protection: absorbent pad utilized/changed  Taken 9/13/2022 0205  Device Skin Pressure Protection: absorbent pad utilized/changed  Taken 9/12/2022 2005  Device Skin Pressure Protection: absorbent pad utilized/changed     Problem: Ventilator-Induced Lung Injury (Mechanical Ventilation, Invasive)  Goal: Absence of Ventilator-Induced Lung Injury  Outcome: Ongoing, Progressing  Intervention: Prevent Ventilator-Associated Pneumonia  Flowsheets  Taken 9/13/2022 0508  Head of Bed (HOB) Positioning: HOB elevated  VAP Prevention Bundle: HOB elevation maintained  Oral Care:   lip/mouth moisturizer applied   swabbed with antiseptic solution  Taken 9/13/2022 0400  Head of Bed (HOB) Positioning:   HOB elevated   HOB at 30-45 degrees  Oral Care:   lip/mouth moisturizer applied   swabbed with antiseptic solution  Taken 9/13/2022 0205  VAP Prevention Bundle: HOB elevation maintained  Taken 9/13/2022  0200  Head of Bed (HOB) Positioning:   HOB elevated   HOB at 30-45 degrees  Taken 9/13/2022 0000  Head of Bed (HOB) Positioning:   HOB elevated   HOB at 30-45 degrees  Oral Care:   lip/mouth moisturizer applied   swabbed with antiseptic solution  Taken 9/12/2022 2200  Head of Bed (HOB) Positioning:   HOB elevated   HOB at 30-45 degrees  Taken 9/12/2022 2005  VAP Prevention Bundle: HOB elevation maintained  Taken 9/12/2022 2000  Head of Bed (HOB) Positioning:   HOB elevated   HOB at 30-45 degrees  Oral Care:   lip/mouth moisturizer applied   swabbed with antiseptic solution     Problem: Restraint, Nonbehavioral (Nonviolent)  Goal: Absence of Harm or Injury  Outcome: Ongoing, Progressing  Intervention: Implement Least Restrictive Safety Strategies  Flowsheets  Taken 9/13/2022 0508  Medical Device Protection: tubing secured  Less Restrictive Alternative:   bed alarm in use   calming techniques promoted   coping techniques promoted  De-Escalation Techniques:   increased round frequency   medication administered  Diversional Activities: television  Taken 9/13/2022 0400  Medical Device Protection: tubing secured  Less Restrictive Alternative:   bed alarm in use   calming techniques promoted   coping techniques promoted  De-Escalation Techniques:   increased round frequency   medication administered  Diversional Activities: television  Taken 9/13/2022 0205  Diversional Activities: television  Taken 9/13/2022 0200  Medical Device Protection: tubing secured  Less Restrictive Alternative:   bed alarm in use   calming techniques promoted   coping techniques promoted  De-Escalation Techniques:   increased round frequency   medication administered  Diversional Activities: television  Taken 9/13/2022 0000  Medical Device Protection: tubing secured  Less Restrictive Alternative:   bed alarm in use   calming techniques promoted   coping techniques promoted  De-Escalation Techniques:   increased round frequency   medication  administered  Diversional Activities: television  Taken 9/12/2022 2200  Medical Device Protection: tubing secured  Less Restrictive Alternative:   bed alarm in use   calming techniques promoted   coping techniques promoted  De-Escalation Techniques:   increased round frequency   medication administered  Diversional Activities: television  Taken 9/12/2022 2005  Diversional Activities: television  Taken 9/12/2022 2000  Medical Device Protection: tubing secured  Less Restrictive Alternative:   bed alarm in use   coping techniques promoted   calming techniques promoted  De-Escalation Techniques:   increased round frequency   medication administered  Diversional Activities: television  Intervention: Protect Dignity, Rights, and Personal Wellbeing  Flowsheets  Taken 9/13/2022 0508  Trust Relationship/Rapport:   care explained   reassurance provided  Taken 9/13/2022 0205  Trust Relationship/Rapport:   care explained   reassurance provided  Taken 9/12/2022 2005  Trust Relationship/Rapport:   care explained   reassurance provided  Intervention: Protect Skin and Joint Integrity  Flowsheets  Taken 9/13/2022 0508  Body Position:   turned   side-lying  Range of Motion: ROM (range of motion) performed  Taken 9/13/2022 0400  Body Position:   turned   right   side-lying  Taken 9/13/2022 0205  Range of Motion: ROM (range of motion) performed  Taken 9/13/2022 0200  Body Position:   turned   left   side-lying  Taken 9/13/2022 0000  Body Position:   turned   right   side-lying  Taken 9/12/2022 2200  Body Position:   turned   left   side-lying  Taken 9/12/2022 2005  Range of Motion: ROM (range of motion) performed  Taken 9/12/2022 2000  Body Position:   turned   right   side-lying  Goal: Absence of Harm or Injury  Outcome: Ongoing, Progressing  Intervention: Implement Least Restrictive Safety Strategies  Flowsheets  Taken 9/13/2022 0508  Medical Device Protection: tubing secured  Less Restrictive Alternative:   bed alarm in use    calming techniques promoted   coping techniques promoted  De-Escalation Techniques:   increased round frequency   medication administered  Diversional Activities: television  Taken 9/13/2022 0400  Medical Device Protection: tubing secured  Less Restrictive Alternative:   bed alarm in use   calming techniques promoted   coping techniques promoted  De-Escalation Techniques:   increased round frequency   medication administered  Diversional Activities: television  Taken 9/13/2022 0205  Diversional Activities: television  Taken 9/13/2022 0200  Medical Device Protection: tubing secured  Less Restrictive Alternative:   bed alarm in use   calming techniques promoted   coping techniques promoted  De-Escalation Techniques:   increased round frequency   medication administered  Diversional Activities: television  Taken 9/13/2022 0000  Medical Device Protection: tubing secured  Less Restrictive Alternative:   bed alarm in use   calming techniques promoted   coping techniques promoted  De-Escalation Techniques:   increased round frequency   medication administered  Diversional Activities: television  Taken 9/12/2022 2200  Medical Device Protection: tubing secured  Less Restrictive Alternative:   bed alarm in use   calming techniques promoted   coping techniques promoted  De-Escalation Techniques:   increased round frequency   medication administered  Diversional Activities: television  Taken 9/12/2022 2005  Diversional Activities: television  Taken 9/12/2022 2000  Medical Device Protection: tubing secured  Less Restrictive Alternative:   bed alarm in use   coping techniques promoted   calming techniques promoted  De-Escalation Techniques:   increased round frequency   medication administered  Diversional Activities: television  Intervention: Protect Dignity, Rights, and Personal Wellbeing  Recent Flowsheet Documentation  Taken 9/13/2022 0508 by Basia Mtz RN  Trust Relationship/Rapport:   care explained   reassurance  provided  Taken 9/13/2022 0205 by Basia Mtz, RN  Trust Relationship/Rapport:   care explained   reassurance provided  Taken 9/12/2022 2005 by Basia Mtz RN  Trust Relationship/Rapport:   care explained   reassurance provided  Intervention: Protect Skin and Joint Integrity  Flowsheets  Taken 9/13/2022 0508  Body Position:   turned   side-lying  Range of Motion: ROM (range of motion) performed  Taken 9/13/2022 0400  Body Position:   turned   right   side-lying  Taken 9/13/2022 0205  Range of Motion: ROM (range of motion) performed  Taken 9/13/2022 0200  Body Position:   turned   left   side-lying  Taken 9/13/2022 0000  Body Position:   turned   right   side-lying  Taken 9/12/2022 2200  Body Position:   turned   left   side-lying  Taken 9/12/2022 2005  Range of Motion: ROM (range of motion) performed  Taken 9/12/2022 2000  Body Position:   turned   right   side-lying   Goal Outcome Evaluation:  Plan of Care Reviewed With: patient        Progress: no change

## 2022-09-13 NOTE — PLAN OF CARE
Problem: Restraint, Nonbehavioral (Nonviolent)  Goal: Absence of Harm or Injury  Outcome: Met  Intervention: Implement Least Restrictive Safety Strategies  Recent Flowsheet Documentation  Taken 9/13/2022 1000 by Sudha, Shefali Vandana, RN  Less Restrictive Alternative:   bed alarm in use   calming techniques promoted   coping techniques promoted   emotional support provided   family presence promoted   safety enhancements provided   surveillance provided   positive reinforcement provided  De-Escalation Techniques:   increased round frequency   family involvement requested   reoriented   stimulation decreased   verbally redirected  Diversional Activities: television  Taken 9/13/2022 0800 by Shefali Haley RN  De-Escalation Techniques:   stimulation decreased   medication administered   increased round frequency   reoriented   verbally redirected     Problem: Restraint, Nonbehavioral (Nonviolent)  Goal: Absence of Harm or Injury  Outcome: Met  Intervention: Implement Least Restrictive Safety Strategies  Recent Flowsheet Documentation  Taken 9/13/2022 1000 by Sudha, Shefali Vandana, RN  Less Restrictive Alternative:   bed alarm in use   calming techniques promoted   coping techniques promoted   emotional support provided   family presence promoted   safety enhancements provided   surveillance provided   positive reinforcement provided  De-Escalation Techniques:   increased round frequency   family involvement requested   reoriented   stimulation decreased   verbally redirected  Diversional Activities: television  Taken 9/13/2022 0800 by Shefali Haley RN  De-Escalation Techniques:   stimulation decreased   medication administered   increased round frequency   reoriented   verbally redirected   Goal Outcome Evaluation:

## 2022-09-13 NOTE — PROGRESS NOTES
PROGRESS NOTE  Patient Name: Lillian Ayon  Age/Sex: 36 y.o. female  : 1985  MRN: [id number]    Date of Admission: 2022  Date of Encounter Visit: 22   LOS: 5 days   Patient Care Team:  Jason Mathew MD as PCP - General (Family Medicine)    Chief Complaint: Patient was admitted with respiratory failure due to drug overdose and was intubated for airway protection    Hospital course: Patient was started on the Librium and was kept on the propofol and the fentanyl overnight.  She is off the Levophed, she is afebrile, she has good urine output, her labs were reviewed, CPK continues to improve with normal kidney function and normal white blood cell count.  Her chest x-ray is better, she is awake and responsive, she is able to follow complex commands.  She has several bowel movement.  She did have some vomiting mainly from gagging on the ET tube    REVIEW OF SYSTEMS:   CONSTITUTIONAL: no fever or chills  Restless, had 1 episode of vomiting suspected to be caused by gagging on the ET tube  Ventilator/Non-Invasive Ventilation Settings (From admission, onward)             Start     Ordered    22 1001  Ventilator - AC/VC; Other; 25; 40%; SpO2 >/= 90%; 5; mL; 400  Continuous        Question Answer Comment   Vent Mode AC/VC    Rate Other    Rate 25    FiO2 40%    Titrate FiO2 to Keep SpO2 >/= 90%    PEEP 5    Tidal Volume mL            22 1001    22 2333  Ventilator - AC/VC; Other; 25; 100%; SpO2 >/= 90%; 5; mL; 400  Continuous,   Status:  Canceled        Question Answer Comment   Vent Mode AC/VC    Rate Other    Rate 25    FiO2 100%    Titrate FiO2 to Keep SpO2 >/= 90%    PEEP 5    Tidal Volume mL            22 2333                  Vital Signs  Temp:  [98.9 °F (37.2 °C)-99.6 °F (37.6 °C)] 98.9 °F (37.2 °C)  Heart Rate:  [] 84  Resp:  [25-35] 35  BP: ()/(56-98) 128/84  FiO2 (%):  [30 %-35 %] 30 %  SpO2:  [90 %-100 %] 99 %  on    Device (Oxygen  "Therapy): ventilator    Intake/Output Summary (Last 24 hours) at 9/13/2022 0906  Last data filed at 9/13/2022 0555  Gross per 24 hour   Intake 4065.71 ml   Output 5530 ml   Net -1464.29 ml     Flowsheet Rows    Flowsheet Row First Filed Value   Admission Height 167.6 cm (66\") Documented at 09/07/2022 2116   Admission Weight 99.8 kg (220 lb) Documented at 09/07/2022 2116        Body mass index is 39.01 kg/m².      09/10/22  0441 09/11/22  0500 09/12/22  0500   Weight: 109 kg (240 lb 8.4 oz) 110 kg (242 lb 8.1 oz) 110 kg (241 lb 9.6 oz)       Physical Exam:  GEN: Arousable, orally intubated, on the ventilator, able to follow complex commands  EYES:   Sclerae clear. No icterus. PERRL.  Tracks with eyes properly  ENT:   External ears/nose normal, no oral lesions, no thrush, mucous membranes moist, oral ET tube  NECK:  Supple, midline trachea, no JVD  LUNGS: Normal chest on inspection, CTAB, no wheezes. No rhonchi. No crackles. Respirations regular, even and unlabored.   CV:  Regular rhythm and rate. Normal S1/S2. No murmurs, gallops, or rubs noted.  ABD:  Soft, nontender and nondistended. Normal bowel sounds. No guarding  EXT:  Moves all extremities well. No cyanosis. No redness.  Trace edema.  She has evidence of needle tracks from IV drug abuse  Skin: Dry, intact, no bleeding    Results Review:    Results From Last 14 Days   Lab Units 09/13/22  0129 09/08/22  0003   LACTATE mmol/L  --  1.8   TRIGLYCERIDES mg/dL 165*  --      Results from last 7 days   Lab Units 09/13/22  0129 09/12/22  0103 09/11/22  0040 09/10/22  1409 09/10/22  0111 09/09/22  0042 09/08/22  0003   SODIUM mmol/L 139 137 136  --  139 136 139   POTASSIUM mmol/L 3.7 3.4* 3.5 4.0 3.6 3.8 3.3*   CHLORIDE mmol/L 104 103 104  --  109* 108* 104   CO2 mmol/L 22.4 24.4 22.5  --  18.8* 19.6* 22.1   BUN mg/dL 4* 5* 4*  --  5* 6 8   CREATININE mg/dL 0.37* 0.38* 0.38*  --  0.46* 0.59 0.71   CALCIUM mg/dL 8.8 8.4* 8.2*  --  7.6* 7.8* 8.9   AST (SGOT) U/L  --  <5 " 53*  --  81* 10 47*   ALT (SGPT) U/L  --  <5 <5  --  37* <5 26   ANION GAP mmol/L 12.6 9.6 9.5  --  11.2 8.4 12.9   ALBUMIN g/dL 3.07* 2.53* 2.54*  --  2.61* 2.50* 4.31     Results from last 7 days   Lab Units 09/13/22  0129 09/11/22  0040 09/10/22  0111 09/08/22  1005 09/08/22  0003   CK TOTAL U/L 1,172* 2,278* 4,694*   < > 1,775*   TROPONIN T ng/mL  --   --   --   --  <0.010    < > = values in this interval not displayed.     Results from last 7 days   Lab Units 09/08/22  0003   TSH uIU/mL 0.492         Results from last 7 days   Lab Units 09/13/22  0129 09/12/22  0103 09/11/22  0040 09/10/22  0111 09/09/22  0042 09/08/22  0003   WBC 10*3/mm3 6.78 6.92 6.26 6.20 6.48 22.57*   HEMOGLOBIN g/dL 9.3* 9.0* 8.8* 9.0* 8.9* 11.5*   HEMATOCRIT % 29.0* 26.6* 26.3* 28.4* 26.7* 35.8   PLATELETS 10*3/mm3 224 196 171 165 166 266   MCV fL 87.6 87.5 88.3 91.3 91.1 86.5   NEUTROPHIL % %  --  59.4 61.2 62.9 74.3 92.7*   LYMPHOCYTE % %  --  28.3 27.6 26.0 18.8* 2.4*   MONOCYTES % %  --  7.7 6.9 7.7 6.2 4.3*   EOSINOPHIL % %  --  4.0 3.7 2.6 0.2* 0.0*   BASOPHIL % %  --  0.3 0.3 0.5 0.2 0.2   IMM GRAN % %  --  0.3 0.3 0.3 0.3 0.4     Results from last 7 days   Lab Units 09/08/22  0003   INR  1.02   APTT seconds 25.1*     Results from last 7 days   Lab Units 09/08/22  1005 09/08/22  0003   MAGNESIUM mg/dL 2.2 2.2     Results from last 7 days   Lab Units 09/13/22  0129   TRIGLYCERIDES mg/dL 165*     Results from last 7 days   Lab Units 09/10/22  0503 09/09/22  0923 09/08/22  0918 09/08/22  0204 09/08/22  0202 09/07/22  2327   PH, ARTERIAL pH units 7.376 7.392 7.414 7.329* 7.301* 7.345*   PCO2, ARTERIAL mm Hg 35.1 35.2 35.2 42.8 47.1* 38.2   PO2 ART mm Hg 101.0 94.7 84.3 172.0* 66.1* 99.0   HCO3 ART mmol/L 20.6 21.3 22.5 22.5 23.2 20.9         No results found for: POCGLU  Results from last 7 days   Lab Units 09/08/22  0003   PROCALCITONIN ng/mL 0.06   LACTATE mmol/L 1.8     Results from last 7 days   Lab Units 09/10/22  1409  09/08/22  0205 09/08/22  0129   BLOODCX  No growth at 2 days  No growth at 2 days Corynebacterium species* No growth at 5 days   BCIDPCR   --  Negative by BCID PCR. Culture to Follow.  --      Results from last 7 days   Lab Units 09/08/22  0112   NITRITE UA  Negative   WBC UA /HPF 0-2   BACTERIA UA /HPF None Seen   SQUAM EPITHEL UA /HPF None Seen     Results from last 7 days   Lab Units 09/08/22  0212   COVID19  Not Detected   INFLUENZA B PCR  Not Detected               Imaging:   Imaging Results (All)           I reviewed the patient's new clinical results.  I personally viewed and interpreted the patient's imaging results: Chest x-ray probably was an expiratory film.  The reduced lung volume despite the adequate ABG results.  Patient had some bibasilar infiltrate on the CAT scan more so on the left side consistent with pneumonia/atelectasis        Medication Review:   amoxicillin-clavulanate, 500 mg, Per G Tube, Q8H  chlordiazePOXIDE, 25 mg, Per G Tube, Q6H  chlorhexidine, 15 mL, Mouth/Throat, Q12H  famotidine, 20 mg, Intravenous, Q12H  ferrous sulfate, 325 mg, Oral, Daily With Breakfast  gabapentin, 800 mg, Oral, TID  heparin (porcine), 5,000 Units, Subcutaneous, Q8H  Midazolam HCl (PF), , ,   Midazolam HCl (PF), , ,   sodium chloride, 10 mL, Intravenous, Q12H  sodium chloride, 10 mL, Intravenous, Q12H  sodium chloride, 10 mL, Intravenous, Q12H  sodium chloride, 10 mL, Intravenous, Q12H        fentaNYL Citrate,   norepinephrine, 0.02-0.3 mcg/kg/min, Last Rate: Stopped (09/08/22 1401)  propofol, 5-60 mcg/kg/min, Last Rate: 60 mcg/kg/min (09/13/22 0657)  sodium chloride, 75 mL/hr, Last Rate: 75 mL/hr (09/12/22 0891)        ASSESSMENT:   1. Acute respiratory failure, inability to protect airway related to overdose  2. Overdose of unknown substance, notable for possession of gabapentin tablets on ED admission  3. Urine toxicology positive for multiple substances: Methamphetamines, amphetamines, benzodiazepines,  methadone  4. Bilateral lower lobe pneumonia, likely aspiration  5. Hepatitis C  6. Mild normocytic anemia  7. History of anxiety, fibromyalgia  8. History of drug abuse  9. Mild rhabdomyolysis on presentation, CPK trending down    PLAN:  Summary of recommendations:  · Continue with the Librium  · Extubated and consider oral medication based on her postextubation swallow assessment, if unable will consider putting a nasogastric tube  · Okay to remove Ibarra catheter  · May be transferred out of the ICU if remains stable postextubation  · Wean and discontinue the fentanyl drip  · Will decide on weaning the Librium depending on her follow-up      Discussed with ICU rounding team, with the nursing staff at the bedside, there is no family    Disposition:     Bolivar Cervantes MD  09/13/22  09:06 EDT      Dictated utilizing Dragon dictation

## 2022-09-13 NOTE — PROGRESS NOTES
[facility] HOSPITALIST PROGRESS NOTE     Patient Identification:  Name:  Lillian Ayon  Age:  36 y.o.  Sex:  female  :  1985  MRN:  [id number]  Visit Number:  [id number]  ROOM: [address]     Primary Care Provider:  Jason Mathew MD     Date of Admission: 2022    Length of stay in inpatient status:  5    Subjective     Chief Compliant:    Chief Complaint   Patient presents with   • Drug Overdose       Sedation weaned this morning in attempt to perform controlled extubation. Unfortunately shortly after propofol was tuned off she managed to self extubate and since has remained on 2L NC without issue. Discussed update at bedside with mother present. Patient with poor memory of events prior to admission but notes she was given gabapentin by fellow inmate in longterm and this was a new med for her, suspects she overdosed on this but denies SI or intentional OD.    ROS limited given intubation/sedation      Objective     Current Hospital Meds:  amoxicillin-clavulanate, 500 mg, Per G Tube, Q8H  chlordiazePOXIDE, 25 mg, Per G Tube, Q6H  chlorhexidine, 15 mL, Mouth/Throat, Q12H  famotidine, 20 mg, Intravenous, Q12H  ferrous sulfate, 325 mg, Oral, Daily With Breakfast  gabapentin, 800 mg, Oral, TID  heparin (porcine), 5,000 Units, Subcutaneous, Q8H  nicotine, 1 patch, Transdermal, Q24H  sodium chloride, 10 mL, Intravenous, Q12H  sodium chloride, 10 mL, Intravenous, Q12H  sodium chloride, 10 mL, Intravenous, Q12H  sodium chloride, 10 mL, Intravenous, Q12H    fentaNYL Citrate, , Last Rate: Stopped (22 1502)  norepinephrine, 0.02-0.3 mcg/kg/min, Last Rate: Stopped (22 1401)  propofol, 5-60 mcg/kg/min, Last Rate: Stopped (22 0934)  sodium chloride, 75 mL/hr, Last Rate: 75 mL/hr (22 1212)      Current Antimicrobial Therapy:  Anti-Infectives (From admission, onward)    Ordered     Dose/Rate Route Frequency Start Stop    22  amoxicillin-clavulanate (AUGMENTIN)  200-28.5 MG/5ML suspension 500 mg        Ordering Provider: Bolivar Cervantes MD    500 mg Per G Tube Every 8 Hours 09/12/22 1100 09/14/22 0259    09/08/22 1216  piperacillin-tazobactam (ZOSYN) IVPB 3.375 g in 100 mL NS VTB        Ordering Provider: Freddie Nava MD    3.375 g  over 30 Minutes Intravenous Once 09/08/22 1315 09/08/22 1521    09/08/22 0824  cefepime (MAXIPIME) 2 g/100 mL 0.9% NS (mbp)        Ordering Provider: Freddie Nava MD    2 g  200 mL/hr over 30 Minutes Intravenous Once 09/08/22 0900 09/08/22 0947    09/08/22 0626  metroNIDAZOLE (FLAGYL) IVPB 500 mg        Ordering Provider: Jareth Mendez MD    500 mg  over 30 Minutes Intravenous Once 09/08/22 0628 09/08/22 0710    09/07/22 2233  cefepime (MAXIPIME) 2 g/100 mL 0.9% NS (mbp)        Ordering Provider: Damaris Escalante DO    2 g  200 mL/hr over 30 Minutes Intravenous Once 09/07/22 2235 09/08/22 0246        Current Diuretic Therapy:  Diuretics (From admission, onward)    None        ----------------------------------------------------------------------------------------------------------------------  Vital Signs:  Temp:  [98.4 °F (36.9 °C)-99.3 °F (37.4 °C)] 98.4 °F (36.9 °C)  Heart Rate:  [] 93  Resp:  [17-35] 18  BP: ()/() 145/99  FiO2 (%):  [30 %-35 %] 30 %  SpO2:  [90 %-100 %] 98 %  on  Flow (L/min):  [2] 2;   Device (Oxygen Therapy): nasal cannula  Body mass index is 39.01 kg/m².    Wt Readings from Last 3 Encounters:   09/12/22 110 kg (241 lb 9.6 oz)   06/26/22 99.8 kg (220 lb)   04/28/22 90.7 kg (200 lb)     Intake & Output (last 3 days)       09/10 0701 09/11 0700 09/11 0701 09/12 0700 09/12 0701 09/13 0700 09/13 0701 09/14 0700    I.V. (mL/kg) 3347.3 (30.4) 3630.4 (33) 4045.7 (36.8) 1044.7 (9.5)    Other 428 463 20     NG/GT 1153 1126      IV Piggyback 100 100      Total Intake(mL/kg) 5028.3 (45.7) 5319.4 (48.4) 4065.7 (37) 1044.7 (9.5)    Urine (mL/kg/hr) 4225 (1.6) 5730 (2.2) 5530 (2.1) 1900 (1.8)    Stool 0  0 0 0    Total Output 4225 5784 5566 1900    Net +803.3 -410.6 -1464.3 -855.3            Stool Unmeasured Occurrence    1 x        Diet Soft Texture; [employer]; Thin  ----------------------------------------------------------------------------------------------------------------------  Physical Exam  Vitals and nursing note reviewed.   Constitutional:       Appearance: She is obese.      Comments: Awake and alert, speaking softly  HENT:      Head: Normocephalic and atraumatic.      Mouth/Throat:      Pharynx: Oropharynx is clear.      Comments: Coughing up opaque sputum  Eyes:      General: No scleral icterus.     Conjunctiva/sclera: Conjunctivae normal.   Cardiovascular:      Rate and Rhythm: Normal rate and regular rhythm.      Pulses: Normal pulses.      Heart sounds: No murmur heard.  Pulmonary:      Breath sounds: No wheezing.      Comments: 2L NC without wheezes, curse crackles  Abdominal:      Palpations: Abdomen is soft.      Tenderness: There is no guarding.   Musculoskeletal:      Right lower leg: No edema.      Left lower leg: No edema.   Skin:     General: Skin is warm and dry.   Neurological:      Mental Status: She is alert, occasionally confused but oriented     Comments: Moves all 4 extremities spontaneously    Psychiatric:      Comments: Mood labile, occasionally crying when discussing hospitalization with mother but otherwise positive during interactions. No combative behaviors.      ----------------------------------------------------------------------------------------------------------------------    ----------------------------------------------------------------------------------------------------------------------  LABS:    CBC and coagulation:  Results from last 7 days   Lab Units 09/13/22  0129 09/12/22  0103 09/11/22  0040 09/10/22  0111 09/09/22  0042 09/08/22  0003   PROCALCITONIN ng/mL  --   --   --   --   --  0.06   LACTATE mmol/L  --   --   --   --   --  1.8   WBC 10*3/mm3 6.78 6.92  6.26 6.20 6.48 22.57*   HEMOGLOBIN g/dL 9.3* 9.0* 8.8* 9.0* 8.9* 11.5*   HEMATOCRIT % 29.0* 26.6* 26.3* 28.4* 26.7* 35.8   MCV fL 87.6 87.5 88.3 91.3 91.1 86.5   MCHC g/dL 32.1 33.8 33.5 31.7 33.3 32.1   PLATELETS 10*3/mm3 224 196 171 165 166 266   INR   --   --   --   --   --  1.02     Acid/base balance:  Results from last 7 days   Lab Units 09/10/22  0503 09/09/22  0923 09/08/22  0918 09/08/22  0204 09/08/22  0202 09/07/22  2327   PH, ARTERIAL pH units 7.376 7.392 7.414 7.329* 7.301* 7.345*   PO2 ART mm Hg 101.0 94.7 84.3 172.0* 66.1* 99.0   PCO2, ARTERIAL mm Hg 35.1 35.2 35.2 42.8 47.1* 38.2   HCO3 ART mmol/L 20.6 21.3 22.5 22.5 23.2 20.9     Renal and electrolytes:  Results from last 7 days   Lab Units 09/13/22  0129 09/12/22  0103 09/11/22  0040 09/10/22  1409 09/10/22  0111 09/09/22  0042 09/08/22  1005 09/08/22  0003   SODIUM mmol/L 139 137 136  --  139 136  --  139   POTASSIUM mmol/L 3.7 3.4* 3.5 4.0 3.6 3.8  --  3.3*   MAGNESIUM mg/dL  --   --   --   --   --   --  2.2 2.2   CHLORIDE mmol/L 104 103 104  --  109* 108*  --  104   CO2 mmol/L 22.4 24.4 22.5  --  18.8* 19.6*  --  22.1   BUN mg/dL 4* 5* 4*  --  5* 6  --  8   CREATININE mg/dL 0.37* 0.38* 0.38*  --  0.46* 0.59  --  0.71   CALCIUM mg/dL 8.8 8.4* 8.2*  --  7.6* 7.8*  --  8.9   PHOSPHORUS mg/dL 3.7  --   --   --   --   --   --  2.6   GLUCOSE mg/dL 84 100* 119*  --  71 84  --  173*     Estimated Creatinine Clearance: 269.2 mL/min (A) (by C-G formula based on SCr of 0.37 mg/dL (L)).    Liver and pancreatic function:  Results from last 7 days   Lab Units 09/13/22  0129 09/12/22  0103 09/11/22  0040 09/10/22  0111 09/09/22  0042 09/08/22  0003   ALBUMIN g/dL 3.07* 2.53* 2.54* 2.61* 2.50* 4.31   BILIRUBIN mg/dL  --  0.2 0.2 0.2 0.2 0.4   ALK PHOS U/L  --  60 59 63 61 91   AST (SGOT) U/L  --  <5 53* 81* 10 47*   ALT (SGPT) U/L  --  <5 <5 37* <5 26   AMMONIA umol/L  --   --   --   --   --  25     Endocrine function:  No results found for: HGBA1C  Point of  care bedside glucose levels:  Results from last 7 days   Lab Units 09/10/22  0802 09/10/22  0654 09/08/22  0229 09/07/22  2258   GLUCOSE mg/dL 93 64* 143* 224*     Glucose levels from the [facility]:  Results from last 7 days   Lab Units 09/13/22  0129 09/12/22  0103 09/11/22  0040 09/10/22  0111 09/09/22  0042 09/08/22  0003   GLUCOSE mg/dL 84 100* 119* 71 84 173*     Lab Results   Component Value Date    TSH 0.492 09/08/2022     Cardiac:  Results from last 7 days   Lab Units 09/13/22  0129 09/11/22  0040 09/10/22  0111 09/09/22  0042 09/08/22  1005 09/08/22  0003   CK TOTAL U/L 1,172* 2,278* 4,694* 7,811* 8,122* 1,775*   TROPONIN T ng/mL  --   --   --   --   --  <0.010     Results from last 7 days   Lab Units 09/13/22  0129   TRIGLYCERIDES mg/dL 165*     Cultures:  Lab Results   Component Value Date    COLORU Yellow 09/08/2022    CLARITYU Cloudy (A) 09/08/2022    PHUR 5.5 09/08/2022    GLUCOSEU 500 mg/dL (2+) (A) 09/08/2022    KETONESU 40 mg/dL (2+) (A) 09/08/2022    BLOODU Large (3+) (A) 09/08/2022    NITRITEU Negative 09/08/2022    LEUKOCYTESUR Negative 09/08/2022    BILIRUBINUR Negative 09/08/2022    UROBILINOGEN 0.2 E.U./dL 09/08/2022    RBCUA 0-2 09/08/2022    WBCUA 0-2 09/08/2022    BACTERIA None Seen 09/08/2022     Microbiology Results (last 10 days)     Procedure Component Value - Date/Time    Respiratory Culture - Lavage, Alveoli [[id number]]  (Abnormal) Collected: 09/12/22 1531    Lab Status: Preliminary result Specimen: Lavage from Alveoli Updated: 09/13/22 1148     Respiratory Culture Light growth (2+) Staphylococcus aureus, MRSA     Comment: Methicillin resistant Staphylococcus aureus, Patient may be an isolation risk.         No Normal Respiratory Hilary     Gram Stain Moderate (3+) WBCs seen      Rare (1+) Epithelial cells seen      No organisms seen    Blood Culture - Blood, Arm, Right [[id number]]  (Normal) Collected: 09/10/22 1409    Lab Status: Preliminary result Specimen: Blood from Arm, Right Updated:  09/13/22 1547     Blood Culture No growth at 3 days    Blood Culture - Blood, Arm, Left [[id number]]  (Normal) Collected: 09/10/22 1409    Lab Status: Preliminary result Specimen: Blood from Arm, Left Updated: 09/13/22 1547     Blood Culture No growth at 3 days    MRSA Screen, PCR (Inpatient) - Swab, Nares [[id number]]  (Abnormal) Collected: 09/08/22 1027    Lab Status: Final result Specimen: Swab from Nares Updated: 09/08/22 1314     MRSA PCR MRSA Detected    Narrative:      The negative predictive value of this diagnostic test is high and should only be used to consider de-escalating anti-MRSA therapy. A positive result may indicate colonization with MRSA and must be correlated clinically.    COVID PRE-OP / PRE-PROCEDURE SCREENING ORDER (NO ISOLATION) - Swab, Nasopharynx [[id number]]  (Normal) Collected: 09/08/22 0212    Lab Status: Final result Specimen: Swab from Nasopharynx Updated: 09/08/22 0234    Narrative:      The following orders were created for panel order COVID PRE-OP / PRE-PROCEDURE SCREENING ORDER (NO ISOLATION) - Swab, Nasopharynx.  Procedure                               Abnormality         Status                     ---------                               -----------         ------                     COVID-19 and FLU A/B PCR...[[id number]]  Normal              Final result                 Please view results for these tests on the individual orders.    COVID-19 and FLU A/B PCR - Swab, Nasopharynx [[id number]]  (Normal) Collected: 09/08/22 0212    Lab Status: Final result Specimen: Swab from Nasopharynx Updated: 09/08/22 0234     COVID19 Not Detected     Influenza A PCR Not Detected     Influenza B PCR Not Detected    Narrative:      Fact sheet for providers: https://www.fda.gov/media/654818/download    Fact sheet for patients: https://www.fda.gov/media/445168/download    Test performed by PCR.    Blood Culture - Blood, Blood, Venous Line [[id number]]  (Abnormal) Collected: 09/08/22 0205    Lab Status:  Final result Specimen: Blood, Venous Line Updated: 09/10/22 0619     Blood Culture Corynebacterium species     Isolated from Anaerobic Bottle     Gram Stain Anaerobic Bottle Gram positive bacilli    Narrative:      Probable contaminant requires clinical correlation, susceptibility not performed unless requested by physician.    Blood culture does not meet the specified criteria for PCR identification.  If pregnant, immunocompromised, or clinical concern for meningitis, call lab to run BCID for Listeria monocytogenes.    Blood Culture ID, PCR - Blood, Blood, Venous Line [[id number]]  (Normal) Collected: 09/08/22 0205    Lab Status: Final result Specimen: Blood, Venous Line Updated: 09/09/22 1801     BCID, PCR Negative by BCID PCR. Culture to Follow.    Blood Culture - Blood, Blood, Central Line [[id number]]  (Normal) Collected: 09/08/22 0129    Lab Status: Final result Specimen: Blood, Central Line Updated: 09/13/22 0147     Blood Culture No growth at 5 days          Lab Results   Component Value Date    PREGTESTUR Negative 09/08/2022     Pain Management Panel     Pain Management Panel Latest Ref Rng & Units 9/8/2022 5/25/2021    AMPHETAMINES SCREEN, URINE Negative Positive(A) Positive(A)    BARBITURATES SCREEN Negative Negative Negative    BENZODIAZEPINE SCREEN, URINE Negative Positive(A) Negative    BUPRENORPHINEUR Negative Negative Negative    COCAINE SCREEN, URINE Negative Negative Negative    METHADONE SCREEN, URINE Negative Positive(A) Negative    METHAMPHETAMINEUR Negative Positive(A) -          I have personally looked at the labs and they are summarized above.  ----------------------------------------------------------------------------------------------------------------------  Detailed radiology reports for the last 24 hours:    Imaging Results (Last 24 Hours)     Procedure Component Value Units Date/Time    XR Chest 1 View [[id number]] Collected: 09/13/22 0830     Updated: 09/13/22 0833    Narrative:       EXAM:    XR Chest, 1 View     EXAM DATE:    9/13/2022 5:19 AM     CLINICAL HISTORY:    Respiratory failure; T50.904A-Poisoning by unspecified drugs,  medicaments and biological substances, undetermined, initial encounter;  R56.9-Unspecified convulsions     TECHNIQUE:    Frontal view of the chest.     COMPARISON:    09/12/2022     FINDINGS:    LUNGS:  Improved aeration of the lung bases.    PLEURAL SPACE:  Unremarkable.  No pneumothorax.    HEART:  Heart size is stable.    MEDIASTINUM:  Unremarkable.    BONES/JOINTS:  Unremarkable.    TUBES, LINES AND DEVICES:  The endotracheal tube (ETT) is in  satisfactory position.  Nasogastric tube tip in the stomach.       Impression:        Improved aeration of the lung bases.     This report was finalized on 9/13/2022 8:30 AM by Dr. Servando Liang MD.           I have personally looked at the radiology images over the last 24hrs, my personal read below:    CXR this am with improved lung aeration and no new opacities    Assessment & Plan      #Acute respiratory failure 2/2 drug overdose   #Aspiration pneumonia/pneumonitis  -Self extubated 9/13, now on NC O2 and stable  -Augmentin (5d course completed) changed to vancomycin given MRSA growth on PAL sample, good sample and with nares positive possible she has early MRSA pneumonia from aspiration however has no fever, leukocytosis or focal opacity on CXR and lung aeration improving. If clinically continues to improve, may stop abx entirely within next 24-48hrs    -Albuterol prn  -Pulmonology following, appreciate recs  -Step down after 24hrs post-extubation if stable    #Polysubstance drug use   #Benzo dependence  #Gabapentin overdose  - On further hx, sounds as if patient overdosed originally with gabapentin in addition to opioids. Unclear if she ever had true sz and no report sz history.  - STAT EEG showed nonspecific moderate degree slowing but no evidence of epileptic activity, tele neuro seen initially  -Cont kellie librium  dosing per pulm recs, ativan prn with reduced dose to 1mg given excess sedation response to 2mg  -Will need addiction services following discharge     #Precedex induced bradycardia   - noted in ED but unsure if patient bolused vs normal slow titration infusion  - Avoiding regardless     #Elevated CK  - Preserved renal function  - CK: max ~8K and now consistently declining  - Renal function stable and ok for PO intake now. Can hold TF and IVF once adequate PO intake     #Hypoglycemia (resolved)  - Episode on 9/10. Has resolved after initiation of tube feeds.      #Hx of hepatitis C  #Mild hepatocellular transaminitis   - Ammonia level nml. No history of cirrhosis. Do not suspect hepatic encephalopathy at this point   - Family reported they believe she is s/p treatment for the hepatitis C.   - Repeat hepatitis panel still positive for hepatitis C Ab. Outpatient viral load may be warranted depending on prior treatments.   - HIV screen negative      #Iron deficiency anemia  - Baseline hgb appears to be 11-13  - Hgb at presentaiton 11.5  - No active signs of bleeding    - B12 & folate wnl, iron studies consistent w/iron def anemia  - Started daily PO iron, no ESRD or HF to justify IV iron     #Tobacco use  - Recommend cessation   - Will offer NRT once alert/extubated     F: Soft diet per SLP eval  A: tylenol, oxycodone prn  S: -  T: pLOV  H: HOB elevated  U: Famotadine   G: PRN  S: -  B: doc/senna, miralax  I: PIVs  D: Vanc        VTE Prophylaxis:   Mechanical Order History:     None      Pharmalogical Order History:      Ordered     Dose Route Frequency Stop    09/08/22 0906  heparin (porcine) 5000 UNIT/ML injection 5,000 Units         5,000 Units SC Every 8 Hours Scheduled --                  Disposition: Cont ICU level care    I have reviewed any copied/forwarded text or data, verified its accuracy, and updated as necessary above.    Dez Mccord MD  [facility]  09/13/22  16:40 EDT

## 2022-09-13 NOTE — PROGRESS NOTES
[facility] Physician - Brief Progress Note  PERMANENT  09/13/2022 00:33    [facility] [place] - CCU - 10 - [place], [place] ([facility])    RAVI SEOToni    Date of Service 09/13/2022 00:33    HPI/Events of Note [facility] Provider Intervention Note    Patient biting ETT and fighting vent. On Fentanyl 300/ Propofol 60 and received 2mg IVP Ativan. RN requests assistance with sedation. Pt. has Triple lumen picc access.    Chart briefly reviewed, camera assessment performed and spoke with bedside nurse  has been requiring high amounts of sedaon  minimal FiO2 requirements  intermittently biting down on ETT and double triggering vent  hopeful liberation from vent tomorrow  per chart, avoiding precedex due to earlier suspected precedex induced bradycardia in ED  Will gve 5 mg versed now                Contact [facility] for any needs if bedside physician is not present.      Interventions Intermediate-Communication with other healthcare providers and/or family  Minor-Agitation/anxiety - evaluation and management        Electronically Signed by: Eula Bullard) on 09/13/2022 00:38

## 2022-09-14 ENCOUNTER — READMISSION MANAGEMENT (OUTPATIENT)
Dept: CALL CENTER | Facility: HOSPITAL | Age: 37
End: 2022-09-14

## 2022-09-14 VITALS
OXYGEN SATURATION: 98 % | SYSTOLIC BLOOD PRESSURE: 120 MMHG | WEIGHT: 241.6 LBS | HEIGHT: 66 IN | BODY MASS INDEX: 38.83 KG/M2 | DIASTOLIC BLOOD PRESSURE: 58 MMHG | RESPIRATION RATE: 16 BRPM | TEMPERATURE: 99 F | HEART RATE: 78 BPM

## 2022-09-14 LAB
ALBUMIN SERPL-MCNC: 3.14 G/DL (ref 3.5–5.2)
ANION GAP SERPL CALCULATED.3IONS-SCNC: 11.9 MMOL/L (ref 5–15)
BACTERIA SPEC RESP CULT: ABNORMAL
BACTERIA SPEC RESP CULT: ABNORMAL
BUN SERPL-MCNC: 4 MG/DL (ref 6–20)
BUN/CREAT SERPL: 11.1 (ref 7–25)
CALCIUM SPEC-SCNC: 8.3 MG/DL (ref 8.6–10.5)
CHLORIDE SERPL-SCNC: 111 MMOL/L (ref 98–107)
CO2 SERPL-SCNC: 22.1 MMOL/L (ref 22–29)
CREAT SERPL-MCNC: 0.36 MG/DL (ref 0.57–1)
DEPRECATED RDW RBC AUTO: 39.7 FL (ref 37–54)
EGFRCR SERPLBLD CKD-EPI 2021: 136.8 ML/MIN/1.73
ERYTHROCYTE [DISTWIDTH] IN BLOOD BY AUTOMATED COUNT: 13 % (ref 12.3–15.4)
GLUCOSE BLDC GLUCOMTR-MCNC: 107 MG/DL (ref 70–130)
GLUCOSE BLDC GLUCOMTR-MCNC: 111 MG/DL (ref 70–130)
GLUCOSE BLDC GLUCOMTR-MCNC: 123 MG/DL (ref 70–130)
GLUCOSE SERPL-MCNC: 108 MG/DL (ref 65–99)
GRAM STN SPEC: ABNORMAL
HCT VFR BLD AUTO: 26.7 % (ref 34–46.6)
HGB BLD-MCNC: 8.9 G/DL (ref 12–15.9)
MAGNESIUM SERPL-MCNC: 1.8 MG/DL (ref 1.6–2.6)
MCH RBC QN AUTO: 28.5 PG (ref 26.6–33)
MCHC RBC AUTO-ENTMCNC: 33.3 G/DL (ref 31.5–35.7)
MCV RBC AUTO: 85.6 FL (ref 79–97)
PHOSPHATE SERPL-MCNC: 2.7 MG/DL (ref 2.5–4.5)
PLATELET # BLD AUTO: 244 10*3/MM3 (ref 140–450)
PMV BLD AUTO: 8.4 FL (ref 6–12)
POTASSIUM SERPL-SCNC: 2.8 MMOL/L (ref 3.5–5.2)
POTASSIUM SERPL-SCNC: 3.4 MMOL/L (ref 3.5–5.2)
POTASSIUM SERPL-SCNC: 3.4 MMOL/L (ref 3.5–5.2)
RBC # BLD AUTO: 3.12 10*6/MM3 (ref 3.77–5.28)
SODIUM SERPL-SCNC: 145 MMOL/L (ref 136–145)
WBC NRBC COR # BLD: 7.63 10*3/MM3 (ref 3.4–10.8)

## 2022-09-14 PROCEDURE — 85027 COMPLETE CBC AUTOMATED: CPT | Performed by: STUDENT IN AN ORGANIZED HEALTH CARE EDUCATION/TRAINING PROGRAM

## 2022-09-14 PROCEDURE — 99239 HOSP IP/OBS DSCHRG MGMT >30: CPT | Performed by: STUDENT IN AN ORGANIZED HEALTH CARE EDUCATION/TRAINING PROGRAM

## 2022-09-14 PROCEDURE — 84132 ASSAY OF SERUM POTASSIUM: CPT | Performed by: STUDENT IN AN ORGANIZED HEALTH CARE EDUCATION/TRAINING PROGRAM

## 2022-09-14 PROCEDURE — 99232 SBSQ HOSP IP/OBS MODERATE 35: CPT | Performed by: NURSE PRACTITIONER

## 2022-09-14 PROCEDURE — 25010000002 VANCOMYCIN 5 G RECONSTITUTED SOLUTION: Performed by: STUDENT IN AN ORGANIZED HEALTH CARE EDUCATION/TRAINING PROGRAM

## 2022-09-14 PROCEDURE — 80069 RENAL FUNCTION PANEL: CPT | Performed by: STUDENT IN AN ORGANIZED HEALTH CARE EDUCATION/TRAINING PROGRAM

## 2022-09-14 PROCEDURE — 99221 1ST HOSP IP/OBS SF/LOW 40: CPT | Performed by: PSYCHIATRY & NEUROLOGY

## 2022-09-14 PROCEDURE — 25010000002 LORAZEPAM PER 2 MG: Performed by: STUDENT IN AN ORGANIZED HEALTH CARE EDUCATION/TRAINING PROGRAM

## 2022-09-14 PROCEDURE — 82962 GLUCOSE BLOOD TEST: CPT

## 2022-09-14 PROCEDURE — 94799 UNLISTED PULMONARY SVC/PX: CPT

## 2022-09-14 PROCEDURE — 83735 ASSAY OF MAGNESIUM: CPT | Performed by: STUDENT IN AN ORGANIZED HEALTH CARE EDUCATION/TRAINING PROGRAM

## 2022-09-14 PROCEDURE — 94761 N-INVAS EAR/PLS OXIMETRY MLT: CPT

## 2022-09-14 RX ORDER — AMOXICILLIN AND CLAVULANATE POTASSIUM 500; 125 MG/1; MG/1
1 TABLET, FILM COATED ORAL EVERY 12 HOURS SCHEDULED
Qty: 2 TABLET | Refills: 0 | Status: SHIPPED | OUTPATIENT
Start: 2022-09-14 | End: 2022-09-15

## 2022-09-14 RX ORDER — CHLORDIAZEPOXIDE HYDROCHLORIDE 5 MG/1
10 CAPSULE, GELATIN COATED ORAL EVERY 6 HOURS SCHEDULED
Status: DISCONTINUED | OUTPATIENT
Start: 2022-09-14 | End: 2022-09-14 | Stop reason: HOSPADM

## 2022-09-14 RX ORDER — CHLORDIAZEPOXIDE HYDROCHLORIDE 10 MG/1
10 CAPSULE, GELATIN COATED ORAL EVERY 6 HOURS SCHEDULED
Qty: 2 CAPSULE | Refills: 0 | Status: SHIPPED | OUTPATIENT
Start: 2022-09-15 | End: 2022-09-16

## 2022-09-14 RX ORDER — FERROUS SULFATE 325(65) MG
325 TABLET ORAL
Qty: 30 TABLET | Refills: 0 | Status: SHIPPED | OUTPATIENT
Start: 2022-09-15

## 2022-09-14 RX ORDER — AMOXICILLIN AND CLAVULANATE POTASSIUM 500; 125 MG/1; MG/1
1 TABLET, FILM COATED ORAL EVERY 12 HOURS SCHEDULED
Status: DISCONTINUED | OUTPATIENT
Start: 2022-09-14 | End: 2022-09-14 | Stop reason: HOSPADM

## 2022-09-14 RX ORDER — POTASSIUM CHLORIDE 20 MEQ/1
40 TABLET, EXTENDED RELEASE ORAL EVERY 4 HOURS
Status: COMPLETED | OUTPATIENT
Start: 2022-09-14 | End: 2022-09-14

## 2022-09-14 RX ADMIN — GABAPENTIN 800 MG: 400 CAPSULE ORAL at 08:19

## 2022-09-14 RX ADMIN — Medication 10 ML: at 08:20

## 2022-09-14 RX ADMIN — CHLORDIAZEPOXIDE HYDROCHLORIDE 25 MG: 25 CAPSULE ORAL at 11:03

## 2022-09-14 RX ADMIN — SODIUM CHLORIDE 75 ML/HR: 9 INJECTION, SOLUTION INTRAVENOUS at 02:19

## 2022-09-14 RX ADMIN — CHLORHEXIDINE GLUCONATE 15 ML: 1.2 RINSE ORAL at 08:19

## 2022-09-14 RX ADMIN — POTASSIUM CHLORIDE 40 MEQ: 20 TABLET, EXTENDED RELEASE ORAL at 11:03

## 2022-09-14 RX ADMIN — GABAPENTIN 800 MG: 400 CAPSULE ORAL at 16:16

## 2022-09-14 RX ADMIN — LORAZEPAM 1 MG: 2 INJECTION INTRAMUSCULAR; INTRAVENOUS at 02:19

## 2022-09-14 RX ADMIN — NICOTINE 1 PATCH: 14 PATCH, EXTENDED RELEASE TRANSDERMAL at 08:21

## 2022-09-14 RX ADMIN — CHLORDIAZEPOXIDE HYDROCHLORIDE 10 MG: 5 CAPSULE ORAL at 17:11

## 2022-09-14 RX ADMIN — FAMOTIDINE 20 MG: 10 INJECTION, SOLUTION INTRAVENOUS at 08:20

## 2022-09-14 RX ADMIN — POTASSIUM CHLORIDE 40 MEQ: 20 TABLET, EXTENDED RELEASE ORAL at 03:42

## 2022-09-14 RX ADMIN — VANCOMYCIN HYDROCHLORIDE 1500 MG: 5 INJECTION, POWDER, LYOPHILIZED, FOR SOLUTION INTRAVENOUS at 06:38

## 2022-09-14 RX ADMIN — POTASSIUM CHLORIDE 40 MEQ: 20 TABLET, EXTENDED RELEASE ORAL at 06:38

## 2022-09-14 RX ADMIN — AMOXICILLIN AND CLAVULANATE POTASSIUM 500 MG: 500; 125 TABLET, FILM COATED ORAL at 11:03

## 2022-09-14 RX ADMIN — CHLORDIAZEPOXIDE HYDROCHLORIDE 25 MG: 25 CAPSULE ORAL at 06:38

## 2022-09-14 RX ADMIN — FERROUS SULFATE TAB 325 MG (65 MG ELEMENTAL FE) 325 MG: 325 (65 FE) TAB at 08:19

## 2022-09-14 NOTE — CASE MANAGEMENT/SOCIAL WORK
Discharge Planning Assessment   [facility]     Patient Name: Lillian Ayon  MRN: [id number]  Today's Date: 9/12/2022    Admit Date: 9/7/2022     Discharge Plan     Row Name 09/12/22 1531       Plan    Plan Pt was admitted on 09/07/22 with overdose. Pt lives with her mother and significant other. Mother states she is not sure if pt will need to return to [facility] at discharge, but is willing for pt to return home pending clinical improvement. SS unable to speak with pt due to being intubated and sedated at this time. SS to follow.           ALEXANDRIA Daley    
Discharge Planning Assessment   [facility]     Patient Name: Lillian Ayon  MRN: [id number]  Today's Date: 9/9/2022    Admit Date: 9/7/2022     Discharge Needs Assessment     Row Name 09/09/22 1459       Living Environment    People in Home parent(s);sibling(s)    Name(s) of People in Home Yessy Ayon (Mother)    Current Living Arrangements home    Primary Care Provided by self    Provides Primary Care For no one    Family Caregiver if Needed parent(s);significant other    Quality of Family Relationships helpful;involved;supportive    Able to Return to Prior Arrangements yes       Discharge Needs Assessment    Equipment Currently Used at Home none    Concerns to be Addressed discharge planning    Outpatient/Agency/Support Group Needs psychiatric facility    Current Discharge Risk substance use/abuse               Discharge Plan     Row Name 09/09/22 1501       Plan    Plan Pt was admitted on 09/07/22 with overdose. SS recieved CM consult for from [facility]. Pt is intubated at 40&, PEEP of 5. SS spoke with Pt's Mother, Yessy [phone]. Pt lives with her mother and significant other. SS attempted to call [facility] but was not successful. Mother states she is not sure if Pt will need to return to [facility] at discharge, but is willing for Pt to return home pending clinical improvement. Pt does not utilize home health and DME. Pt's PCP is Jason apple. Pt utilizes [facility]. SS to follow.                  SARBJIT FloresW    
Discharge Planning Assessment  STEVE Degroot     Patient Name: Lillian Ayon  MRN: [id number]  Today's Date: 9/14/2022    Admit Date: 9/7/2022           Discharge Plan     Row Name 09/14/22 1133       Plan    Plan Pt was discussed in rounds on this date. SS spoke with [facility] [phone] per Ludmila who states Pt has been released from custody and will not have to return when discharged. SS notified Dr. Mccord and RN. SS to follow.    16:21pm: Pt has been transferred from CCU to [address] [place] [address] on this date. Psychiatry consulted and determined Pt is not suicidal and Pt plans following up with her MAT program for Methadone. Pt lives with her Mother and significant other. Pt does not utilize home health services. Pt has a standard walker for DME needs. Pt's family to provide transportation. SS to follow.                                  Kenia Aponte, SARBJITW    
Discharge Planning Assessment  STEVE Degroot     Patient Name: Lillian Ayon  MRN: [id number]  Today's Date: 9/13/2022    Admit Date: 9/7/2022           Discharge Plan     Row Name 09/13/22 1107       Plan    Plan Pt was discussed in rounds on this date. Physician plans to possibly extubate Pt on this date. SS to follow.                ALEXANDRIA Flores    
no

## 2022-09-14 NOTE — CONSULTS
Referring Provider: Dr. Mccord  Reason for Consultation: Overdose      Chief complaint/Focus of Exam: Overdose    Subjective .     History of present illness:  Lillian Ayon is a 36 y.o. female who was admitted on 9/7/2022 with complaints of overdose. The patient reports she was in prison and was given a bunch of pills by another inmate because she didn't want to be trouble, and patient took them instead of throwing them away. She reports she took about 20 gabapentin 800 mg tablets. She has a history of opioid use disorder and is on MAT with methadone and was taking 120 mg daily. She denies it was a suicidal intent. She reports she plans to go back to medication assisted treatment.     Review of Systems  Gen: No fever, chills  Resp: No soa  GI: No nvd  Musculoskeletal: back ache    History  Past Medical History:   Diagnosis Date   • Anxiety    • Arthritis    • Bradycardia    • Fibromyalgia    • Heart disease    • Hepatitis C    • Myalgia    ,   Past Surgical History:   Procedure Laterality Date   • CHOLECYSTECTOMY     • NERVE SURGERY Right    , History reviewed. No pertinent family history.,   Social History     Socioeconomic History   • Marital status: Single   Tobacco Use   • Smoking status: Current Every Day Smoker     Packs/day: 0.50     Years: 15.00     Pack years: 7.50     Types: Cigarettes   • Smokeless tobacco: Never Used   Vaping Use   • Vaping Use: Unknown   Substance and Sexual Activity   • Alcohol use: No   • Drug use: Yes     Types: Amphetamines, Methamphetamines, Benzodiazepines   • Sexual activity: Defer     E-cigarette/Vaping   • E-cigarette/Vaping Use Unknown If Ever Used      E-cigarette/Vaping Substances     E-cigarette/Vaping Devices       ,   Medications Prior to Admission   Medication Sig Dispense Refill Last Dose   • methadone (DOLOPHINE) 10 MG tablet Take 125 mg by mouth Daily,   9/6/2022   , Scheduled Meds:  amoxicillin-clavulanate, 1 tablet, Oral, Q12H  chlordiazePOXIDE, 25 mg, Per G  Tube, Q6H  chlorhexidine, 15 mL, Mouth/Throat, Q12H  enoxaparin, 40 mg, Subcutaneous, Nightly  ferrous sulfate, 325 mg, Oral, Daily With Breakfast  gabapentin, 800 mg, Oral, TID  nicotine, 1 patch, Transdermal, Q24H  senna-docusate sodium, 2 tablet, Oral, Nightly  sodium chloride, 10 mL, Intravenous, Q12H  sodium chloride, 10 mL, Intravenous, Q12H  sodium chloride, 10 mL, Intravenous, Q12H  sodium chloride, 10 mL, Intravenous, Q12H    , Continuous Infusions:  fentaNYL Citrate, , Last Rate: Stopped (09/13/22 1502)  Pharmacy to dose vancomycin,     , PRN Meds:  •  acetaminophen  •  albuterol  •  dextrose  •  dextrose  •  dextrose  •  dextrose  •  dextrose  •  glucagon (human recombinant)  •  glucagon (human recombinant)  •  LORazepam  •  magnesium sulfate **OR** magnesium sulfate **OR** magnesium sulfate  •  melatonin  •  ondansetron ODT  •  oxyCODONE  •  Pharmacy to dose vancomycin  •  polyethylene glycol  •  potassium chloride  •  potassium chloride  •  sodium chloride  •  sodium chloride  •  sodium chloride and Allergies:  Adhesive tape, Bactrim [sulfamethoxazole-trimethoprim], Codeine, and Keflex [cephalexin]    Objective     Vital Signs   Temp:  [98.4 °F (36.9 °C)-99.3 °F (37.4 °C)] 98.4 °F (36.9 °C)  Heart Rate:  [] 80  Resp:  [11-19] 12  BP: (104-174)/() 125/67    Mental Status Exam:   Mental Status Exam:    Hygiene:   fair  Cooperation:  Cooperative  Eye Contact:  Good  Psychomotor Behavior:  Appropriate  Affect:  Appropriate  Hopelessness: Denies  Speech:  Normal  Thought Progress:  Goal directed  Thought Content:  Normal  Suicidal:  None  Homicidal:  None  Hallucinations:  None  Delusion:  None  Memory:  Intact  Orientation:  Person, Place, Time and Situation  Reliability:  fair  Insight:  Fair  Judgement:  Fair  Impulse Control:  Fair    Results Review:   I reviewed the patient's new clinical results.  Lab Results (last 24 hours)     Procedure Component Value Units Date/Time    Magnesium  [[id number]]  (Normal) Collected: 09/14/22 1120    Specimen: Blood Updated: 09/14/22 1200     Magnesium 1.8 mg/dL     Potassium [[id number]]  (Abnormal) Collected: 09/14/22 1120    Specimen: Blood Updated: 09/14/22 1200     Potassium 3.4 mmol/L     POC Glucose Once [[id number]]  (Normal) Collected: 09/14/22 1039    Specimen: Blood Updated: 09/14/22 1045     Glucose 123 mg/dL      Comment: Meter: [id number] : [id number] JENNIFER WELLS       Respiratory Culture - Lavage, Alveoli [[id number]]  (Abnormal)  (Susceptibility) Collected: 09/12/22 1531    Specimen: Lavage from Alveoli Updated: 09/14/22 0939     Respiratory Culture Light growth (2+) Staphylococcus aureus, MRSA     Comment: Methicillin resistant Staphylococcus aureus, Patient may be an isolation risk.         No Normal Respiratory Hilary     Gram Stain Moderate (3+) WBCs seen      Rare (1+) Epithelial cells seen      No organisms seen    Susceptibility      Staphylococcus aureus, MRSA      ANIL      Clindamycin Susceptible      Inducible Clindamycin Resistance Negative      Linezolid Susceptible      Oxacillin Resistant     Tetracycline Susceptible      Trimethoprim + Sulfamethoxazole Susceptible      Vancomycin Susceptible                    Linear View               Susceptibility Comments     Staphylococcus aureus, MRSA    This isolate does not demonstrate inducible clindamycin resistance in vitro.               POC Glucose Once [[id number]]  (Normal) Collected: 09/14/22 0646    Specimen: Blood Updated: 09/14/22 0653     Glucose 111 mg/dL      Comment: Meter: [id number] : [id number] CHRIS MONAE       Renal Function Panel [[id number]]  (Abnormal) Collected: 09/14/22 0026    Specimen: Blood Updated: 09/14/22 0147     Glucose 108 mg/dL      BUN 4 mg/dL      Creatinine 0.36 mg/dL      Sodium 145 mmol/L      Potassium 2.8 mmol/L      Chloride 111 mmol/L      CO2 22.1 mmol/L      Calcium 8.3 mg/dL      Albumin 3.14 g/dL      Phosphorus 2.7 mg/dL      Anion Gap  11.9 mmol/L      BUN/Creatinine Ratio 11.1     eGFR 136.8 mL/min/1.73      Comment: National Kidney Foundation and American Society of Nephrology (ASN) Task Force recommended calculation based on the Chronic Kidney Disease Epidemiology Collaboration (CKD-EPI) equation refit without adjustment for race.       Narrative:      GFR Normal >60  Chronic Kidney Disease <60  Kidney Failure <15      CBC (No Diff) [[id number]]  (Abnormal) Collected: 09/14/22 0026    Specimen: Blood Updated: 09/14/22 0104     WBC 7.63 10*3/mm3      RBC 3.12 10*6/mm3      Hemoglobin 8.9 g/dL      Hematocrit 26.7 %      MCV 85.6 fL      MCH 28.5 pg      MCHC 33.3 g/dL      RDW 13.0 %      RDW-SD 39.7 fl      MPV 8.4 fL      Platelets 244 10*3/mm3     POC Glucose Once [[id number]]  (Abnormal) Collected: 09/13/22 2300    Specimen: Blood Updated: 09/13/22 2307     Glucose 157 mg/dL      Comment: Meter: [id number] : [id number] CHRIS MONAE       Blood Culture - Blood, Arm, Right [[id number]]  (Normal) Collected: 09/10/22 1409    Specimen: Blood from Arm, Right Updated: 09/13/22 1547     Blood Culture No growth at 3 days    Blood Culture - Blood, Arm, Left [[id number]]  (Normal) Collected: 09/10/22 1409    Specimen: Blood from Arm, Left Updated: 09/13/22 1547     Blood Culture No growth at 3 days        Imaging Results (Last 24 Hours)     ** No results found for the last 24 hours. **            Assessment & Plan     Active Problems:    Overdose of undetermined intent, initial encounter     The patient denies that she took the pills with intention to self harm, and denies feeling hopeless or suicidal. She may be discharged once medically stable. Her last dose of methadone was about 8 days ago. She plans to go back to the methadone clinic and resume medication assisted treatment with methadone for opioid use disorder.     I discussed the patients findings and my recommendations with patient and nursing staff    Javier Jordan MD  09/14/22  14:31  EDT

## 2022-09-14 NOTE — PROGRESS NOTES
"  PROGRESS NOTE  Patient Name: Lillian Ayon  Age/Sex: 36 y.o. female  : 1985  MRN: [id number]    Date of Admission: 2022  Date of Encounter Visit: 22   LOS: 6 days   Patient Care Team:  Jason Mathew MD as PCP - General (Family Medicine)    Chief Complaint: Patient was admitted with respiratory failure due to drug overdose and was intubated for airway protection    Hospital course: Patient is doing pretty well, was liberated from the ventilator on 2022, currently on room air.  Denies any suicidal ideation  She is afebrile  She did grow MRSA in her sputum and her medication were switched to vancomycin even though she was doing clinically better    REVIEW OF SYSTEMS:   CONSTITUTIONAL: no fever or chills  No chest pain, no nausea or vomiting since extubation  Ventilator/Non-Invasive Ventilation Settings (From admission, onward)             Start     Ordered       22 1001       22 2333                  Vital Signs  Temp:  [98.4 °F (36.9 °C)-99.3 °F (37.4 °C)] (P) 98.4 °F (36.9 °C)  Heart Rate:  [] 80  Resp:  [11-19] 16  BP: (104-174)/() 117/86  FiO2 (%):  [30 %] 30 %  SpO2:  [90 %-100 %] 99 %  on  Flow (L/min):  [2] 2 Device (Oxygen Therapy): room air    Intake/Output Summary (Last 24 hours) at 2022 0910  Last data filed at 2022 0800  Gross per 24 hour   Intake 2164.69 ml   Output 6750 ml   Net -4585.31 ml     Flowsheet Rows    Flowsheet Row First Filed Value   Admission Height 167.6 cm (66\") Documented at 2022   Admission Weight 99.8 kg (220 lb) Documented at 2022        Body mass index is 39.01 kg/m².      09/10/22  0441 22  0500 22  0500   Weight: 109 kg (240 lb 8.4 oz) 110 kg (242 lb 8.1 oz) 110 kg (241 lb 9.6 oz)       Physical Exam:  GEN:  awake and responsive calm and cooperative, oriented x4  EYES:   Sclerae clear. No icterus. PERRL.     ENT:   External ears/nose normal, no oral lesions, no thrush, mucous " membranes moist,   NECK:  Supple, midline trachea, no JVD  LUNGS: Normal chest on inspection, CTAB, no wheezes. No rhonchi. No crackles. Respirations regular, even and unlabored.   CV:  Regular rhythm and rate. Normal S1/S2. No murmurs, gallops, or rubs noted.  ABD:  Soft, nontender and nondistended. Normal bowel sounds. No guarding  EXT:  Moves all extremities well. No cyanosis. No redness.  Trace edema.  She has evidence of needle tracks from IV drug abuse  Skin: Dry, intact, no bleeding    Results Review:    Results From Last 14 Days   Lab Units 09/13/22  0129 09/08/22  0003   LACTATE mmol/L  --  1.8   TRIGLYCERIDES mg/dL 165*  --      Results from last 7 days   Lab Units 09/14/22  0026 09/13/22  0129 09/12/22  0103 09/11/22  0040 09/10/22  1409 09/10/22  0111 09/09/22  0042 09/08/22  0003   SODIUM mmol/L 145 139 137 136  --  139 136 139   POTASSIUM mmol/L 2.8* 3.7 3.4* 3.5 4.0 3.6 3.8 3.3*   CHLORIDE mmol/L 111* 104 103 104  --  109* 108* 104   CO2 mmol/L 22.1 22.4 24.4 22.5  --  18.8* 19.6* 22.1   BUN mg/dL 4* 4* 5* 4*  --  5* 6 8   CREATININE mg/dL 0.36* 0.37* 0.38* 0.38*  --  0.46* 0.59 0.71   CALCIUM mg/dL 8.3* 8.8 8.4* 8.2*  --  7.6* 7.8* 8.9   AST (SGOT) U/L  --   --  <5 53*  --  81* 10 47*   ALT (SGPT) U/L  --   --  <5 <5  --  37* <5 26   ANION GAP mmol/L 11.9 12.6 9.6 9.5  --  11.2 8.4 12.9   ALBUMIN g/dL 3.14* 3.07* 2.53* 2.54*  --  2.61* 2.50* 4.31     Results from last 7 days   Lab Units 09/13/22  0129 09/11/22  0040 09/10/22  0111 09/08/22  1005 09/08/22  0003   CK TOTAL U/L 1,172* 2,278* 4,694*   < > 1,775*   TROPONIN T ng/mL  --   --   --   --  <0.010    < > = values in this interval not displayed.     Results from last 7 days   Lab Units 09/08/22  0003   TSH uIU/mL 0.492         Results from last 7 days   Lab Units 09/14/22  0026 09/13/22  0129 09/12/22  0103 09/11/22  0040 09/10/22  0111 09/09/22  0042 09/08/22  0003   WBC 10*3/mm3 7.63 6.78 6.92 6.26 6.20 6.48 22.57*   HEMOGLOBIN g/dL 8.9* 9.3*  9.0* 8.8* 9.0* 8.9* 11.5*   HEMATOCRIT % 26.7* 29.0* 26.6* 26.3* 28.4* 26.7* 35.8   PLATELETS 10*3/mm3 244 224 196 171 165 166 266   MCV fL 85.6 87.6 87.5 88.3 91.3 91.1 86.5   NEUTROPHIL % %  --   --  59.4 61.2 62.9 74.3 92.7*   LYMPHOCYTE % %  --   --  28.3 27.6 26.0 18.8* 2.4*   MONOCYTES % %  --   --  7.7 6.9 7.7 6.2 4.3*   EOSINOPHIL % %  --   --  4.0 3.7 2.6 0.2* 0.0*   BASOPHIL % %  --   --  0.3 0.3 0.5 0.2 0.2   IMM GRAN % %  --   --  0.3 0.3 0.3 0.3 0.4     Results from last 7 days   Lab Units 09/08/22  0003   INR  1.02   APTT seconds 25.1*     Results from last 7 days   Lab Units 09/08/22  1005 09/08/22  0003   MAGNESIUM mg/dL 2.2 2.2     Results from last 7 days   Lab Units 09/13/22  0129   TRIGLYCERIDES mg/dL 165*     Results from last 7 days   Lab Units 09/10/22  0503 09/09/22  0923 09/08/22  0918 09/08/22  0204 09/08/22  0202 09/07/22  2327   PH, ARTERIAL pH units 7.376 7.392 7.414 7.329* 7.301* 7.345*   PCO2, ARTERIAL mm Hg 35.1 35.2 35.2 42.8 47.1* 38.2   PO2 ART mm Hg 101.0 94.7 84.3 172.0* 66.1* 99.0   HCO3 ART mmol/L 20.6 21.3 22.5 22.5 23.2 20.9         Glucose   Date/Time Value Ref Range Status   09/14/2022 0646 111 70 - 130 mg/dL Final     Comment:     Meter: [id number] : [id number] CHRIS MONAE   09/13/2022 2300 157 (H) 70 - 130 mg/dL Final     Comment:     Meter: [id number] : [id number] CHRIS MONAE     Results from last 7 days   Lab Units 09/08/22  0003   PROCALCITONIN ng/mL 0.06   LACTATE mmol/L 1.8     Results from last 7 days   Lab Units 09/12/22  1531 09/10/22  1409 09/08/22  0205 09/08/22  0129   BLOODCX   --  No growth at 3 days  No growth at 3 days Corynebacterium species* No growth at 5 days   RESPCX  Light growth (2+) Staphylococcus aureus, MRSA*  No Normal Respiratory Hilary*  --   --   --    BCIDPCR   --   --  Negative by BCID PCR. Culture to Follow.  --      Results from last 7 days   Lab Units 09/08/22  0112   NITRITE UA  Negative   WBC UA /HPF 0-2   BACTERIA UA /HPF  None Seen   SQUAM EPITHEL UA /HPF None Seen     Results from last 7 days   Lab Units 09/08/22 0212   COVID19  Not Detected   INFLUENZA B PCR  Not Detected               Imaging:   Imaging Results (All)           I reviewed the patient's new clinical results.  I personally viewed and interpreted the patient's imaging results: Chest x-ray probably was an expiratory film.  The reduced lung volume despite the adequate ABG results.  Patient had some bibasilar infiltrate on the CAT scan more so on the left side consistent with pneumonia/atelectasis        Medication Review:   chlordiazePOXIDE, 25 mg, Per G Tube, Q6H  chlorhexidine, 15 mL, Mouth/Throat, Q12H  enoxaparin, 40 mg, Subcutaneous, Nightly  famotidine, 20 mg, Intravenous, Q12H  ferrous sulfate, 325 mg, Oral, Daily With Breakfast  gabapentin, 800 mg, Oral, TID  nicotine, 1 patch, Transdermal, Q24H  potassium chloride, 40 mEq, Oral, Q4H  senna-docusate sodium, 2 tablet, Oral, Nightly  sodium chloride, 10 mL, Intravenous, Q12H  sodium chloride, 10 mL, Intravenous, Q12H  sodium chloride, 10 mL, Intravenous, Q12H  sodium chloride, 10 mL, Intravenous, Q12H  vancomycin, 1,500 mg, Intravenous, Q12H        fentaNYL Citrate, , Last Rate: Stopped (09/13/22 1502)  norepinephrine, 0.02-0.3 mcg/kg/min, Last Rate: Stopped (09/08/22 1401)  Pharmacy to dose vancomycin,   propofol, 5-60 mcg/kg/min, Last Rate: Stopped (09/13/22 0934)  sodium chloride, 75 mL/hr, Last Rate: 75 mL/hr (09/14/22 0219)        ASSESSMENT:   1. Acute respiratory failure, inability to protect airway related to overdose, liberated from the ventilator now on room air  2. Drug overdose on presentation  3. Urine toxicology positive for multiple substances: Methamphetamines, amphetamines, benzodiazepines, methadone  4. Bilateral lower lobe pneumonia, likely aspiration  5. Hepatitis C  6. Mild normocytic anemia  7. History of anxiety, fibromyalgia  8. History of drug abuse  9. Mild rhabdomyolysis on  presentation, CPK trending down  10. Hypokalemia    PLAN:  Summary of recommendations:  · Patient is clinically better and was improving on the Augmentin and will reestablish the old regimen and finish as scheduled, discussed with the primary team, the vancomycin is not necessary since she was clinically improving on the prior regimen.  The MRSA is likely a colonizer.  · Patient is cleared to transfer out of the ICU  · We will follow-up as needed      Discussed with ICU rounding team, with the primary team and with the patient    Disposition: Okay to go home from the pulmonary standpoint    Bolivar Cervantes MD  09/14/22  09:10 EDT      Dictated utilizing Dragon dictation

## 2022-09-14 NOTE — PLAN OF CARE
Problem: Adult Inpatient Plan of Care  Goal: Plan of Care Review  9/14/2022 0520 by Marie Trujillo RN  Outcome: Ongoing, Progressing  Flowsheets  Taken 9/14/2022 0520  Progress: improving  Taken 9/14/2022 0506  Outcome Evaluation: Pt is alert and oriented x3 on room air. Pt has had several BM's throughout shift. Pt has been given PRN ativan for anxiety and restlessness. Pt is resting better now. SO at bedside and attentive to pt. VSS. WCTM.  9/14/2022 0506 by Marie Trujillo, RN  Outcome: Ongoing, Progressing  Flowsheets (Taken 9/14/2022 0506)  Progress: improving  Plan of Care Reviewed With:   patient   significant other  Outcome Evaluation: Pt is alert and oriented x3 on room air. Pt has had several BM's throughout shift. Pt has been given PRN ativan for anxiety and restlessness. Pt is resting better now. SO at bedside and attentive to pt. VSS. WCTM.  9/14/2022 0505 by Marie Trujillo RN  Outcome: Ongoing, Progressing   Goal Outcome Evaluation:  Plan of Care Reviewed With: patient, significant other        Progress: improving  Outcome Evaluation: Pt is alert and oriented x3 on room air. Pt has had several BM's throughout shift. Pt has been given PRN ativan for anxiety and restlessness. Pt is resting better now. SO at bedside and attentive to pt. VSS. WCTM.

## 2022-09-14 NOTE — PLAN OF CARE
Goal Outcome Evaluation:  Pt transferred from CCU. Pt resting in bed. No acute changes this shift. VSS. Will continue plan of care.

## 2022-09-14 NOTE — PROGRESS NOTES
[facility] HOSPITALIST PROGRESS NOTE     Patient Identification:  Name:  Lillian Ayon  Age:  36 y.o.  Sex:  female  :  1985  MRN:  [id number]  Visit Number:  [id number]  ROOM: [address]     Primary Care Provider:  Jason Mathew MD     Date of Admission: 2022    Length of stay in inpatient status:  6    Subjective     Chief Compliant:    Chief Complaint   Patient presents with   • Drug Overdose       This morning much more alert and interactive, memory improved. Shared that she was given gabapentin tablets by her cell mate at the detention and when faced with being caught with these by guard, swallowed all of them instead. Again reports no known hx of seizures, no SI or intentional overdose. Plans to return to methadone clinic after discharge to resume therapy. No actve withdrawal currently.    CV: No cp or dyspnea  GI: No diarrhea or consitpation      Objective     Current Hospital Meds:  amoxicillin-clavulanate, 1 tablet, Oral, Q12H  chlordiazePOXIDE, 25 mg, Per G Tube, Q6H  chlorhexidine, 15 mL, Mouth/Throat, Q12H  enoxaparin, 40 mg, Subcutaneous, Nightly  ferrous sulfate, 325 mg, Oral, Daily With Breakfast  gabapentin, 800 mg, Oral, TID  nicotine, 1 patch, Transdermal, Q24H  senna-docusate sodium, 2 tablet, Oral, Nightly  sodium chloride, 10 mL, Intravenous, Q12H  sodium chloride, 10 mL, Intravenous, Q12H  sodium chloride, 10 mL, Intravenous, Q12H  sodium chloride, 10 mL, Intravenous, Q12H    fentaNYL Citrate, , Last Rate: Stopped (22 1502)  Pharmacy to dose vancomycin,       Current Antimicrobial Therapy:  Anti-Infectives (From admission, onward)    Ordered     Dose/Rate Route Frequency Start Stop    22 1025  amoxicillin-clavulanate (AUGMENTIN) 500-125 MG per tablet 500 mg        Ordering Provider: Bolivar Cervantes MD    1 tablet Oral Every 12 Hours Scheduled 22 1115 22 0859    22 1658  vancomycin 2000 mg/500 mL 0.9% NS IVPB ([facility])        Ordering  Provider: Dez Mccord MD    2,000 mg  over 120 Minutes Intravenous Once 09/13/22 1800 09/13/22 1946    09/13/22 1643  Pharmacy to dose vancomycin        Ordering Provider: Dez Mccord MD     Does not apply Continuous PRN 09/13/22 1642 09/18/22 1641    09/08/22 1216  piperacillin-tazobactam (ZOSYN) IVPB 3.375 g in 100 mL NS VTB        Ordering Provider: Freddie Nava MD    3.375 g  over 30 Minutes Intravenous Once 09/08/22 1315 09/08/22 1521    09/08/22 0824  cefepime (MAXIPIME) 2 g/100 mL 0.9% NS (mbp)        Ordering Provider: Freddie Nava MD    2 g  200 mL/hr over 30 Minutes Intravenous Once 09/08/22 0900 09/08/22 0947    09/08/22 0626  metroNIDAZOLE (FLAGYL) IVPB 500 mg        Ordering Provider: Jareth Mendez MD    500 mg  over 30 Minutes Intravenous Once 09/08/22 0628 09/08/22 0710    09/07/22 2233  cefepime (MAXIPIME) 2 g/100 mL 0.9% NS (mbp)        Ordering Provider: Damaris Escalante DO    2 g  200 mL/hr over 30 Minutes Intravenous Once 09/07/22 2235 09/08/22 0246        Current Diuretic Therapy:  Diuretics (From admission, onward)    None        ----------------------------------------------------------------------------------------------------------------------  Vital Signs:  Temp:  [98.4 °F (36.9 °C)-99.3 °F (37.4 °C)] 98.4 °F (36.9 °C)  Heart Rate:  [] 80  Resp:  [11-19] 12  BP: (104-174)/() 125/67  SpO2:  [90 %-100 %] 98 %  on  Flow (L/min):  [2] 2;   Device (Oxygen Therapy): room air  Body mass index is 39.01 kg/m².    Wt Readings from Last 3 Encounters:   09/12/22 110 kg (241 lb 9.6 oz)   06/26/22 99.8 kg (220 lb)   04/28/22 90.7 kg (200 lb)     Intake & Output (last 3 days)       09/11 0701  09/12 0700 09/12 0701  09/13 0700 09/13 0701  09/14 0700 09/14 0701  09/15 0700    P.O.    120    I.V. (mL/kg) 3630.4 (33) 4045.7 (36.8) 1044.7 (9.5)     Other 463 20      NG/GT 1126       IV Piggyback 100  1000     Total Intake(mL/kg) 5319.4 (48.4) 4065.7 (37) 2044.7 (18.6) 120  (1.1)    Urine (mL/kg/hr) 5730 (2.2) 5530 (2.1) 6750 (2.6) 600 (0.7)    Stool 0 0 0 0    Total Output 5730 5530 6750 600    Net -410.6 -1464.3 -4705.3 -480            Urine Unmeasured Occurrence    2 x    Stool Unmeasured Occurrence   10 x 1 x        Diet Soft Texture; [employer]; Thin  ----------------------------------------------------------------------------------------------------------------------  Physical Exam  Vitals and nursing note reviewed.   Constitutional:       Appearance: She is obese.      Comments: Awake and alert  HENT:      Head: Normocephalic and atraumatic.      Mouth/Throat:      Pharynx: Oropharynx is clear.      Comments: Coughing up opaque sputum  Eyes:      General: No scleral icterus.     Conjunctiva/sclera: Conjunctivae normal.   Cardiovascular:      Rate and Rhythm: Normal rate and regular rhythm.      Pulses: Normal pulses.      Heart sounds: No murmur heard.  Pulmonary:      Breath sounds: No wheezing.      Comments: On RA  Abdominal:      Palpations: Abdomen is soft.      Tenderness: There is no guarding.   Musculoskeletal:      Right lower leg: No edema.      Left lower leg: No edema.   Skin:     General: Skin is warm and dry.   Neurological:      Mental Status: She is alert, oriented x3     Comments: Moves all 4 extremities spontaneously    Psychiatric:      Comments: Mood stable, interactive and appropriate      ----------------------------------------------------------------------------------------------------------------------    ----------------------------------------------------------------------------------------------------------------------  LABS:    CBC and coagulation:  Results from last 7 days   Lab Units 09/14/22  0026 09/13/22  0129 09/12/22  0103 09/11/22  0040 09/10/22  0111 09/09/22  0042 09/08/22  0003   PROCALCITONIN ng/mL  --   --   --   --   --   --  0.06   LACTATE mmol/L  --   --   --   --   --   --  1.8   WBC 10*3/mm3 7.63 6.78 6.92 6.26 6.20 6.48 22.57*  [General Appearance - Alert] : alert   HEMOGLOBIN g/dL 8.9* 9.3* 9.0* 8.8* 9.0* 8.9* 11.5*   HEMATOCRIT % 26.7* 29.0* 26.6* 26.3* 28.4* 26.7* 35.8   MCV fL 85.6 87.6 87.5 88.3 91.3 91.1 86.5   MCHC g/dL 33.3 32.1 33.8 33.5 31.7 33.3 32.1   PLATELETS 10*3/mm3 244 224 196 171 165 166 266   INR   --   --   --   --   --   --  1.02     Acid/base balance:  Results from last 7 days   Lab Units 09/10/22  0503 09/09/22  0923 09/08/22  0918 09/08/22  0204 09/08/22  0202 09/07/22  2327   PH, ARTERIAL pH units 7.376 7.392 7.414 7.329* 7.301* 7.345*   PO2 ART mm Hg 101.0 94.7 84.3 172.0* 66.1* 99.0   PCO2, ARTERIAL mm Hg 35.1 35.2 35.2 42.8 47.1* 38.2   HCO3 ART mmol/L 20.6 21.3 22.5 22.5 23.2 20.9     Renal and electrolytes:  Results from last 7 days   Lab Units 09/14/22  1120 09/14/22  0026 09/13/22  0129 09/12/22  0103 09/11/22  0040 09/10/22  1409 09/10/22  0111 09/09/22  0042 09/08/22  1005 09/08/22  0003   SODIUM mmol/L  --  145 139 137 136  --  139 136  --  139   POTASSIUM mmol/L 3.4* 2.8* 3.7 3.4* 3.5 4.0 3.6 3.8  --  3.3*   MAGNESIUM mg/dL 1.8  --   --   --   --   --   --   --  2.2 2.2   CHLORIDE mmol/L  --  111* 104 103 104  --  109* 108*  --  104   CO2 mmol/L  --  22.1 22.4 24.4 22.5  --  18.8* 19.6*  --  22.1   BUN mg/dL  --  4* 4* 5* 4*  --  5* 6  --  8   CREATININE mg/dL  --  0.36* 0.37* 0.38* 0.38*  --  0.46* 0.59  --  0.71   CALCIUM mg/dL  --  8.3* 8.8 8.4* 8.2*  --  7.6* 7.8*  --  8.9   PHOSPHORUS mg/dL  --  2.7 3.7  --   --   --   --   --   --  2.6   GLUCOSE mg/dL  --  108* 84 100* 119*  --  71 84  --  173*     Estimated Creatinine Clearance: 276.7 mL/min (A) (by C-G formula based on SCr of 0.36 mg/dL (L)).    Liver and pancreatic function:  Results from last 7 days   Lab Units 09/14/22  0026 09/13/22  0129 09/12/22  0103 09/11/22  0040 09/10/22  0111 09/09/22  0042 09/08/22  0003   ALBUMIN g/dL 3.14* 3.07* 2.53* 2.54* 2.61* 2.50* 4.31   BILIRUBIN mg/dL  --   --  0.2 0.2 0.2 0.2 0.4   ALK PHOS U/L  --   --  60 59 63 61 91   AST (SGOT) U/L  --   --  [Sclera] : the sclera and conjunctiva were normal  <5 53* 81* 10 47*   ALT (SGPT) U/L  --   --  <5 <5 37* <5 26   AMMONIA umol/L  --   --   --   --   --   --  25     Endocrine function:  No results found for: HGBA1C  Point of care bedside glucose levels:  Results from last 7 days   Lab Units 09/14/22  1039 09/14/22  0646 09/13/22  2300 09/10/22  0802 09/10/22  0654 09/08/22  0229 09/07/22  2258   GLUCOSE mg/dL 123 111 157* 93 64* 143* 224*     Glucose levels from the [facility]:  Results from last 7 days   Lab Units 09/14/22  0026 09/13/22  0129 09/12/22  0103 09/11/22  0040 09/10/22  0111 09/09/22  0042 09/08/22  0003   GLUCOSE mg/dL 108* 84 100* 119* 71 84 173*     Lab Results   Component Value Date    TSH 0.492 09/08/2022     Cardiac:  Results from last 7 days   Lab Units 09/13/22  0129 09/11/22  0040 09/10/22  0111 09/09/22  0042 09/08/22  1005 09/08/22  0003   CK TOTAL U/L 1,172* 2,278* 4,694* 7,811* 8,122* 1,775*   TROPONIN T ng/mL  --   --   --   --   --  <0.010     Results from last 7 days   Lab Units 09/13/22 0129   TRIGLYCERIDES mg/dL 165*     Cultures:  Lab Results   Component Value Date    COLORU Yellow 09/08/2022    CLARITYU Cloudy (A) 09/08/2022    PHUR 5.5 09/08/2022    GLUCOSEU 500 mg/dL (2+) (A) 09/08/2022    KETONESU 40 mg/dL (2+) (A) 09/08/2022    BLOODU Large (3+) (A) 09/08/2022    NITRITEU Negative 09/08/2022    LEUKOCYTESUR Negative 09/08/2022    BILIRUBINUR Negative 09/08/2022    UROBILINOGEN 0.2 E.U./dL 09/08/2022    RBCUA 0-2 09/08/2022    WBCUA 0-2 09/08/2022    BACTERIA None Seen 09/08/2022     Microbiology Results (last 10 days)     Procedure Component Value - Date/Time    Respiratory Culture - Lavage, Alveoli [[id number]]  (Abnormal)  (Susceptibility) Collected: 09/12/22 1531    Lab Status: Final result Specimen: Lavage from Alveoli Updated: 09/14/22 0939     Respiratory Culture Light growth (2+) Staphylococcus aureus, MRSA     Comment: Methicillin resistant Staphylococcus aureus, Patient may be an isolation risk.         No Normal Respiratory  [Normal Oral Mucosa] : normal oral mucosa Hilary     Gram Stain Moderate (3+) WBCs seen      Rare (1+) Epithelial cells seen      No organisms seen    Susceptibility      Staphylococcus aureus, MRSA      ANIL      Clindamycin Susceptible      Inducible Clindamycin Resistance Negative      Linezolid Susceptible      Oxacillin Resistant     Tetracycline Susceptible      Trimethoprim + Sulfamethoxazole Susceptible      Vancomycin Susceptible                       Susceptibility Comments     Staphylococcus aureus, MRSA    This isolate does not demonstrate inducible clindamycin resistance in vitro.               Blood Culture - Blood, Arm, Right [[id number]]  (Normal) Collected: 09/10/22 1409    Lab Status: Preliminary result Specimen: Blood from Arm, Right Updated: 09/13/22 1547     Blood Culture No growth at 3 days    Blood Culture - Blood, Arm, Left [[id number]]  (Normal) Collected: 09/10/22 1409    Lab Status: Preliminary result Specimen: Blood from Arm, Left Updated: 09/13/22 1547     Blood Culture No growth at 3 days    MRSA Screen, PCR (Inpatient) - Swab, Nares [[id number]]  (Abnormal) Collected: 09/08/22 1027    Lab Status: Final result Specimen: Swab from Nares Updated: 09/08/22 1314     MRSA PCR MRSA Detected    Narrative:      The negative predictive value of this diagnostic test is high and should only be used to consider de-escalating anti-MRSA therapy. A positive result may indicate colonization with MRSA and must be correlated clinically.    COVID PRE-OP / PRE-PROCEDURE SCREENING ORDER (NO ISOLATION) - Swab, Nasopharynx [[id number]]  (Normal) Collected: 09/08/22 0212    Lab Status: Final result Specimen: Swab from Nasopharynx Updated: 09/08/22 0234    Narrative:      The following orders were created for panel order COVID PRE-OP / PRE-PROCEDURE SCREENING ORDER (NO ISOLATION) - Swab, Nasopharynx.  Procedure                               Abnormality         Status                     ---------                               -----------         ------        [Neck Appearance] : the appearance of the neck was normal               COVID-19 and FLU A/B PCR...[[id number]]  Normal              Final result                 Please view results for these tests on the individual orders.    COVID-19 and FLU A/B PCR - Swab, Nasopharynx [[id number]]  (Normal) Collected: 09/08/22 0212    Lab Status: Final result Specimen: Swab from Nasopharynx Updated: 09/08/22 0234     COVID19 Not Detected     Influenza A PCR Not Detected     Influenza B PCR Not Detected    Narrative:      Fact sheet for providers: https://www.fda.gov/media/638713/download    Fact sheet for patients: https://www.fda.gov/media/747311/download    Test performed by PCR.    Blood Culture - Blood, Blood, Venous Line [[id number]]  (Abnormal) Collected: 09/08/22 0205    Lab Status: Final result Specimen: Blood, Venous Line Updated: 09/10/22 0619     Blood Culture Corynebacterium species     Isolated from Anaerobic Bottle     Gram Stain Anaerobic Bottle Gram positive bacilli    Narrative:      Probable contaminant requires clinical correlation, susceptibility not performed unless requested by physician.    Blood culture does not meet the specified criteria for PCR identification.  If pregnant, immunocompromised, or clinical concern for meningitis, call lab to run BCID for Listeria monocytogenes.    Blood Culture ID, PCR - Blood, Blood, Venous Line [[id number]]  (Normal) Collected: 09/08/22 0205    Lab Status: Final result Specimen: Blood, Venous Line Updated: 09/09/22 1801     BCID, PCR Negative by BCID PCR. Culture to Follow.    Blood Culture - Blood, Blood, Central Line [[id number]]  (Normal) Collected: 09/08/22 0129    Lab Status: Final result Specimen: Blood, Central Line Updated: 09/13/22 0147     Blood Culture No growth at 5 days          Lab Results   Component Value Date    PREGTESTUR Negative 09/08/2022     Pain Management Panel     Pain Management Panel Latest Ref Rng & Units 9/8/2022 5/25/2021    AMPHETAMINES SCREEN, URINE Negative Positive(A) Positive(A)    BARBITURATES  [Heart Rate And Rhythm] : heart rate was normal and rhythm regular SCREEN Negative Negative Negative    BENZODIAZEPINE SCREEN, URINE Negative Positive(A) Negative    BUPRENORPHINEUR Negative Negative Negative    COCAINE SCREEN, URINE Negative Negative Negative    METHADONE SCREEN, URINE Negative Positive(A) Negative    METHAMPHETAMINEUR Negative Positive(A) -          I have personally looked at the labs and they are summarized above.  ----------------------------------------------------------------------------------------------------------------------  Detailed radiology reports for the last 24 hours:    Imaging Results (Last 24 Hours)     ** No results found for the last 24 hours. **          Assessment & Plan      #Acute respiratory failure 2/2 drug overdose   #Aspiration pneumonia/pneumonitis  -Self extubated 9/13, now on RA  -PAL positive for MRSA and had some thick secretions on MV but most likely aspiration. WBC not elevated, cxr improving without focal opacity and easily weaned to RA after extubation makes clinical concern for PNA lower.  -Pulm following and agree MRSA likely contaminate, placed from Vanc (started 9/13 x1 dose) back to Augmentin to complete course with last dose 9/15    #Polysubstance drug use   #Benzo dependence  #Gabapentin overdose  - On further hx, sounds as if patient overdosed originally with gabapentin in addition to opioids. Unclear if she ever had true sz and no report sz history.   - STAT EEG showed nonspecific moderate degree slowing but no evidence of epileptic activity, tele neuro seen initially  -Librium reduced to 10mg q6hr 9/14  -Cont home gabapentin to avoid withdrawal  -Last Methadone dose 8 days prior, low withdrawal risk. Can resume as outpatient  -Psych consulted and agree no active SI or acute psychotic pathology, can discharge once medically stable     #Elevated CK  - Preserved renal function  - CK: max ~8K and now consistently declining  - Renal function stable and tolerating PO intake. Off TF     #Hypoglycemia (resolved)  - Episode  [Oriented To Time, Place, And Person] : oriented to person, place, and time [Affect] : the affect was normal on 9/10, resolved after TF and now PO diet     #Hx of hepatitis C  #Mild hepatocellular transaminitis   - Ammonia level nml. No history of cirrhosis  - Family reported they believe she is s/p treatment for the hepatitis C.   - Repeat hepatitis panel still positive for hepatitis C Ab. Outpatient viral load may be warranted depending on prior treatments.   - HIV screen negative      #Iron deficiency anemia  - Baseline hgb appears to be 11-13  - Hgb at presentaiton 11.5  - No active signs of bleeding    - B12 & folate wnl, iron studies consistent w/iron def anemia  - Started daily PO iron, no ESRD or HF to justify IV iron     #Tobacco use  - Recommend cessation   - NRT ordered    #Hypokalemia  -Replace per protocol        VTE Prophylaxis:   Mechanical Order History:     None      Pharmalogical Order History:      Ordered     Dose Route Frequency Stop    09/08/22 0906  heparin (porcine) 5000 UNIT/ML injection 5,000 Units         5,000 Units SC Every 8 Hours Scheduled --                  Disposition: Floor transfer order placed. Likely ok to discharge in am    I have reviewed any copied/forwarded text or data, verified its accuracy, and updated as necessary above.    Dez Mccord MD  [facility]  09/14/22  15:03 EDT     [PERRL With Normal Accommodation] : pupils were equal in size, round, and reactive to light [Extraocular Movements] : extraocular movements were intact [Outer Ear] : the ears and nose were normal in appearance [Hearing Threshold Finger Rub Not [place]] : hearing was normal [] : the neck was supple [Apical Impulse] : the apical impulse was normal [Heart Sounds] : normal S1 and S2 [Bowel Sounds] : normal bowel sounds [Abdomen Soft] : soft [Abdomen Tenderness] : non-tender [Abnormal Walk] : normal gait [Nail Clubbing] : no clubbing  or cyanosis of the fingernails [Musculoskeletal - Swelling] : no joint swelling seen [Motor Tone] : muscle strength and tone were normal [Skin Color & Pigmentation] : normal skin color and pigmentation [Skin Turgor] : normal skin turgor [Cranial Nerves] : cranial nerves 2-12 were intact [Deep Tendon Reflexes (DTR)] : deep tendon reflexes were 2+ and symmetric [Impaired Insight] : insight and judgment were intact

## 2022-09-14 NOTE — PROGRESS NOTES
Neurology Progress Note       Chief Complaint: Seizure activity    Subjective    Subjective     Subjective:  Sedation weaning was started yesterday morning in an attempt to perform a controlled extubation however patient managed to self extubate.  On exam today patient is alert and fully oriented sitting up in bed.  She denies any complaints, tells us she wants to go home as soon as possible.  She reports a fellow inmate gave her an unknown amount of Neurontin which she consumed and suspects overdosed unintentionally.  She denies any history of seizure in the past.    Review of Systems   Constitutional: Negative.    Respiratory: Negative.    Cardiovascular: Negative.    Neurological: Negative.    Psychiatric/Behavioral: Negative.             Objective    Objective      Temp:  [98.4 °F (36.9 °C)-99.3 °F (37.4 °C)] 98.4 °F (36.9 °C)  Heart Rate:  [] 58  Resp:  [11-19] 16  BP: (104-174)/() 134/84        Neurological Exam  Mental Status  Awake and alert. Oriented to person, place, time and situation. Recent and remote memory are intact. Speech is normal. Language is fluent with no aphasia. Attention and concentration are normal. Fund of knowledge is appropriate for level of education.  Patient is intubated and sedated with Ativan, Versed, propofol, and fentanyl currently infusing making thorough neurological exam difficult.    Cranial Nerves  CN II: Visual acuity is normal. Visual fields full to confrontation.  CN III, IV, VI: Extraocular movements intact bilaterally. Normal lids and orbits bilaterally. Pupils equal round and reactive to light bilaterally.  CN V: Facial sensation is normal.  CN VII: Full and symmetric facial movement.  CN IX, X: Palate elevates symmetrically  CN XI: Shoulder shrug strength is normal.  CN XII: Tongue midline without atrophy or fasciculations.    Motor  Normal muscle bulk throughout. Normal muscle tone. No abnormal involuntary movements. Strength is 5/5 throughout all four  extremities.  No purposeful movement, no response to nailbed pressure and sedation.    Sensory  Light touch is normal in upper and lower extremities.     Coordination    No obvious dysmetria.    Gait    Not assessed.      Physical Exam  Constitutional:       General: She is awake. She is not in acute distress.     Appearance: She is obese. She is not toxic-appearing.   HENT:      Head: Normocephalic.      Mouth/Throat:      Pharynx: Oropharynx is clear.   Eyes:      General: Lids are normal.      Extraocular Movements: Extraocular movements intact.      Pupils: Pupils are equal, round, and reactive to light.   Cardiovascular:      Rate and Rhythm: Normal rate and regular rhythm.   Pulmonary:      Effort: Pulmonary effort is normal. No respiratory distress.   Skin:     General: Skin is warm and dry.   Neurological:      General: No focal deficit present.      Mental Status: She is alert.      Deep Tendon Reflexes: Strength normal.   Psychiatric:         Speech: Speech normal.         Hospital Meds:  Scheduled- amoxicillin-clavulanate, 1 tablet, Oral, Q12H  chlordiazePOXIDE, 25 mg, Per G Tube, Q6H  chlorhexidine, 15 mL, Mouth/Throat, Q12H  enoxaparin, 40 mg, Subcutaneous, Nightly  famotidine, 20 mg, Intravenous, Q12H  ferrous sulfate, 325 mg, Oral, Daily With Breakfast  gabapentin, 800 mg, Oral, TID  nicotine, 1 patch, Transdermal, Q24H  potassium chloride, 40 mEq, Oral, Q4H  senna-docusate sodium, 2 tablet, Oral, Nightly  sodium chloride, 10 mL, Intravenous, Q12H  sodium chloride, 10 mL, Intravenous, Q12H  sodium chloride, 10 mL, Intravenous, Q12H  sodium chloride, 10 mL, Intravenous, Q12H      Infusions- fentaNYL Citrate, , Last Rate: Stopped (09/13/22 1502)  norepinephrine, 0.02-0.3 mcg/kg/min, Last Rate: Stopped (09/08/22 1401)  Pharmacy to dose vancomycin,   propofol, 5-60 mcg/kg/min, Last Rate: Stopped (09/13/22 0934)  sodium chloride, 75 mL/hr, Last Rate: 75 mL/hr (09/14/22 0219)       PRNs- •  acetaminophen  •   albuterol  •  dextrose  •  dextrose  •  dextrose  •  dextrose  •  dextrose  •  glucagon (human recombinant)  •  glucagon (human recombinant)  •  LORazepam  •  magnesium sulfate **OR** magnesium sulfate **OR** magnesium sulfate  •  melatonin  •  ondansetron ODT  •  oxyCODONE  •  Pharmacy to dose vancomycin  •  polyethylene glycol  •  potassium chloride  •  potassium chloride  •  sodium chloride  •  sodium chloride  •  sodium chloride    Results Review:    I reviewed the patient's new clinical results.    Imaging Results (Last 24 Hours)     ** No results found for the last 24 hours. **        Results for orders placed during the hospital encounter of 09/07/22    Adult Transthoracic Echo Complete w/ Color, Spectral and Contrast if necessary per protocol    Interpretation Summary  · Left ventricular ejection fraction appears to be 56 - 60%. Left ventricular systolic function is normal.  · Estimated right ventricular systolic pressure from tricuspid regurgitation is normal (<35 mmHg).  · There is no evidence of pericardial effusion      No results found for: HGBA1C   Lab Results   Component Value Date    TRIG 165 (H) 09/13/2022      Lab Results   Component Value Date    WBC 7.63 09/14/2022    HGB 8.9 (L) 09/14/2022    HCT 26.7 (L) 09/14/2022    MCV 85.6 09/14/2022     09/14/2022      Lab Results   Component Value Date    GLUCOSE 108 (H) 09/14/2022    BUN 4 (L) 09/14/2022    CREATININE 0.36 (L) 09/14/2022    EGFRIFNONA 96 01/19/2022    BCR 11.1 09/14/2022    K 2.8 (L) 09/14/2022    CO2 22.1 09/14/2022    CALCIUM 8.3 (L) 09/14/2022    ALBUMIN 3.14 (L) 09/14/2022    LABIL2 1.3 (L) 10/24/2015    AST <5 09/12/2022    ALT <5 09/12/2022      Lab Results   Component Value Date    TSH 0.492 09/08/2022     Lab Results   Component Value Date    ESESUJEC10 588 09/11/2022     Lab Results   Component Value Date    FOLATE 11.60 09/11/2022             Assessment/Plan     Assessment/Plan:  36-year-old female with reports of  seizure activity by [facility] staff while incarcerated at local [facility].  UDS positive for multiple substances.  Initially given 1500 mg of IV Keppra and started on Keppra 750 mg IV every 12 hours.  No reports of seizure activity since being at the hospital. Patient was initially combative and aggressive she then became lethargic and she was intubated for airway protection.  Self extubated on 9/13/2022 while sedation being titrated down for planned controlled extubation.      #Concern for seizure activity, no confirmed seizure, likely overdose of unknown substance (per patient Neurontin)  #Polysubstance abuse, concern for accidental overdose  #Aspiration pneumonia with sepsis     -CT head did not show any acute changes  -UDS positive for amphetamines, methamphetamines, benzodiazepines, and methadone  -No need for AEDs at this point, no confirmed seizure activity, no history of seizures  -EEG showed nonspecific moderate slowing but no evidence of epilepsy      The case was discussed with the patient, and will discuss with hospitalist team.  Tele-Neurology will sign off for now, please feel free to call with any questions/concerns.  Thank you again for the consult.    José Gardiner, PEGGY  09/14/22  10:53 EDT    This was an audio and video enabled telemedicine encounter.  Dr. Hilton present for encounter via telemedicine.  Verbal consent taken      Copied portions of this note were thoroughly reviewed and updated as necessary on 9/14/2022.

## 2022-09-15 ENCOUNTER — TELEPHONE (OUTPATIENT)
Dept: MEDSURG UNIT | Facility: HOSPITAL | Age: 37
End: 2022-09-15

## 2022-09-15 LAB
BACTERIA SPEC AEROBE CULT: NORMAL
BACTERIA SPEC AEROBE CULT: NORMAL

## 2022-09-15 RX ORDER — CHLORDIAZEPOXIDE HYDROCHLORIDE 10 MG/1
10 CAPSULE, GELATIN COATED ORAL 2 TIMES DAILY PRN
Qty: 2 CAPSULE | Refills: 0 | Status: SHIPPED | OUTPATIENT
Start: 2022-09-15

## 2022-09-15 NOTE — OUTREACH NOTE
Prep Survey    Flowsheet Row Responses   [facility] facility patient discharged from? [facility]   Is LACE score < 7 ? No   Emergency Room discharge w/ pulse ox? No   Eligibility Not Eligible   What are the reasons patient is not eligible? Other  [Polysubstance abuse]   Does the patient have one of the following disease processes/diagnoses(primary or secondary)? Other   Prep survey completed? Yes          BOOKER CUMMINGS - Registered Nurse

## 2022-09-15 NOTE — DISCHARGE SUMMARY
[facility]    Patient Identification:  Name:  Lillian Ayon  Age:  36 y.o.  Sex:  female  :  1985  MRN:  [id number]  Visit Number:  [id number]    Date of Admission: 2022  Date of Discharge: 22    PCP: Jason Mathew MD    DISCHARGE DIAGNOSES    #Acute respiratory failure 2/2 drug overdose   #Aspiration pneumonia/pneumonitis  #Polysubstance drug use   #Benzo dependence  #Gabapentin overdose  #Elevated CK  #Hx of hepatitis C  #Mild hepatocellular transaminitis   #Iron deficiency anemia  #Tobacco use    CONSULTS   Pulmonology  Psychiatry  Neurology    PROCEDURES PERFORMED  Intubation    HOSPITAL COURSE  Ms. Ayon is a 36 yoF w/hx of polysubstance abuse, anxiety, arthritis, fibromyalgia, chronic hepatitis C presenting as transfer from local custodial following presumed overdose with reported possible seizure like activity in field. She was intubated shortly after arrival for altered mentation and loaded with keppra however eeg showed no seizure like activity and keppra was stopped. She self extubated on   while weaning her sedation and quickly weaned to RA after. After further history discussed she shared that she took a handful of gabapentin and other unknown pills to avoid being caught with them in the custodial and this likely led to her presentation. Had no known seizure hx and was not using benzos regularly so withdrawal seizures felt much less likely. She was also treated with abx course for aspiration PNA/pneumonitis. PAL grew MRSA but felt to be contaminate given clinical improvement on augmentin. At discharge was prescribed short course to complete 7 days total abx. She was placed on low-dose librium taper during admission given agitation and difficulty sedating safely. Given long duration of action should be ok to stop librium without outpatient taper (also on home gabapentin that will help any withdrawal sx). I discussed this with her and  have provided limited dose of 10mg librium x2 doses (4 10mg tabs total) to be taken only if she experiences significant withdrawal sx and is to contact her PCP or methadone clinic if she uses those doses. She will also folow-up with her methadone clinic to resume treatment (last dose 8 days before discharge) and has been instructed not to take more than the prescribed librium dose at any one time given risk for oversedation when used in combination with methadone.    She will need close follow up with her pcp, methadone clinic, and outpatient GI for Hep C treatment.      Medication changes:  Augmentin to complete course  Librium x2 doses for withdrawal sx if present    VITAL SIGNS:  Temp:  [98.5 °F (36.9 °C)-99 °F (37.2 °C)] 99 °F (37.2 °C)  Heart Rate:  [55-80] 78  Resp:  [16] 16  BP: (120-159)/(58-95) 120/58  SpO2:  [77 %-98 %] 98 %  on   ;   Device (Oxygen Therapy): room air    Body mass index is 39.02 kg/m².  Wt Readings from Last 3 Encounters:   09/12/22 110 kg (241 lb 9.6 oz)   06/26/22 99.8 kg (220 lb)   04/28/22 90.7 kg (200 lb)       PHYSICAL EXAM:  Physical Exam  Vitals and nursing note reviewed.   Constitutional:       Appearance: She is obese.      Comments: Awake and alert  HENT:      Head: Normocephalic and atraumatic.      Mouth/Throat:      Pharynx: Oropharynx is clear.      Comments: Coughing up opaque sputum  Eyes:      General: No scleral icterus.     Conjunctiva/sclera: Conjunctivae normal.   Cardiovascular:      Rate and Rhythm: Normal rate and regular rhythm.      Pulses: Normal pulses.      Heart sounds: No murmur heard.  Pulmonary:      Breath sounds: No wheezing.      Comments: On RA  Abdominal:      Palpations: Abdomen is soft.      Tenderness: There is no guarding.   Musculoskeletal:      Right lower leg: No edema.      Left lower leg: No edema.   Skin:     General: Skin is warm and dry.   Neurological:      Mental Status: She is alert, oriented x3     Comments: Moves all 4 extremities  spontaneously    Psychiatric:      Comments: Mood stable, interactive and appropriate    DISCHARGE DISPOSITION   Stable       Discharge Medications      New Medications      Instructions Start Date   amoxicillin-clavulanate 500-125 MG per tablet  Commonly known as: AUGMENTIN   500 mg, Oral, Every 12 Hours Scheduled      chlordiazePOXIDE 10 MG capsule  Commonly known as: LIBRIUM   10 mg, Oral, Every 6 Hours Scheduled      chlordiazePOXIDE 10 MG capsule  Commonly known as: LIBRIUM   Take 1 capsule by mouth 2 (Two) Times a Day As Needed for Withdrawal (Do not take more than prescribed amount. If having withdrwal symptoms, please contact your [facility] or PCP after taking as needed librium dose) for up to 2 doses.      ferrous sulfate 325 (65 FE) MG tablet   325 mg, Oral, Daily With Breakfast         Continue These Medications      Instructions Start Date   methadone 10 MG tablet  Commonly known as: DOLOPHINE   125 mg, Oral, Daily             Diet Instructions    Soft texture, whole foods, thin liquids.         Activity Instructions    As tolerated.         Additional Instructions for the Follow-ups that You Need to Schedule     Discharge Follow-up with PCP   As directed       Currently Documented PCP:    Jason Mathew MD    PCP Phone Number:    [phone]     Follow Up Details: ithin 1 week of discharge            Follow-up Information     Jason Mathew MD .    Specialty: Family Medicine  Why: ithin 1 week of discharge  Contact information:  [address]  [place] [id number] [phone]             Jason Mathew MD .    Specialty: Family Medicine  Contact information:  [address]  [place] [id number] [phone]                          TEST  RESULTS PENDING AT DISCHARGE  Pending Labs     Order Current Status    Blood Culture - Blood, Arm, Left Preliminary result    Blood Culture - Blood, Arm, Right Preliminary result          I will notify patient via  phone-call should above lab work result with any significant abnormal findings     CODE STATUS  Code Status and Medical Interventions:   Ordered at: 09/08/22 0830     Code Status (Patient has no pulse and is not breathing):    CPR (Attempt to Resuscitate)     Medical Interventions (Patient has pulse or is breathing):    Full Support       The ASCVD Risk score (Jamesanibal DOHERTY Jr., et al., 2013) failed to calculate for the following reasons:    The 2013 ASCVD risk score is only valid for ages 40 to 79       Dez Mccord MD  [facility]  09/15/22  11:47 EDT    Please note that this discharge summary required more than 30 minutes to complete.

## 2022-09-15 NOTE — PAYOR COMM NOTE
"[facility]  NPI:[id number]    Utilization Review  Contact: Katja Fuentes RN  Phone: [phone]  Fax:[phone]    DISCHARGE NOTIFICATION    [id number]  Raiv Seo (36 y.o. Female)             Date of Birth   1985    Social Security Number       Address   [address] [facility] [address]    Home Phone   [phone]    MRN   [id number]       Yarsanism   Denominational    Marital Status   Single                            Admission Date   9/7/22    Admission Type   Emergency    Admitting Provider   Freddie Nava MD    Attending Provider       Department, Room/Bed   [facility] [address], [address]       Discharge Date   9/14/2022    Discharge Disposition   Home or Self Care    Discharge Destination                               Attending Provider: (none)   Allergies: Adhesive Tape, Bactrim [Sulfamethoxazole-trimethoprim], Codeine, Keflex [Cephalexin]    Isolation: None   Infection: MRSA No Isolation this Admit (09/10/22), MRSA (09/14/22)   Code Status: Prior   Advance Care Planning Activity    Ht: 167.6 cm (65.98\")   Wt: 110 kg (241 lb 9.6 oz)    Admission Cmt: None   Principal Problem: None                Active Insurance as of 9/7/2022     Primary Coverage     Payor Plan Insurance Group Employer/Plan Group    [facility] BY BEBA [facility] BY BEBA [id number]     Payor Plan Address Payor Plan Phone Number Payor Plan Fax Number Effective Dates    [address] [address]   1/1/2021 - None Entered    [facility] [phone]       Subscriber Name Subscriber Birth Date Member ID       RAVI SEO 1985 [id number]                 Emergency Contacts      (Rel.) Home Phone Work Phone Mobile Phone    Mayte Seo (Sister) [phone] -- --    Yessy Seo (Mother) [phone] -- --            Discharge Summary    No notes of this type exist for this encounter.         "

## 2022-10-20 NOTE — PLAN OF CARE
Problem: Adult Inpatient Plan of Care  Goal: Plan of Care Review  Outcome: Ongoing, Progressing  Flowsheets (Taken 9/12/2022 0614)  Plan of Care Reviewed With: patient  Goal: Patient-Specific Goal (Individualized)  Outcome: Ongoing, Progressing  Goal: Absence of Hospital-Acquired Illness or Injury  Outcome: Ongoing, Progressing  Intervention: Identify and Manage Fall Risk  Flowsheets  Taken 9/12/2022 0600  Safety Promotion/Fall Prevention:   safety round/check completed   clutter free environment maintained   fall prevention program maintained  Taken 9/12/2022 0500  Safety Promotion/Fall Prevention:   safety round/check completed   clutter free environment maintained   fall prevention program maintained  Taken 9/12/2022 0400  Safety Promotion/Fall Prevention:   safety round/check completed   clutter free environment maintained   fall prevention program maintained  Taken 9/12/2022 0300  Safety Promotion/Fall Prevention:   safety round/check completed   clutter free environment maintained   fall prevention program maintained  Taken 9/12/2022 0200  Safety Promotion/Fall Prevention:   safety round/check completed   clutter free environment maintained   fall prevention program maintained  Taken 9/12/2022 0100  Safety Promotion/Fall Prevention:   safety round/check completed   clutter free environment maintained   fall prevention program maintained  Taken 9/12/2022 0000  Safety Promotion/Fall Prevention:   safety round/check completed   clutter free environment maintained   fall prevention program maintained  Taken 9/11/2022 2300  Safety Promotion/Fall Prevention:   safety round/check completed   clutter free environment maintained   fall prevention program maintained  Taken 9/11/2022 2200  Safety Promotion/Fall Prevention:   safety round/check completed   clutter free environment maintained   fall prevention program maintained  Taken 9/11/2022 2100  Safety Promotion/Fall Prevention:   safety round/check completed    Patient ID: Charli Mcgee is a 49 year old female.     Chief Complaint   Patient presents with   • Follow-up        HPI  Patient is here for multiple medical problems and medications management  bp at home yesterday 138/92  She stopped taking losartan/hctz since she started taking coreg thinking that she does not need it anymore, despite my instructions at last visit  Anxious, nervous        Review of Systems   All other systems reviewed and are negative.      ALLERGIES:  Patient has no known allergies.    Patient Active Problem List   Diagnosis   • Attention deficit hyperactivity disorder (ADHD)   • Anxiety and depression   • Primary hypertension   • Encounter for general adult medical examination w/o abnormal findings   • Acute pain of right knee   • Hemorrhoids   • Right elbow pain   • Mixed hyperlipidemia   • Right wrist pain   • NSAID long-term use       Past Medical History:   Diagnosis Date   • ADHD    • Primary hypertension        Past Surgical History:   Procedure Laterality Date   • No past surgeries         Family History   Problem Relation Age of Onset   • Hypertension Mother 40   • Cancer, Prostate Father    • COPD Father    • Patient is unaware of any medical problems Brother    • Cancer, Breast Maternal Grandmother    • Cancer, Colon Neg Hx    • Cancer, Ovarian Neg Hx        Social History     Tobacco Use   • Smoking status: Never Smoker   • Smokeless tobacco: Never Used   Vaping Use   • Vaping Use: never used   Substance Use Topics   • Alcohol use: Yes     Comment: Social   • Drug use: Never       Immunization History   Administered Date(s) Administered   • COVID-19 Moderna Vaccine 02/02/2021, 03/02/2021, 12/28/2021   • Hepatitis A - Adult 07/13/2016   • Influenza, intradermal, quadrivalent, preservative free 10/03/2017   • Influenza, quadrivalent, multi-dose 10/25/2018   • Influenza, quadrivalent, preserve-free 10/06/2020, 11/04/2021   • Influenza, seasonal, injectable, preservative free 11/22/2017    • MMR 04/13/2012   • Tdap 04/13/2016        Current Outpatient Medications   Medication Sig   • carvedilol (COREG) 3.125 MG tablet Take 1 tablet by mouth in the morning and 1 tablet in the evening. Take with meals.   • losartan-hydrochlorothiazide (HYZAAR) 100-12.5 MG per tablet Take 1 tablet by mouth daily.   • cholecalciferol (VITAMIN D) 25 mcg (1,000 units) tablet Take 25 mcg by mouth daily.   • hydroCORTisone (ANUSOL-HC) 2.5 % rectal cream Place 1 application rectally 2 times daily.     Current Facility-Administered Medications   Medication   • levonorgestrel (MIRENA) (52 MG) 20 MCG/DAY intrauterine device 52 mg        Vitals:    10/20/22 1703   BP: (!) 138/96   Pulse: 81   Temp: 97.3 °F (36.3 °C)   TempSrc: Temporal   SpO2: 97%   Weight: 80 kg (176 lb 5.9 oz)   Height: 5' 5\" (1.651 m)   LMP: 06/28/2022        Physical Exam  Vitals reviewed.   Constitutional:       Appearance: Normal appearance.   Cardiovascular:      Rate and Rhythm: Normal rate and regular rhythm.      Pulses: Normal pulses.      Heart sounds: Normal heart sounds.   Pulmonary:      Effort: Pulmonary effort is normal.      Breath sounds: Normal breath sounds.   Abdominal:      General: Bowel sounds are normal.      Palpations: Abdomen is soft.   Neurological:      Mental Status: She is alert.         Assessment   Diagnoses and associated orders for this visit:  1. Primary hypertension  Assessment & Plan:  Advised her to continue losartan/hctz one tab daily  Advised continue coreg 3.125mg one tab bid  Follow up in two weeks  2. Mixed hyperlipidemia  Assessment & Plan:  Low fat low cholesterol diet  Lab results reviewed       No orders of the defined types were placed in this encounter.         Time spent obtaining,reviewing record and history, mediations lists, exam, reviewing tests,counseling and educating patient/family/caregiver,ordering medications, tests, documenting clinical information 30 minutes.    Follow up: Return in about 2 weeks  clutter free environment maintained   fall prevention program maintained  Taken 9/11/2022 2000  Safety Promotion/Fall Prevention:   safety round/check completed   clutter free environment maintained   fall prevention program maintained  Taken 9/11/2022 1900  Safety Promotion/Fall Prevention:   safety round/check completed   clutter free environment maintained   fall prevention program maintained  Intervention: Prevent Skin Injury  Flowsheets  Taken 9/12/2022 0600  Body Position:   turned   left   side-lying  Taken 9/12/2022 0400  Body Position:   turned   right   side-lying  Taken 9/12/2022 0200  Body Position:   turned   left   side-lying  Taken 9/12/2022 0135  Skin Protection:   adhesive use limited   pulse oximeter probe site changed   tubing/devices free from skin contact  Taken 9/12/2022 0000  Body Position:   turned   right   side-lying  Taken 9/11/2022 2200  Body Position:   turned   left   side-lying  Taken 9/11/2022 2000  Body Position:   turned   right   side-lying  Taken 9/11/2022 1935  Skin Protection:   adhesive use limited   pulse oximeter probe site changed   tubing/devices free from skin contact  Intervention: Prevent and Manage VTE (Venous Thromboembolism) Risk  Flowsheets  Taken 9/12/2022 0135  Activity Management: bedrest  VTE Prevention/Management: (see MAR) other (see comments)  Range of Motion: ROM (range of motion) performed  Taken 9/11/2022 1935  Activity Management: bedrest  VTE Prevention/Management: (see MAR) other (see comments)  Range of Motion: ROM (range of motion) performed  Intervention: Prevent Infection  Flowsheets  Taken 9/12/2022 0600  Infection Prevention:   environmental surveillance performed   hand hygiene promoted   rest/sleep promoted   single patient room provided  Taken 9/12/2022 0400  Infection Prevention:   environmental surveillance performed   hand hygiene promoted   rest/sleep promoted   single patient room provided  Taken 9/12/2022 0200  Infection Prevention:    (around 11/3/2022).    Discussed medication dosage, usage, goals of therapy, and side effects.    The patient indicates understanding of these issues and agrees with the plan.    Electronically signed by:  Priscilla Rankin MD    environmental surveillance performed   hand hygiene promoted   rest/sleep promoted   single patient room provided  Taken 9/12/2022 0100  Infection Prevention:   environmental surveillance performed   hand hygiene promoted   rest/sleep promoted   single patient room provided  Taken 9/12/2022 0000  Infection Prevention:   environmental surveillance performed   hand hygiene promoted   rest/sleep promoted   single patient room provided  Taken 9/11/2022 2200  Infection Prevention:   environmental surveillance performed   hand hygiene promoted   rest/sleep promoted   single patient room provided  Taken 9/11/2022 2000  Infection Prevention:   environmental surveillance performed   hand hygiene promoted   rest/sleep promoted   single patient room provided  Goal: Optimal Comfort and Wellbeing  Outcome: Ongoing, Progressing  Intervention: Monitor Pain and Promote Comfort  Flowsheets  Taken 9/12/2022 0326  Pain Management Interventions: see MAR  Taken 9/12/2022 0135  Pain Management Interventions: see MAR  Taken 9/11/2022 1935  Pain Management Interventions: see MAR  Taken 9/11/2022 1933  Pain Management Interventions: see MAR  Intervention: Provide Person-Centered Care  Flowsheets  Taken 9/12/2022 0135  Trust Relationship/Rapport:   care explained   reassurance provided  Taken 9/11/2022 1935  Trust Relationship/Rapport:   care explained   reassurance provided  Goal: Readiness for Transition of Care  Outcome: Ongoing, Progressing     Problem: Skin Injury Risk Increased  Goal: Skin Health and Integrity  Outcome: Ongoing, Progressing  Intervention: Optimize Skin Protection  Flowsheets  Taken 9/12/2022 0600  Head of Bed (HOB) Positioning:   HOB elevated   HOB at 30-45 degrees  Taken 9/12/2022 0400  Head of Bed (HOB) Positioning:   HOB elevated   HOB at 30-45 degrees  Taken 9/12/2022 0200  Head of Bed (HOB) Positioning:   HOB elevated   HOB at 30-45 degrees  Taken 9/12/2022 0135  Pressure Reduction Techniques:   heels elevated off  bed   pressure points protected   weight shift assistance provided  Pressure Reduction Devices:   heel offloading device utilized   positioning supports utilized   pressure-redistributing mattress utilized  Skin Protection:   adhesive use limited   pulse oximeter probe site changed   tubing/devices free from skin contact  Taken 9/12/2022 0000  Head of Bed (HOB) Positioning:   HOB elevated   HOB at 30-45 degrees  Taken 9/11/2022 2200  Head of Bed (HOB) Positioning:   HOB elevated   HOB at 30-45 degrees  Taken 9/11/2022 2000  Head of Bed (HOB) Positioning:   HOB elevated   HOB at 30-45 degrees  Taken 9/11/2022 1935  Pressure Reduction Techniques:   heels elevated off bed   pressure points protected   weight shift assistance provided  Pressure Reduction Devices:   heel offloading device utilized   positioning supports utilized   pressure-redistributing mattress utilized  Skin Protection:   adhesive use limited   pulse oximeter probe site changed   tubing/devices free from skin contact     Problem: Fall Injury Risk  Goal: Absence of Fall and Fall-Related Injury  Outcome: Ongoing, Progressing  Intervention: Identify and Manage Contributors  Flowsheets  Taken 9/12/2022 0600  Medication Review/Management: medications reviewed  Taken 9/12/2022 0400  Medication Review/Management: medications reviewed  Taken 9/12/2022 0200  Medication Review/Management: medications reviewed  Taken 9/12/2022 0000  Medication Review/Management: medications reviewed  Taken 9/11/2022 2200  Medication Review/Management: medications reviewed  Taken 9/11/2022 2000  Medication Review/Management: medications reviewed  Intervention: Promote Injury-Free Environment  Flowsheets  Taken 9/12/2022 0600  Safety Promotion/Fall Prevention:   safety round/check completed   clutter free environment maintained   fall prevention program maintained  Taken 9/12/2022 0500  Safety Promotion/Fall Prevention:   safety round/check completed   clutter free environment  maintained   fall prevention program maintained  Taken 9/12/2022 0400  Safety Promotion/Fall Prevention:   safety round/check completed   clutter free environment maintained   fall prevention program maintained  Taken 9/12/2022 0300  Safety Promotion/Fall Prevention:   safety round/check completed   clutter free environment maintained   fall prevention program maintained  Taken 9/12/2022 0200  Safety Promotion/Fall Prevention:   safety round/check completed   clutter free environment maintained   fall prevention program maintained  Taken 9/12/2022 0100  Safety Promotion/Fall Prevention:   safety round/check completed   clutter free environment maintained   fall prevention program maintained  Taken 9/12/2022 0000  Safety Promotion/Fall Prevention:   safety round/check completed   clutter free environment maintained   fall prevention program maintained  Taken 9/11/2022 2300  Safety Promotion/Fall Prevention:   safety round/check completed   clutter free environment maintained   fall prevention program maintained  Taken 9/11/2022 2200  Safety Promotion/Fall Prevention:   safety round/check completed   clutter free environment maintained   fall prevention program maintained  Taken 9/11/2022 2100  Safety Promotion/Fall Prevention:   safety round/check completed   clutter free environment maintained   fall prevention program maintained  Taken 9/11/2022 2000  Safety Promotion/Fall Prevention:   safety round/check completed   clutter free environment maintained   fall prevention program maintained  Taken 9/11/2022 1900  Safety Promotion/Fall Prevention:   safety round/check completed   clutter free environment maintained   fall prevention program maintained     Problem: Adjustment to Illness (Overdose)  Goal: Optimal Coping  Outcome: Ongoing, Progressing  Intervention: Support Psychosocial Response to Acute Illness  Flowsheets (Taken 9/11/2022 0800 by Shelly Ballard RN)  Family/Support System Care: support  provided     Problem: Bleeding (Overdose)  Goal: Absence of Bleeding  Outcome: Ongoing, Progressing  Intervention: Manage Signs of Bleeding  Flowsheets  Taken 9/12/2022 0135  Bleeding Precautions: blood pressure closely monitored  Taken 9/11/2022 1935  Bleeding Precautions: blood pressure closely monitored     Problem: Dysrhythmia (Overdose)  Goal: Stable Heart Rate and Rhythm  Outcome: Ongoing, Progressing     Problem: Fluid Imbalance (Overdose)  Goal: Fluid Balance  Outcome: Ongoing, Progressing  Intervention: Monitor and Manage Fluid and Electrolyte Balance  Flowsheets  Taken 9/12/2022 0135  Fluid/Electrolyte Management: fluids provided  Taken 9/11/2022 1935  Fluid/Electrolyte Management: fluids provided     Problem: Hemodynamic Instability (Overdose)  Goal: Effective Tissue Perfusion  Outcome: Ongoing, Progressing     Problem: Neurologic Impairment (Overdose)  Goal: Optimal Neurologic Function  Outcome: Ongoing, Progressing  Intervention: Protect and Optimize Cerebral Perfusion  Flowsheets  Taken 9/12/2022 0135  Sensory Stimulation Regulation: quiet environment promoted  Cerebral Perfusion Promotion: blood pressure monitored  Taken 9/11/2022 1935  Sensory Stimulation Regulation: quiet environment promoted  Cerebral Perfusion Promotion: blood pressure monitored     Problem: Respiratory Compromise (Overdose)  Goal: Effective Oxygenation and Ventilation  Outcome: Ongoing, Progressing  Intervention: Optimize Oxygenation and Ventilation  Flowsheets  Taken 9/12/2022 0135  Cough And Deep Breathing: unable to perform  Taken 9/11/2022 1935  Airway/Ventilation Management: airway patency maintained  Cough And Deep Breathing: unable to perform     Problem: Communication Impairment (Mechanical Ventilation, Invasive)  Goal: Effective Communication  Outcome: Ongoing, Progressing  Intervention: Ensure Effective Communication  Flowsheets  Taken 9/12/2022 0135  Communication Enhancement Strategies: extra time allowed for  response  Taken 9/11/2022 1935  Communication Enhancement Strategies: extra time allowed for response     Problem: Device-Related Complication Risk (Mechanical Ventilation, Invasive)  Goal: Optimal Device Function  Outcome: Ongoing, Progressing  Intervention: Optimize Device Care and Function  Flowsheets  Taken 9/12/2022 0600  Airway Safety Measures:   manual resuscitator/mask at bedside   suction at bedside  Taken 9/12/2022 0500  Airway Safety Measures:   manual resuscitator/mask at bedside   suction at bedside  Taken 9/12/2022 0400  Airway Safety Measures:   manual resuscitator/mask at bedside   suction at bedside  Taken 9/12/2022 0200  Airway Safety Measures:   manual resuscitator/mask at bedside   suction at bedside  Taken 9/12/2022 0100  Airway Safety Measures:   manual resuscitator/mask at bedside   suction at bedside  Taken 9/12/2022 0000  Airway Safety Measures:   manual resuscitator/mask at bedside   suction at bedside  Taken 9/11/2022 2200  Airway Safety Measures:   manual resuscitator/mask at bedside   suction at bedside  Taken 9/11/2022 2000  Airway Safety Measures:   manual resuscitator/mask at bedside   suction at bedside     Problem: Inability to Wean (Mechanical Ventilation, Invasive)  Goal: Mechanical Ventilation Liberation  Outcome: Ongoing, Progressing  Intervention: Promote Extubation and Mechanical Ventilation Liberation  Flowsheets  Taken 9/12/2022 0600  Medication Review/Management: medications reviewed  Taken 9/12/2022 0400  Medication Review/Management: medications reviewed  Taken 9/12/2022 0200  Medication Review/Management: medications reviewed  Taken 9/12/2022 0135  Environmental Support: calm environment promoted  Taken 9/12/2022 0000  Medication Review/Management: medications reviewed  Taken 9/11/2022 2200  Medication Review/Management: medications reviewed  Taken 9/11/2022 2000  Medication Review/Management: medications reviewed  Taken 9/11/2022 1935  Environmental Support: calm  environment promoted     Problem: Nutrition Impairment (Mechanical Ventilation, Invasive)  Goal: Optimal Nutrition Delivery  Outcome: Ongoing, Progressing     Problem: Skin and Tissue Injury (Mechanical Ventilation, Invasive)  Goal: Absence of Device-Related Skin and Tissue Injury  Outcome: Ongoing, Progressing  Intervention: Maintain Skin and Tissue Health  Flowsheets  Taken 9/12/2022 0135  Device Skin Pressure Protection: absorbent pad utilized/changed  Taken 9/11/2022 1935  Device Skin Pressure Protection:   absorbent pad utilized/changed   pressure points protected   positioning supports utilized     Problem: Ventilator-Induced Lung Injury (Mechanical Ventilation, Invasive)  Goal: Absence of Ventilator-Induced Lung Injury  Outcome: Ongoing, Progressing  Intervention: Prevent Ventilator-Associated Pneumonia  Flowsheets  Taken 9/12/2022 0600  Head of Bed (HOB) Positioning:   HOB elevated   HOB at 30-45 degrees  Taken 9/12/2022 0400  Head of Bed (HOB) Positioning:   HOB elevated   HOB at 30-45 degrees  Oral Care:   lip/mouth moisturizer applied   swabbed with antiseptic solution  Taken 9/12/2022 0200  Head of Bed (HOB) Positioning:   HOB elevated   HOB at 30-45 degrees  Taken 9/12/2022 0135  VAP Prevention Bundle: HOB elevation maintained  Taken 9/12/2022 0000  Head of Bed (HOB) Positioning:   HOB elevated   HOB at 30-45 degrees  Oral Care:   lip/mouth moisturizer applied   swabbed with antiseptic solution  Taken 9/11/2022 2200  Head of Bed (HOB) Positioning:   HOB elevated   HOB at 30-45 degrees  Taken 9/11/2022 2000  Head of Bed (HOB) Positioning:   HOB elevated   HOB at 30-45 degrees  Oral Care:   lip/mouth moisturizer applied   swabbed with antiseptic solution   teeth brushed   tongue brushed  Taken 9/11/2022 1935  VAP Prevention Bundle: HOB elevation maintained     Problem: Restraint, Nonbehavioral (Nonviolent)  Goal: Absence of Harm or Injury  Outcome: Ongoing, Progressing  Intervention: Implement Least  Restrictive Safety Strategies  Flowsheets  Taken 9/12/2022 0600  Medical Device Protection: tubing secured  Less Restrictive Alternative:   bed alarm in use   calming techniques promoted   coping techniques promoted  De-Escalation Techniques:   increased round frequency   medication administered  Diversional Activities: television  Taken 9/12/2022 0500  Medical Device Protection: tubing secured  Taken 9/12/2022 0400  Medical Device Protection: tubing secured  Less Restrictive Alternative:   bed alarm in use   calming techniques promoted   coping techniques promoted  De-Escalation Techniques:   increased round frequency   medication administered  Diversional Activities: television  Taken 9/12/2022 0200  Less Restrictive Alternative:   bed alarm in use   calming techniques promoted   coping techniques promoted  De-Escalation Techniques:   increased round frequency   medication administered  Diversional Activities: television  Taken 9/12/2022 0135  Diversional Activities: television  Taken 9/12/2022 0100  Medical Device Protection: tubing secured  Taken 9/12/2022 0000  Medical Device Protection: tubing secured  Less Restrictive Alternative:   bed alarm in use   calming techniques promoted   coping techniques promoted  De-Escalation Techniques:   increased round frequency   medication administered  Diversional Activities: television  Taken 9/11/2022 2200  Medical Device Protection: tubing secured  Less Restrictive Alternative:   bed alarm in use   calming techniques promoted   coping techniques promoted  De-Escalation Techniques:   increased round frequency   medication administered  Diversional Activities: television  Taken 9/11/2022 2000  Medical Device Protection: tubing secured  Less Restrictive Alternative:   bed alarm in use   calming techniques promoted   coping techniques promoted  De-Escalation Techniques:   increased round frequency   medication administered  Diversional Activities: television  Taken 9/11/2022  1935  Diversional Activities: television  Intervention: Protect Dignity, Rights, and Personal Wellbeing  Flowsheets  Taken 9/12/2022 0135  Trust Relationship/Rapport:   care explained   reassurance provided  Taken 9/11/2022 1935  Trust Relationship/Rapport:   care explained   reassurance provided  Intervention: Protect Skin and Joint Integrity  Flowsheets  Taken 9/12/2022 0600  Body Position:   turned   left   side-lying  Taken 9/12/2022 0400  Body Position:   turned   right   side-lying  Taken 9/12/2022 0200  Body Position:   turned   left   side-lying  Taken 9/12/2022 0135  Range of Motion: ROM (range of motion) performed  Taken 9/12/2022 0000  Body Position:   turned   right   side-lying  Taken 9/11/2022 2200  Body Position:   turned   left   side-lying  Taken 9/11/2022 2000  Body Position:   turned   right   side-lying  Taken 9/11/2022 1935  Range of Motion: ROM (range of motion) performed  Goal: Absence of Harm or Injury  Outcome: Ongoing, Progressing  Intervention: Implement Least Restrictive Safety Strategies  Flowsheets  Taken 9/12/2022 0600  Medical Device Protection: tubing secured  Less Restrictive Alternative:   bed alarm in use   calming techniques promoted   coping techniques promoted  De-Escalation Techniques:   increased round frequency   medication administered  Diversional Activities: television  Taken 9/12/2022 0500  Medical Device Protection: tubing secured  Taken 9/12/2022 0400  Medical Device Protection: tubing secured  Less Restrictive Alternative:   bed alarm in use   calming techniques promoted   coping techniques promoted  De-Escalation Techniques:   increased round frequency   medication administered  Diversional Activities: television  Taken 9/12/2022 0200  Less Restrictive Alternative:   bed alarm in use   calming techniques promoted   coping techniques promoted  De-Escalation Techniques:   increased round frequency   medication administered  Diversional Activities: television  Taken  9/12/2022 0135  Diversional Activities: television  Taken 9/12/2022 0100  Medical Device Protection: tubing secured  Taken 9/12/2022 0000  Medical Device Protection: tubing secured  Less Restrictive Alternative:   bed alarm in use   calming techniques promoted   coping techniques promoted  De-Escalation Techniques:   increased round frequency   medication administered  Diversional Activities: television  Taken 9/11/2022 2200  Medical Device Protection: tubing secured  Less Restrictive Alternative:   bed alarm in use   calming techniques promoted   coping techniques promoted  De-Escalation Techniques:   increased round frequency   medication administered  Diversional Activities: television  Taken 9/11/2022 2000  Medical Device Protection: tubing secured  Less Restrictive Alternative:   bed alarm in use   calming techniques promoted   coping techniques promoted  De-Escalation Techniques:   increased round frequency   medication administered  Diversional Activities: television  Taken 9/11/2022 1935  Diversional Activities: television  Intervention: Protect Dignity, Rights, and Personal Wellbeing  Flowsheets  Taken 9/12/2022 0135  Trust Relationship/Rapport:   care explained   reassurance provided  Taken 9/11/2022 1935  Trust Relationship/Rapport:   care explained   reassurance provided  Intervention: Protect Skin and Joint Integrity  Flowsheets  Taken 9/12/2022 0600  Body Position:   turned   left   side-lying  Taken 9/12/2022 0400  Body Position:   turned   right   side-lying  Taken 9/12/2022 0200  Body Position:   turned   left   side-lying  Taken 9/12/2022 0135  Range of Motion: ROM (range of motion) performed  Taken 9/12/2022 0000  Body Position:   turned   right   side-lying  Taken 9/11/2022 2200  Body Position:   turned   left   side-lying  Taken 9/11/2022 2000  Body Position:   turned   right   side-lying  Taken 9/11/2022 1935  Range of Motion: ROM (range of motion) performed   Goal Outcome Evaluation:  Plan of  Care Reviewed With: patient        Progress: no change

## 2023-05-28 ENCOUNTER — HOSPITAL ENCOUNTER (EMERGENCY)
Facility: HOSPITAL | Age: 38
Discharge: HOME | End: 2023-05-28
Attending: STUDENT IN AN ORGANIZED HEALTH CARE EDUCATION/TRAINING PROGRAM | Admitting: STUDENT IN AN ORGANIZED HEALTH CARE EDUCATION/TRAINING PROGRAM

## 2023-05-28 VITALS
BODY MASS INDEX: 34.53 KG/M2 | WEIGHT: 220 LBS | DIASTOLIC BLOOD PRESSURE: 83 MMHG | HEART RATE: 101 BPM | OXYGEN SATURATION: 100 % | HEIGHT: 67 IN | TEMPERATURE: 98.4 F | RESPIRATION RATE: 18 BRPM | SYSTOLIC BLOOD PRESSURE: 133 MMHG

## 2023-05-28 DIAGNOSIS — T50.901A ACCIDENTAL DRUG INGESTION, INITIAL ENCOUNTER: Primary | ICD-10-CM

## 2023-05-28 LAB
ALBUMIN SERPL-MCNC: 4.5 G/DL (ref 3.5–5.2)
ALBUMIN/GLOB SERPL: 1.4 G/DL
ALP SERPL-CCNC: 91 U/L (ref 39–117)
ALT SERPL W P-5'-P-CCNC: 15 U/L (ref 1–33)
AMPHET+METHAMPHET UR QL: NEGATIVE
AMPHETAMINES UR QL: NEGATIVE
ANION GAP SERPL CALCULATED.3IONS-SCNC: 12.3 MMOL/L (ref 5–15)
APAP SERPL-MCNC: <5 MCG/ML (ref 0–30)
AST SERPL-CCNC: 16 U/L (ref 1–32)
BARBITURATES UR QL SCN: NEGATIVE
BASOPHILS # BLD AUTO: 0.08 10*3/MM3 (ref 0–0.2)
BASOPHILS NFR BLD AUTO: 0.9 % (ref 0–1.5)
BENZODIAZ UR QL SCN: POSITIVE
BILIRUB SERPL-MCNC: <0.2 MG/DL (ref 0–1.2)
BILIRUB UR QL STRIP: NEGATIVE
BUN SERPL-MCNC: 11 MG/DL (ref 6–20)
BUN/CREAT SERPL: 17.2 (ref 7–25)
BUPRENORPHINE SERPL-MCNC: NEGATIVE NG/ML
CALCIUM SPEC-SCNC: 9.2 MG/DL (ref 8.6–10.5)
CANNABINOIDS SERPL QL: NEGATIVE
CHLORIDE SERPL-SCNC: 104 MMOL/L (ref 98–107)
CLARITY UR: CLEAR
CO2 SERPL-SCNC: 22.7 MMOL/L (ref 22–29)
COCAINE UR QL: NEGATIVE
COLOR UR: ABNORMAL
CREAT SERPL-MCNC: 0.64 MG/DL (ref 0.57–1)
DEPRECATED RDW RBC AUTO: 45.8 FL (ref 37–54)
EGFRCR SERPLBLD CKD-EPI 2021: 118.4 ML/MIN/1.73
EOSINOPHIL # BLD AUTO: 0.17 10*3/MM3 (ref 0–0.4)
EOSINOPHIL NFR BLD AUTO: 1.8 % (ref 0.3–6.2)
ERYTHROCYTE [DISTWIDTH] IN BLOOD BY AUTOMATED COUNT: 15.1 % (ref 12.3–15.4)
GLOBULIN UR ELPH-MCNC: 3.3 GM/DL
GLUCOSE SERPL-MCNC: 118 MG/DL (ref 65–99)
GLUCOSE UR STRIP-MCNC: NEGATIVE MG/DL
HCT VFR BLD AUTO: 37.7 % (ref 34–46.6)
HGB BLD-MCNC: 11.5 G/DL (ref 12–15.9)
HGB UR QL STRIP.AUTO: NEGATIVE
HOLD SPECIMEN: NORMAL
HOLD SPECIMEN: NORMAL
IMM GRANULOCYTES # BLD AUTO: 0.02 10*3/MM3 (ref 0–0.05)
IMM GRANULOCYTES NFR BLD AUTO: 0.2 % (ref 0–0.5)
KETONES UR QL STRIP: NEGATIVE
LEUKOCYTE ESTERASE UR QL STRIP.AUTO: NEGATIVE
LYMPHOCYTES # BLD AUTO: 3.87 10*3/MM3 (ref 0.7–3.1)
LYMPHOCYTES NFR BLD AUTO: 41.7 % (ref 19.6–45.3)
MCH RBC QN AUTO: 25.4 PG (ref 26.6–33)
MCHC RBC AUTO-ENTMCNC: 30.5 G/DL (ref 31.5–35.7)
MCV RBC AUTO: 83.4 FL (ref 79–97)
METHADONE UR QL SCN: POSITIVE
MONOCYTES # BLD AUTO: 0.63 10*3/MM3 (ref 0.1–0.9)
MONOCYTES NFR BLD AUTO: 6.8 % (ref 5–12)
NEUTROPHILS NFR BLD AUTO: 4.5 10*3/MM3 (ref 1.7–7)
NEUTROPHILS NFR BLD AUTO: 48.6 % (ref 42.7–76)
NITRITE UR QL STRIP: NEGATIVE
NRBC BLD AUTO-RTO: 0 /100 WBC (ref 0–0.2)
OPIATES UR QL: NEGATIVE
OXYCODONE UR QL SCN: NEGATIVE
PCP UR QL SCN: NEGATIVE
PH UR STRIP.AUTO: 6 [PH] (ref 5–8)
PLATELET # BLD AUTO: 333 10*3/MM3 (ref 140–450)
PMV BLD AUTO: 8.3 FL (ref 6–12)
POTASSIUM SERPL-SCNC: 3.4 MMOL/L (ref 3.5–5.2)
PROPOXYPH UR QL: NEGATIVE
PROT SERPL-MCNC: 7.8 G/DL (ref 6–8.5)
PROT UR QL STRIP: NEGATIVE
QT INTERVAL: 370 MS
QTC INTERVAL: 467 MS
RBC # BLD AUTO: 4.52 10*6/MM3 (ref 3.77–5.28)
SALICYLATES SERPL-MCNC: <0.3 MG/DL
SODIUM SERPL-SCNC: 139 MMOL/L (ref 136–145)
SP GR UR STRIP: 1.03 (ref 1–1.03)
TRICYCLICS UR QL SCN: NEGATIVE
UROBILINOGEN UR QL STRIP: ABNORMAL
WBC NRBC COR # BLD: 9.27 10*3/MM3 (ref 3.4–10.8)
WHOLE BLOOD HOLD COAG: NORMAL
WHOLE BLOOD HOLD SPECIMEN: NORMAL

## 2023-05-28 PROCEDURE — 80143 DRUG ASSAY ACETAMINOPHEN: CPT | Performed by: PHYSICIAN ASSISTANT

## 2023-05-28 PROCEDURE — 81003 URINALYSIS AUTO W/O SCOPE: CPT | Performed by: PHYSICIAN ASSISTANT

## 2023-05-28 PROCEDURE — 93005 ELECTROCARDIOGRAM TRACING: CPT | Performed by: PHYSICIAN ASSISTANT

## 2023-05-28 PROCEDURE — 80306 DRUG TEST PRSMV INSTRMNT: CPT | Performed by: PHYSICIAN ASSISTANT

## 2023-05-28 PROCEDURE — 80053 COMPREHEN METABOLIC PANEL: CPT | Performed by: PHYSICIAN ASSISTANT

## 2023-05-28 PROCEDURE — 80179 DRUG ASSAY SALICYLATE: CPT | Performed by: PHYSICIAN ASSISTANT

## 2023-05-28 PROCEDURE — 99283 EMERGENCY DEPT VISIT LOW MDM: CPT

## 2023-05-28 PROCEDURE — 36415 COLL VENOUS BLD VENIPUNCTURE: CPT

## 2023-05-28 PROCEDURE — 85025 COMPLETE CBC W/AUTO DIFF WBC: CPT | Performed by: PHYSICIAN ASSISTANT

## 2023-05-28 NOTE — ED PROVIDER NOTES
Subjective   History of Present Illness  38 yo female patient presents to the ED for evaluation of accidental ingestion. Pt states that she took a pill approximately one hour ago, states that her girlfriend found it on the floor and she took it by mistake. She does states that she is on methadone and that her GF is on suboxone.  Patient states she also lives with her mom who takes a bunch of different medications. Patient states she just wants to make sure she is okay. Currently asymptomatic.      History provided by:  Patient   used: No        Review of Systems   Constitutional: Negative.    HENT: Negative.    Eyes: Negative.    Respiratory: Negative.    Cardiovascular: Negative.    Gastrointestinal: Negative.    Endocrine: Negative.    Genitourinary: Negative.    Musculoskeletal: Negative.    Skin: Negative.    Allergic/Immunologic: Negative.    Neurological: Negative.    Hematological: Negative.    Psychiatric/Behavioral: Negative.    All other systems reviewed and are negative.      Past Medical History:   Diagnosis Date   • Anxiety    • Arthritis    • Bradycardia    • Fibromyalgia    • Heart disease    • Hepatitis C    • Myalgia        Allergies   Allergen Reactions   • Adhesive Tape      Steri Strip Adhesive Allergy    • Bactrim [Sulfamethoxazole-Trimethoprim] Hives, Itching and Swelling   • Codeine Hives, Itching and Swelling   • Keflex [Cephalexin] Hives, Itching and Swelling       Past Surgical History:   Procedure Laterality Date   • CHOLECYSTECTOMY     • NERVE SURGERY Right        No family history on file.    Social History     Socioeconomic History   • Marital status: Single   Tobacco Use   • Smoking status: Every Day     Packs/day: 0.50     Years: 15.00     Pack years: 7.50     Types: Cigarettes   • Smokeless tobacco: Never   Vaping Use   • Vaping Use: Unknown   Substance and Sexual Activity   • Alcohol use: No   • Drug use: Yes     Types: Amphetamines, Methamphetamines,  Benzodiazepines   • Sexual activity: Defer           Objective   Physical Exam  Vitals and nursing note reviewed.   Constitutional:       Appearance: Normal appearance. She is normal weight.   HENT:      Head: Normocephalic and atraumatic.      Right Ear: External ear normal.      Left Ear: External ear normal.      Nose: Nose normal.      Mouth/Throat:      Mouth: Mucous membranes are moist.      Pharynx: Oropharynx is clear.   Eyes:      Extraocular Movements: Extraocular movements intact.      Conjunctiva/sclera: Conjunctivae normal.      Pupils: Pupils are equal, round, and reactive to light.   Cardiovascular:      Rate and Rhythm: Normal rate and regular rhythm.      Pulses: Normal pulses.      Heart sounds: Normal heart sounds.   Pulmonary:      Effort: Pulmonary effort is normal.      Breath sounds: Normal breath sounds.   Abdominal:      General: Abdomen is flat. Bowel sounds are normal.      Palpations: Abdomen is soft.   Musculoskeletal:         General: Normal range of motion.      Cervical back: Normal range of motion and neck supple.   Skin:     General: Skin is warm and dry.      Capillary Refill: Capillary refill takes less than 2 seconds.   Neurological:      General: No focal deficit present.      Mental Status: She is alert and oriented to person, place, and time. Mental status is at baseline.   Psychiatric:         Mood and Affect: Mood normal.         Behavior: Behavior normal.         Thought Content: Thought content normal.         Judgment: Judgment normal.         Procedures           ED Course  ED Course as of 05/28/23 1605   Sun May 28, 2023   1545 Benzodiazepine Screen, Urine(!): Positive [ML]   1545 Methadone Screen , Urine(!): Positive [ML]      ED Course User Index  [ML] Sandra Huynh PA                                           Medical Decision Making  38 yo female patient presents to the ED for evaluation of accidental ingestion. Pt states that she took a pill approximately  one hour ago, states that her girlfriend found it on the floor and she took it by mistake. She does states that she is on methadone and that her GF is on suboxone.  Patient states she also lives with her mom who takes a bunch of different medications. Patient states she just wants to make sure she is okay. Currently asymptomatic.      Accidental drug ingestion, initial encounter: complicated acute illness or injury  Amount and/or Complexity of Data Reviewed  Labs: ordered. Decision-making details documented in ED Course.  ECG/medicine tests: ordered.          Final diagnoses:   Accidental drug ingestion, initial encounter       ED Disposition  ED Disposition     ED Disposition   Discharge    Condition   Stable    Comment   --             Jason Mathew MD  [address]  [facility] [address] [place] [address] [id number] [phone]    Schedule an appointment as soon as possible for a visit in 1 day           Medication List      No changes were made to your prescriptions during this visit.          Sandra Huynh PA  05/28/23 8845

## 2023-10-09 ENCOUNTER — APPOINTMENT (OUTPATIENT)
Dept: GENERAL RADIOLOGY | Facility: HOSPITAL | Age: 38
End: 2023-10-09
Payer: COMMERCIAL

## 2023-10-09 ENCOUNTER — HOSPITAL ENCOUNTER (EMERGENCY)
Facility: HOSPITAL | Age: 38
Discharge: HOME | End: 2023-10-09
Payer: COMMERCIAL

## 2023-10-09 VITALS
WEIGHT: 200 LBS | OXYGEN SATURATION: 99 % | SYSTOLIC BLOOD PRESSURE: 124 MMHG | DIASTOLIC BLOOD PRESSURE: 78 MMHG | RESPIRATION RATE: 17 BRPM | HEIGHT: 67 IN | HEART RATE: 82 BPM | TEMPERATURE: 98.3 F | BODY MASS INDEX: 31.39 KG/M2

## 2023-10-09 DIAGNOSIS — S90.02XA CONTUSION OF LEFT ANKLE, INITIAL ENCOUNTER: Primary | ICD-10-CM

## 2023-10-09 PROCEDURE — 73590 X-RAY EXAM OF LOWER LEG: CPT

## 2023-10-09 PROCEDURE — 73630 X-RAY EXAM OF FOOT: CPT | Performed by: RADIOLOGY

## 2023-10-09 PROCEDURE — 73630 X-RAY EXAM OF FOOT: CPT

## 2023-10-09 PROCEDURE — 73610 X-RAY EXAM OF ANKLE: CPT | Performed by: RADIOLOGY

## 2023-10-09 PROCEDURE — 99283 EMERGENCY DEPT VISIT LOW MDM: CPT

## 2023-10-09 PROCEDURE — 73610 X-RAY EXAM OF ANKLE: CPT

## 2023-10-09 PROCEDURE — 73590 X-RAY EXAM OF LOWER LEG: CPT | Performed by: RADIOLOGY

## 2023-10-09 RX ADMIN — IBUPROFEN 600 MG: 200 TABLET, FILM COATED ORAL at 20:38

## 2023-10-09 NOTE — ED NOTES
MEDICAL SCREENING:    Reason for Visit: Left foot/ankle pain     Patient initially seen in triage.  The patient was advised further evaluation and diagnostic testing will be needed, some of the treatment and testing will be initiated in the lobby in order to begin the process.  The patient will be returned to the waiting area for the time being and possibly be re-assessed by a subsequent ED provider.  The patient will be brought back to the treatment area in as timely manner as possible.     Maryanne Girard, APRN  10/09/23 1922

## 2023-10-17 NOTE — ED PROVIDER NOTES
Subjective     Ankle Pain  Location:  Ankle  Ankle location:  L ankle  Pain details:     Quality:  Throbbing    Radiates to:  Does not radiate    Severity:  Moderate    Onset quality: Pt reports fall.    Timing:  Constant    Progression:  Waxing and waning  Chronicity:  New  Dislocation: no    Associated symptoms: decreased ROM and swelling    Associated symptoms: no back pain, no fatigue, no fever, no itching, no muscle weakness, no neck pain, no numbness, no stiffness and no tingling        Review of Systems   Constitutional: Negative.  Negative for fatigue and fever.   HENT: Negative.     Respiratory: Negative.     Cardiovascular: Negative.  Negative for chest pain.   Gastrointestinal: Negative.  Negative for abdominal pain.   Endocrine: Negative.    Genitourinary: Negative.  Negative for dysuria.   Musculoskeletal:  Negative for back pain, neck pain and stiffness.        Positive for R ankle pain   Skin: Negative.  Negative for itching.   Allergic/Immunologic: Negative.    Neurological: Negative.    Hematological: Negative.    Psychiatric/Behavioral: Negative.     All other systems reviewed and are negative.      Past Medical History:   Diagnosis Date    Anxiety     Arthritis     Bradycardia     Fibromyalgia     Heart disease     Hepatitis C     Myalgia        Allergies   Allergen Reactions    Adhesive Tape      Steri Strip Adhesive Allergy     Bactrim [Sulfamethoxazole-Trimethoprim] Hives, Itching and Swelling    Codeine Hives, Itching and Swelling    Keflex [Cephalexin] Hives, Itching and Swelling       Past Surgical History:   Procedure Laterality Date    CHOLECYSTECTOMY      NERVE SURGERY Right        No family history on file.    Social History     Socioeconomic History    Marital status:    Tobacco Use    Smoking status: Every Day     Packs/day: 0.50     Years: 15.00     Additional pack years: 0.00     Total pack years: 7.50     Types: Cigarettes    Smokeless tobacco: Never   Vaping Use    Vaping  Use: Unknown   Substance and Sexual Activity    Alcohol use: No    Drug use: Yes     Types: Amphetamines, Methamphetamines, Benzodiazepines    Sexual activity: Defer           Objective   Physical Exam  Vitals and nursing note reviewed.   Constitutional:       General: She is not in acute distress.     Appearance: She is well-developed. She is not diaphoretic.   HENT:      Head: Normocephalic and atraumatic.      Right Ear: External ear normal.      Left Ear: External ear normal.      Nose: Nose normal.   Eyes:      Conjunctiva/sclera: Conjunctivae normal.      Pupils: Pupils are equal, round, and reactive to light.   Neck:      Vascular: No JVD.      Trachea: No tracheal deviation.   Cardiovascular:      Rate and Rhythm: Normal rate and regular rhythm.      Heart sounds: Normal heart sounds. No murmur heard.  Pulmonary:      Effort: Pulmonary effort is normal. No respiratory distress.      Breath sounds: Normal breath sounds. No wheezing.   Abdominal:      General: Bowel sounds are normal.      Palpations: Abdomen is soft.      Tenderness: There is no abdominal tenderness.   Musculoskeletal:         General: No deformity.      Cervical back: Normal range of motion and neck supple.      Right ankle: Swelling present. Tenderness present. Decreased range of motion.   Skin:     General: Skin is warm and dry.      Coloration: Skin is not pale.      Findings: No erythema or rash.   Neurological:      Mental Status: She is alert and oriented to person, place, and time.      Cranial Nerves: No cranial nerve deficit.   Psychiatric:         Behavior: Behavior normal.         Thought Content: Thought content normal.         Procedures       XR Tibia Fibula 2 View Left   Final Result   Impression:       No acute bony process.       This report was finalized on 10/9/2023 10:27 PM by Vishnu Armstrong MD.          XR Foot 3+ View Left   Final Result   Impression:       No acute bony process.       This report was finalized on  10/9/2023 10:25 PM by Vishnu Armstrong MD.          XR Ankle 3+ View Left   Final Result   Impression:       No acute bony process.       This report was finalized on 10/9/2023 10:26 PM by Vishnu Armstrong MD.               ED Course                                           Medical Decision Making  Problems Addressed:  Contusion of left ankle, initial encounter: complicated acute illness or injury    Amount and/or Complexity of Data Reviewed  Radiology: ordered.        Final diagnoses:   Contusion of left ankle, initial encounter       ED Disposition  ED Disposition       ED Disposition   Discharge    Condition   Stable    Comment   --               Jason Mathew MD  [address]  [address] [address]  [phone]    Call in 2 days           Medication List      No changes were made to your prescriptions during this visit.            Monserrat Pham, APRN  10/16/23 2006

## 2023-12-11 ENCOUNTER — LAB (OUTPATIENT)
Dept: LAB | Facility: HOSPITAL | Age: 38
End: 2023-12-11
Payer: COMMERCIAL

## 2023-12-11 ENCOUNTER — HOSPITAL ENCOUNTER (OUTPATIENT)
Dept: RESPIRATORY THERAPY | Facility: HOSPITAL | Age: 38
Discharge: HOME | End: 2023-12-11
Payer: COMMERCIAL

## 2023-12-11 ENCOUNTER — TRANSCRIBE ORDERS (OUTPATIENT)
Dept: ADMINISTRATIVE | Facility: HOSPITAL | Age: 38
End: 2023-12-11
Payer: COMMERCIAL

## 2023-12-11 DIAGNOSIS — Z79.891 ENCOUNTER FOR LONG-TERM METHADONE USE: ICD-10-CM

## 2023-12-11 DIAGNOSIS — Z79.891 ENCOUNTER FOR LONG-TERM METHADONE USE: Primary | ICD-10-CM

## 2023-12-11 LAB
ALBUMIN SERPL-MCNC: 4.5 G/DL (ref 3.5–5.2)
ALBUMIN/GLOB SERPL: 1.5 G/DL
ALP SERPL-CCNC: 88 U/L (ref 39–117)
ALT SERPL W P-5'-P-CCNC: 18 U/L (ref 1–33)
ANION GAP SERPL CALCULATED.3IONS-SCNC: 14 MMOL/L (ref 5–15)
AST SERPL-CCNC: 27 U/L (ref 1–32)
BASOPHILS # BLD AUTO: 0.04 10*3/MM3 (ref 0–0.2)
BASOPHILS NFR BLD AUTO: 0.8 % (ref 0–1.5)
BILIRUB SERPL-MCNC: 0.2 MG/DL (ref 0–1.2)
BUN SERPL-MCNC: 10 MG/DL (ref 6–20)
BUN/CREAT SERPL: 13.9 (ref 7–25)
CALCIUM SPEC-SCNC: 9.2 MG/DL (ref 8.6–10.5)
CHLORIDE SERPL-SCNC: 100 MMOL/L (ref 98–107)
CO2 SERPL-SCNC: 23 MMOL/L (ref 22–29)
CREAT SERPL-MCNC: 0.72 MG/DL (ref 0.57–1)
DEPRECATED RDW RBC AUTO: 40.6 FL (ref 37–54)
EGFRCR SERPLBLD CKD-EPI 2021: 112 ML/MIN/1.73
EOSINOPHIL # BLD AUTO: 0.17 10*3/MM3 (ref 0–0.4)
EOSINOPHIL NFR BLD AUTO: 3.3 % (ref 0.3–6.2)
ERYTHROCYTE [DISTWIDTH] IN BLOOD BY AUTOMATED COUNT: 13.2 % (ref 12.3–15.4)
GLOBULIN UR ELPH-MCNC: 3.1 GM/DL
GLUCOSE SERPL-MCNC: 94 MG/DL (ref 65–99)
HCT VFR BLD AUTO: 40.4 % (ref 34–46.6)
HGB BLD-MCNC: 13.3 G/DL (ref 12–15.9)
IMM GRANULOCYTES # BLD AUTO: 0.02 10*3/MM3 (ref 0–0.05)
IMM GRANULOCYTES NFR BLD AUTO: 0.4 % (ref 0–0.5)
LYMPHOCYTES # BLD AUTO: 2.25 10*3/MM3 (ref 0.7–3.1)
LYMPHOCYTES NFR BLD AUTO: 43.6 % (ref 19.6–45.3)
MCH RBC QN AUTO: 27.9 PG (ref 26.6–33)
MCHC RBC AUTO-ENTMCNC: 32.9 G/DL (ref 31.5–35.7)
MCV RBC AUTO: 84.7 FL (ref 79–97)
MONOCYTES # BLD AUTO: 0.39 10*3/MM3 (ref 0.1–0.9)
MONOCYTES NFR BLD AUTO: 7.6 % (ref 5–12)
NEUTROPHILS NFR BLD AUTO: 2.29 10*3/MM3 (ref 1.7–7)
NEUTROPHILS NFR BLD AUTO: 44.3 % (ref 42.7–76)
NRBC BLD AUTO-RTO: 0 /100 WBC (ref 0–0.2)
PLATELET # BLD AUTO: 297 10*3/MM3 (ref 140–450)
PMV BLD AUTO: 9.5 FL (ref 6–12)
POTASSIUM SERPL-SCNC: 4.2 MMOL/L (ref 3.5–5.2)
PROT SERPL-MCNC: 7.6 G/DL (ref 6–8.5)
QT INTERVAL: 430 MS
QTC INTERVAL: 430 MS
RBC # BLD AUTO: 4.77 10*6/MM3 (ref 3.77–5.28)
SODIUM SERPL-SCNC: 137 MMOL/L (ref 136–145)
WBC NRBC COR # BLD AUTO: 5.16 10*3/MM3 (ref 3.4–10.8)

## 2023-12-11 PROCEDURE — 93005 ELECTROCARDIOGRAM TRACING: CPT

## 2023-12-11 PROCEDURE — 80053 COMPREHEN METABOLIC PANEL: CPT

## 2023-12-11 PROCEDURE — 36415 COLL VENOUS BLD VENIPUNCTURE: CPT

## 2023-12-11 PROCEDURE — 85025 COMPLETE CBC W/AUTO DIFF WBC: CPT

## 2023-12-11 PROCEDURE — 86592 SYPHILIS TEST NON-TREP QUAL: CPT

## 2023-12-12 LAB — RPR SER QL: NORMAL

## 2024-05-28 PROCEDURE — 99283 EMERGENCY DEPT VISIT LOW MDM: CPT

## 2024-05-29 ENCOUNTER — HOSPITAL ENCOUNTER (EMERGENCY)
Facility: HOSPITAL | Age: 39
Discharge: HOME | End: 2024-05-29
Attending: STUDENT IN AN ORGANIZED HEALTH CARE EDUCATION/TRAINING PROGRAM | Admitting: STUDENT IN AN ORGANIZED HEALTH CARE EDUCATION/TRAINING PROGRAM
Payer: COMMERCIAL

## 2024-05-29 VITALS
HEIGHT: 66 IN | RESPIRATION RATE: 20 BRPM | HEART RATE: 77 BPM | TEMPERATURE: 97.9 F | SYSTOLIC BLOOD PRESSURE: 130 MMHG | BODY MASS INDEX: 36.96 KG/M2 | OXYGEN SATURATION: 100 % | DIASTOLIC BLOOD PRESSURE: 76 MMHG | WEIGHT: 230 LBS

## 2024-05-29 DIAGNOSIS — N39.0 ACUTE UTI: Primary | ICD-10-CM

## 2024-05-29 LAB
BACTERIA UR QL AUTO: ABNORMAL /HPF
BILIRUB UR QL STRIP: NEGATIVE
C TRACH RRNA CVX QL NAA+PROBE: NOT DETECTED
CLARITY UR: ABNORMAL
COLOR UR: YELLOW
GLUCOSE UR STRIP-MCNC: NEGATIVE MG/DL
HGB UR QL STRIP.AUTO: ABNORMAL
HOLD SPECIMEN: NORMAL
HYALINE CASTS UR QL AUTO: ABNORMAL /LPF
KETONES UR QL STRIP: NEGATIVE
LEUKOCYTE ESTERASE UR QL STRIP.AUTO: ABNORMAL
N GONORRHOEA RRNA SPEC QL NAA+PROBE: NOT DETECTED
NITRITE UR QL STRIP: NEGATIVE
PH UR STRIP.AUTO: 5.5 [PH] (ref 5–8)
PROT UR QL STRIP: ABNORMAL
RBC # UR STRIP: ABNORMAL /HPF
REF LAB TEST METHOD: ABNORMAL
SP GR UR STRIP: >1.03 (ref 1–1.03)
SQUAMOUS #/AREA URNS HPF: ABNORMAL /HPF
UROBILINOGEN UR QL STRIP: ABNORMAL
WBC # UR STRIP: ABNORMAL /HPF

## 2024-05-29 PROCEDURE — 87591 N.GONORRHOEAE DNA AMP PROB: CPT | Performed by: STUDENT IN AN ORGANIZED HEALTH CARE EDUCATION/TRAINING PROGRAM

## 2024-05-29 PROCEDURE — 87491 CHLMYD TRACH DNA AMP PROBE: CPT | Performed by: STUDENT IN AN ORGANIZED HEALTH CARE EDUCATION/TRAINING PROGRAM

## 2024-05-29 PROCEDURE — 81001 URINALYSIS AUTO W/SCOPE: CPT | Performed by: STUDENT IN AN ORGANIZED HEALTH CARE EDUCATION/TRAINING PROGRAM

## 2024-05-29 RX ORDER — AZITHROMYCIN 250 MG/1
1000 TABLET, FILM COATED ORAL ONCE
Status: COMPLETED | OUTPATIENT
Start: 2024-05-29 | End: 2024-05-29

## 2024-05-29 RX ORDER — DOXYCYCLINE 100 MG/1
100 CAPSULE ORAL 2 TIMES DAILY
Qty: 13 CAPSULE | Refills: 0 | Status: SHIPPED | OUTPATIENT
Start: 2024-05-29

## 2024-05-29 RX ORDER — DOXYCYCLINE 100 MG/1
100 CAPSULE ORAL ONCE
Status: COMPLETED | OUTPATIENT
Start: 2024-05-29 | End: 2024-05-29

## 2024-05-29 RX ADMIN — AZITHROMYCIN 1000 MG: 250 TABLET, FILM COATED ORAL at 03:09

## 2024-05-29 RX ADMIN — DOXYCYCLINE 100 MG: 100 CAPSULE ORAL at 03:09

## 2024-05-29 NOTE — ED NOTES
Patient reassessed at this time, no s/s of acute distress noted. Patient encouraged to alert staff should s/s change or worsen, Patient states verbal understanding.

## 2024-05-29 NOTE — ED PROVIDER NOTES
Subjective   History of Present Illness  38-year-old female presents to the ER with primary complaint of vaginal itching, burning, and dysuria.  Patient notes concerns for STD exposure.  No nausea or vomiting.  Vital stable.  Afebrile      Review of Systems   Genitourinary:  Positive for dysuria, vaginal discharge and vaginal pain.   All other systems reviewed and are negative.      Past Medical History:   Diagnosis Date    Anxiety     Arthritis     Bradycardia     Fibromyalgia     Heart disease     Hepatitis C     Myalgia        Allergies   Allergen Reactions    Adhesive Tape      Steri Strip Adhesive Allergy     Bactrim [Sulfamethoxazole-Trimethoprim] Hives, Itching and Swelling    Codeine Hives, Itching and Swelling    Keflex [Cephalexin] Hives, Itching and Swelling       Past Surgical History:   Procedure Laterality Date    CHOLECYSTECTOMY      NERVE SURGERY Right        No family history on file.    Social History     Socioeconomic History    Marital status:    Tobacco Use    Smoking status: Every Day     Current packs/day: 0.50     Average packs/day: 0.5 packs/day for 15.0 years (7.5 ttl pk-yrs)     Types: Cigarettes    Smokeless tobacco: Never   Vaping Use    Vaping status: Unknown   Substance and Sexual Activity    Alcohol use: No    Drug use: Yes     Types: Amphetamines, Methamphetamines, Benzodiazepines    Sexual activity: Defer           Objective   Physical Exam  Constitutional:       General: She is not in acute distress.     Appearance: Normal appearance. She is not ill-appearing.   HENT:      Head: Normocephalic and atraumatic.      Right Ear: External ear normal.      Left Ear: External ear normal.      Nose: Nose normal.      Mouth/Throat:      Mouth: Mucous membranes are moist.   Eyes:      Extraocular Movements: Extraocular movements intact.      Pupils: Pupils are equal, round, and reactive to light.   Cardiovascular:      Rate and Rhythm: Normal rate and regular rhythm.      Heart sounds:  No murmur heard.  Pulmonary:      Effort: Pulmonary effort is normal. No respiratory distress.      Breath sounds: Normal breath sounds. No wheezing.   Abdominal:      General: Bowel sounds are normal.      Palpations: Abdomen is soft.      Tenderness: There is no abdominal tenderness.   Musculoskeletal:         General: No deformity or signs of injury. Normal range of motion.      Cervical back: Normal range of motion and neck supple.   Skin:     General: Skin is warm and dry.      Findings: No erythema.   Neurological:      General: No focal deficit present.      Mental Status: She is alert and oriented to person, place, and time. Mental status is at baseline.      Cranial Nerves: No cranial nerve deficit.   Psychiatric:         Mood and Affect: Mood normal.         Behavior: Behavior normal.         Thought Content: Thought content normal.         Procedures           ED Course                                 Results for orders placed or performed during the hospital encounter of 05/29/24   Chlamydia trachomatis, Neisseria gonorrhoeae, PCR - Urine, Urine, Clean Catch    Specimen: Urine, Clean Catch   Result Value Ref Range    Chlamydia DNA by PCR Not Detected Not Detected    Neisseria gonorrhoeae by PCR Not Detected Not Detected   Urinalysis With Microscopic If Indicated (No Culture) - Urine, Clean Catch    Specimen: Urine, Clean Catch   Result Value Ref Range    Color, UA Yellow Yellow, Straw    Appearance, UA Cloudy (A) Clear    pH, UA 5.5 5.0 - 8.0    Specific Gravity, UA >1.030 (H) 1.005 - 1.030    Glucose, UA Negative Negative    Ketones, UA Negative Negative    Bilirubin, UA Negative Negative    Blood, UA Moderate (2+) (A) Negative    Protein, UA Trace (A) Negative    Leuk Esterase, UA Small (1+) (A) Negative    Nitrite, UA Negative Negative    Urobilinogen, UA 0.2 E.U./dL 0.2 - 1.0 E.U./dL   Urinalysis, Microscopic Only - Urine, Clean Catch    Specimen: Urine, Clean Catch   Result Value Ref Range    RBC,  UA 21-50 (A) None Seen, 0-2 /HPF    WBC, UA 21-50 (A) None Seen, 0-2 /HPF    Bacteria, UA None Seen None Seen /HPF    Squamous Epithelial Cells, UA 3-6 (A) None Seen, 0-2 /HPF    Hyaline Casts, UA 7-12 None Seen /LPF    Methodology Automated Microscopy    [place] Urine Culture Tube - Urine, Clean Catch    Specimen: Urine, Clean Catch   Result Value Ref Range    Extra Tube Hold for add-ons.                 Medical Decision Making  Urinalysis notes concern for UTI.  Gonorrhea and Chlamydia screening negative.  Azithromycin p.o. given during ER course.  Doxycycline given due to cephalosporin allergy.  Patient will continue doxycycline at discharge.  Work up and results were discussed throughly with the patient.  The patient will be discharged for further monitoring and management with their PCP.  Red flags, warning signs, worsening symptoms, and when to return to the ER discussed with and understood by the patient.  Patient will follow up with their PCP in a timely manner.  Vitals stable at discharge.    Problems Addressed:  Acute UTI: complicated acute illness or injury    Amount and/or Complexity of Data Reviewed  Labs:  Decision-making details documented in ED Course.    Risk  Prescription drug management.        Final diagnoses:   Acute UTI       ED Disposition  ED Disposition       ED Disposition   Discharge    Condition   Stable    Comment   --               Jason Mathew MD  [address]  [address]  [facility] [address]  [phone]    In 1 week      [facility] EMERGENCY DEPARTMENT  [address] [place] [id number] [phone]    If symptoms worsen         Medication List        New Prescriptions      doxycycline 100 MG capsule  Commonly known as: MONODOX  Take 1 capsule by mouth 2 (Two) Times a Day.               Where to Get Your Medications        These medications were sent to [facility] - [place], [place] - [address] - [phone]  - [phone] FX  [address] [facility] [address]  [facility] [address], [facility] [address]      Phone: [phone]   doxycycline 100 MG capsule            Josr Avila,   05/29/24 0431       Josr Avila DO  05/29/24 0435

## 2024-05-29 NOTE — ED NOTES
MEDICAL SCREENING:    Reason for Visit: Vaginal itching, discharge, concern for STD    Patient initially seen in triage.  The patient was advised further evaluation and diagnostic testing will be needed, some of the treatment and testing will be initiated in the lobby in order to begin the process.  The patient will be returned to the waiting area for the time being and possibly be re-assessed by a subsequent ED provider.  The patient will be brought back to the treatment area in as timely manner as possible.       Josr Avila, DO  05/29/24 0036

## 2024-06-05 ENCOUNTER — HOSPITAL ENCOUNTER (EMERGENCY)
Facility: HOSPITAL | Age: 39
Discharge: HOME | End: 2024-06-05
Attending: STUDENT IN AN ORGANIZED HEALTH CARE EDUCATION/TRAINING PROGRAM | Admitting: STUDENT IN AN ORGANIZED HEALTH CARE EDUCATION/TRAINING PROGRAM
Payer: COMMERCIAL

## 2024-06-05 VITALS
WEIGHT: 220 LBS | OXYGEN SATURATION: 97 % | DIASTOLIC BLOOD PRESSURE: 100 MMHG | HEIGHT: 66 IN | HEART RATE: 85 BPM | RESPIRATION RATE: 18 BRPM | TEMPERATURE: 98.4 F | SYSTOLIC BLOOD PRESSURE: 136 MMHG | BODY MASS INDEX: 35.36 KG/M2

## 2024-06-05 DIAGNOSIS — N89.8 VAGINAL DISCHARGE: Primary | ICD-10-CM

## 2024-06-05 PROCEDURE — 99283 EMERGENCY DEPT VISIT LOW MDM: CPT

## 2024-06-05 RX ORDER — FLUCONAZOLE 100 MG/1
200 TABLET ORAL ONCE
Status: COMPLETED | OUTPATIENT
Start: 2024-06-05 | End: 2024-06-05

## 2024-06-05 RX ORDER — NITROFURANTOIN 25; 75 MG/1; MG/1
100 CAPSULE ORAL ONCE
Status: COMPLETED | OUTPATIENT
Start: 2024-06-05 | End: 2024-06-05

## 2024-06-05 RX ORDER — METRONIDAZOLE 500 MG/1
500 TABLET ORAL 3 TIMES DAILY
Qty: 20 TABLET | Refills: 0 | Status: SHIPPED | OUTPATIENT
Start: 2024-06-05

## 2024-06-05 RX ORDER — NITROFURANTOIN 25; 75 MG/1; MG/1
100 CAPSULE ORAL 2 TIMES DAILY
Qty: 13 CAPSULE | Refills: 0 | Status: SHIPPED | OUTPATIENT
Start: 2024-06-05

## 2024-06-05 RX ORDER — METRONIDAZOLE 250 MG/1
500 TABLET ORAL ONCE
Status: COMPLETED | OUTPATIENT
Start: 2024-06-05 | End: 2024-06-05

## 2024-06-05 RX ADMIN — METRONIDAZOLE 500 MG: 250 TABLET ORAL at 23:18

## 2024-06-05 RX ADMIN — NITROFURANTOIN MONOHYDRATE/MACROCRYSTALLINE 100 MG: 25; 75 CAPSULE ORAL at 23:18

## 2024-06-05 RX ADMIN — FLUCONAZOLE 200 MG: 100 TABLET ORAL at 23:18

## 2024-06-06 NOTE — ED PROVIDER NOTES
Subjective   History of Present Illness  8-year-old female presents the ER with increasing vaginal itching and discharge.  Patient was previously seen in this ER 1 week prior with similar symptoms.  At that point she had concern for an STD.  Chlamydia and gonorrhea testing were negative.  Trichomonas cultures were ordered but no results are noted.  Patient notes no significant improvement with oral Diflucan x 1 or oral doxycycline course.  Vital stable.  Febrile      Review of Systems   Genitourinary:  Positive for vaginal discharge and vaginal pain.   All other systems reviewed and are negative.      Past Medical History:   Diagnosis Date    Anxiety     Arthritis     Bradycardia     Fibromyalgia     Heart disease     Hepatitis C     Myalgia        Allergies   Allergen Reactions    Adhesive Tape      Steri Strip Adhesive Allergy     Bactrim [Sulfamethoxazole-Trimethoprim] Hives, Itching and Swelling    Codeine Hives, Itching and Swelling    Keflex [Cephalexin] Hives, Itching and Swelling       Past Surgical History:   Procedure Laterality Date    CHOLECYSTECTOMY      NERVE SURGERY Right        No family history on file.    Social History     Socioeconomic History    Marital status:    Tobacco Use    Smoking status: Every Day     Current packs/day: 0.50     Average packs/day: 0.5 packs/day for 15.0 years (7.5 ttl pk-yrs)     Types: Cigarettes    Smokeless tobacco: Never   Vaping Use    Vaping status: Unknown   Substance and Sexual Activity    Alcohol use: No    Drug use: Yes     Types: Amphetamines, Methamphetamines, Benzodiazepines    Sexual activity: Defer           Objective   Physical Exam  Constitutional:       General: She is not in acute distress.     Appearance: Normal appearance. She is not ill-appearing.   HENT:      Head: Normocephalic and atraumatic.      Right Ear: External ear normal.      Left Ear: External ear normal.      Nose: Nose normal.      Mouth/Throat:      Mouth: Mucous membranes are  moist.   Eyes:      Extraocular Movements: Extraocular movements intact.      Pupils: Pupils are equal, round, and reactive to light.   Cardiovascular:      Rate and Rhythm: Normal rate and regular rhythm.      Heart sounds: No murmur heard.  Pulmonary:      Effort: Pulmonary effort is normal. No respiratory distress.      Breath sounds: Normal breath sounds. No wheezing.   Abdominal:      General: Bowel sounds are normal.      Palpations: Abdomen is soft.      Tenderness: There is no abdominal tenderness.   Musculoskeletal:         General: No deformity or signs of injury. Normal range of motion.      Cervical back: Normal range of motion and neck supple.   Skin:     General: Skin is warm and dry.      Findings: No erythema.   Neurological:      General: No focal deficit present.      Mental Status: She is alert and oriented to person, place, and time. Mental status is at baseline.      Cranial Nerves: No cranial nerve deficit.   Psychiatric:         Mood and Affect: Mood normal.         Behavior: Behavior normal.         Thought Content: Thought content normal.         Procedures           ED Course                                             Medical Decision Making  Reviewing patient's analysis concerns for superimposed UTI that was resistant to doxycycline.  Started on Macrobid.  I will treat empirically for trichomonas/BV with metronidazole.  Work up and results were discussed throughly with the patient.  The patient will be discharged for further monitoring and management with their PCP.  Red flags, warning signs, worsening symptoms, and when to return to the ER discussed with and understood by the patient.  Patient will follow up with their PCP in a timely manner.  Vitals stable at discharge.    Risk  Prescription drug management.        Final diagnoses:   Vaginal discharge       ED Disposition  ED Disposition       ED Disposition   Discharge    Condition   Stable    Comment   --               No follow-up  provider specified.       Medication List        New Prescriptions      metroNIDAZOLE 500 MG tablet  Commonly known as: FLAGYL  Take 1 tablet by mouth 3 (Three) Times a Day.     nitrofurantoin (macrocrystal-monohydrate) 100 MG capsule  Commonly known as: MACROBID  Take 1 capsule by mouth 2 (Two) Times a Day.               Where to Get Your Medications        These medications were sent to [facility] - [place], [place] - [address] [facility] [address] - [phone]  - [phone] FX  [address] [address]      Phone: [phone]   metroNIDAZOLE 500 MG tablet  nitrofurantoin (macrocrystal-monohydrate) 100 MG capsule            Josr Avila DO  06/05/24 8690

## 2024-07-22 ENCOUNTER — APPOINTMENT (OUTPATIENT)
Dept: CT IMAGING | Facility: HOSPITAL | Age: 39
End: 2024-07-22
Payer: COMMERCIAL

## 2024-07-22 ENCOUNTER — APPOINTMENT (OUTPATIENT)
Dept: ULTRASOUND IMAGING | Facility: HOSPITAL | Age: 39
End: 2024-07-22
Payer: COMMERCIAL

## 2024-07-22 ENCOUNTER — APPOINTMENT (OUTPATIENT)
Dept: GENERAL RADIOLOGY | Facility: HOSPITAL | Age: 39
End: 2024-07-22
Payer: COMMERCIAL

## 2024-07-22 ENCOUNTER — HOSPITAL ENCOUNTER (EMERGENCY)
Facility: HOSPITAL | Age: 39
Discharge: HOME | End: 2024-07-22
Attending: STUDENT IN AN ORGANIZED HEALTH CARE EDUCATION/TRAINING PROGRAM | Admitting: STUDENT IN AN ORGANIZED HEALTH CARE EDUCATION/TRAINING PROGRAM
Payer: COMMERCIAL

## 2024-07-22 VITALS
OXYGEN SATURATION: 94 % | HEART RATE: 68 BPM | DIASTOLIC BLOOD PRESSURE: 63 MMHG | WEIGHT: 220.46 LBS | RESPIRATION RATE: 18 BRPM | BODY MASS INDEX: 35.43 KG/M2 | HEIGHT: 66 IN | TEMPERATURE: 97.9 F | SYSTOLIC BLOOD PRESSURE: 93 MMHG

## 2024-07-22 DIAGNOSIS — J18.9 PNEUMONIA OF RIGHT UPPER LOBE DUE TO INFECTIOUS ORGANISM: Primary | ICD-10-CM

## 2024-07-22 LAB
A-A DO2: 22.5 MMHG (ref 0–300)
ALBUMIN SERPL-MCNC: 3.9 G/DL (ref 3.5–5.2)
ALBUMIN/GLOB SERPL: 1.1 G/DL
ALP SERPL-CCNC: 79 U/L (ref 39–117)
ALT SERPL W P-5'-P-CCNC: 16 U/L (ref 1–33)
ANION GAP SERPL CALCULATED.3IONS-SCNC: 12.2 MMOL/L (ref 5–15)
ARTERIAL PATENCY WRIST A: ABNORMAL
AST SERPL-CCNC: 26 U/L (ref 1–32)
ATMOSPHERIC PRESS: 727 MMHG
BASE EXCESS BLDA CALC-SCNC: 2.4 MMOL/L (ref 0–2)
BASOPHILS # BLD AUTO: 0.04 10*3/MM3 (ref 0–0.2)
BASOPHILS NFR BLD AUTO: 0.6 % (ref 0–1.5)
BDY SITE: ABNORMAL
BILIRUB SERPL-MCNC: 0.4 MG/DL (ref 0–1.2)
BUN SERPL-MCNC: 12 MG/DL (ref 6–20)
BUN/CREAT SERPL: 16.4 (ref 7–25)
CALCIUM SPEC-SCNC: 9.4 MG/DL (ref 8.6–10.5)
CHLORIDE SERPL-SCNC: 103 MMOL/L (ref 98–107)
CO2 BLDA-SCNC: 29.3 MMOL/L (ref 22–33)
CO2 SERPL-SCNC: 23.8 MMOL/L (ref 22–29)
COHGB MFR BLD: 1.5 % (ref 0–5)
CREAT SERPL-MCNC: 0.73 MG/DL (ref 0.57–1)
CRP SERPL-MCNC: 1.11 MG/DL (ref 0–0.5)
DEPRECATED RDW RBC AUTO: 42.6 FL (ref 37–54)
EGFRCR SERPLBLD CKD-EPI 2021: 109.5 ML/MIN/1.73
EOSINOPHIL # BLD AUTO: 0.21 10*3/MM3 (ref 0–0.4)
EOSINOPHIL NFR BLD AUTO: 3.2 % (ref 0.3–6.2)
ERYTHROCYTE [DISTWIDTH] IN BLOOD BY AUTOMATED COUNT: 13.7 % (ref 12.3–15.4)
GEN 5 2HR TROPONIN T REFLEX: 9 NG/L
GLOBULIN UR ELPH-MCNC: 3.4 GM/DL
GLUCOSE SERPL-MCNC: 123 MG/DL (ref 65–99)
HCG SERPL QL: NEGATIVE
HCO3 BLDA-SCNC: 27.9 MMOL/L (ref 20–26)
HCT VFR BLD AUTO: 37 % (ref 34–46.6)
HCT VFR BLD CALC: 35.3 % (ref 38–51)
HGB BLD-MCNC: 11.8 G/DL (ref 12–15.9)
HGB BLDA-MCNC: 11.5 G/DL (ref 13.5–17.5)
HOLD SPECIMEN: NORMAL
HOLD SPECIMEN: NORMAL
IMM GRANULOCYTES # BLD AUTO: 0.01 10*3/MM3 (ref 0–0.05)
IMM GRANULOCYTES NFR BLD AUTO: 0.2 % (ref 0–0.5)
INHALED O2 CONCENTRATION: 21 %
LYMPHOCYTES # BLD AUTO: 2.89 10*3/MM3 (ref 0.7–3.1)
LYMPHOCYTES NFR BLD AUTO: 44 % (ref 19.6–45.3)
Lab: ABNORMAL
MCH RBC QN AUTO: 27.8 PG (ref 26.6–33)
MCHC RBC AUTO-ENTMCNC: 31.9 G/DL (ref 31.5–35.7)
MCV RBC AUTO: 87.1 FL (ref 79–97)
METHGB BLD QL: 0.3 % (ref 0–3)
MODALITY: ABNORMAL
MONOCYTES # BLD AUTO: 0.56 10*3/MM3 (ref 0.1–0.9)
MONOCYTES NFR BLD AUTO: 8.5 % (ref 5–12)
NEUTROPHILS NFR BLD AUTO: 2.86 10*3/MM3 (ref 1.7–7)
NEUTROPHILS NFR BLD AUTO: 43.5 % (ref 42.7–76)
NRBC BLD AUTO-RTO: 0 /100 WBC (ref 0–0.2)
NT-PROBNP SERPL-MCNC: <36 PG/ML (ref 0–450)
OXYHGB MFR BLDV: 92.1 % (ref 94–99)
PCO2 BLDA: 45.8 MM HG (ref 35–45)
PCO2 TEMP ADJ BLD: ABNORMAL MM[HG]
PH BLDA: 7.39 PH UNITS (ref 7.35–7.45)
PH, TEMP CORRECTED: ABNORMAL
PLATELET # BLD AUTO: 225 10*3/MM3 (ref 140–450)
PMV BLD AUTO: 8.1 FL (ref 6–12)
PO2 BLDA: 68.7 MM HG (ref 83–108)
PO2 TEMP ADJ BLD: ABNORMAL MM[HG]
POTASSIUM SERPL-SCNC: 3.5 MMOL/L (ref 3.5–5.2)
PROT SERPL-MCNC: 7.3 G/DL (ref 6–8.5)
RBC # BLD AUTO: 4.25 10*6/MM3 (ref 3.77–5.28)
SAO2 % BLDCOA: 93.8 % (ref 94–99)
SODIUM SERPL-SCNC: 139 MMOL/L (ref 136–145)
TROPONIN T DELTA: 0 NG/L
TROPONIN T SERPL HS-MCNC: 9 NG/L
VENTILATOR MODE: ABNORMAL
WBC NRBC COR # BLD AUTO: 6.57 10*3/MM3 (ref 3.4–10.8)
WHOLE BLOOD HOLD COAG: NORMAL
WHOLE BLOOD HOLD SPECIMEN: NORMAL

## 2024-07-22 PROCEDURE — 71045 X-RAY EXAM CHEST 1 VIEW: CPT

## 2024-07-22 PROCEDURE — 71275 CT ANGIOGRAPHY CHEST: CPT

## 2024-07-22 PROCEDURE — 84703 CHORIONIC GONADOTROPIN ASSAY: CPT | Performed by: PHYSICIAN ASSISTANT

## 2024-07-22 PROCEDURE — 80053 COMPREHEN METABOLIC PANEL: CPT | Performed by: STUDENT IN AN ORGANIZED HEALTH CARE EDUCATION/TRAINING PROGRAM

## 2024-07-22 PROCEDURE — 93010 ELECTROCARDIOGRAM REPORT: CPT | Performed by: INTERNAL MEDICINE

## 2024-07-22 PROCEDURE — 82375 ASSAY CARBOXYHB QUANT: CPT

## 2024-07-22 PROCEDURE — 25010000002 METHYLPREDNISOLONE PER 125 MG: Performed by: PHYSICIAN ASSISTANT

## 2024-07-22 PROCEDURE — 36415 COLL VENOUS BLD VENIPUNCTURE: CPT

## 2024-07-22 PROCEDURE — 36600 WITHDRAWAL OF ARTERIAL BLOOD: CPT

## 2024-07-22 PROCEDURE — 83050 HGB METHEMOGLOBIN QUAN: CPT

## 2024-07-22 PROCEDURE — 96374 THER/PROPH/DIAG INJ IV PUSH: CPT

## 2024-07-22 PROCEDURE — 82805 BLOOD GASES W/O2 SATURATION: CPT

## 2024-07-22 PROCEDURE — 94799 UNLISTED PULMONARY SVC/PX: CPT

## 2024-07-22 PROCEDURE — 93970 EXTREMITY STUDY: CPT | Performed by: RADIOLOGY

## 2024-07-22 PROCEDURE — 71275 CT ANGIOGRAPHY CHEST: CPT | Performed by: RADIOLOGY

## 2024-07-22 PROCEDURE — 71045 X-RAY EXAM CHEST 1 VIEW: CPT | Performed by: RADIOLOGY

## 2024-07-22 PROCEDURE — 84484 ASSAY OF TROPONIN QUANT: CPT | Performed by: STUDENT IN AN ORGANIZED HEALTH CARE EDUCATION/TRAINING PROGRAM

## 2024-07-22 PROCEDURE — 99285 EMERGENCY DEPT VISIT HI MDM: CPT

## 2024-07-22 PROCEDURE — 25510000001 IOPAMIDOL PER 1 ML: Performed by: STUDENT IN AN ORGANIZED HEALTH CARE EDUCATION/TRAINING PROGRAM

## 2024-07-22 PROCEDURE — 86140 C-REACTIVE PROTEIN: CPT | Performed by: PHYSICIAN ASSISTANT

## 2024-07-22 PROCEDURE — 85025 COMPLETE CBC W/AUTO DIFF WBC: CPT | Performed by: STUDENT IN AN ORGANIZED HEALTH CARE EDUCATION/TRAINING PROGRAM

## 2024-07-22 PROCEDURE — 93005 ELECTROCARDIOGRAM TRACING: CPT | Performed by: STUDENT IN AN ORGANIZED HEALTH CARE EDUCATION/TRAINING PROGRAM

## 2024-07-22 PROCEDURE — 83880 ASSAY OF NATRIURETIC PEPTIDE: CPT | Performed by: PHYSICIAN ASSISTANT

## 2024-07-22 PROCEDURE — 93970 EXTREMITY STUDY: CPT

## 2024-07-22 PROCEDURE — 94640 AIRWAY INHALATION TREATMENT: CPT

## 2024-07-22 RX ORDER — METHYLPREDNISOLONE 4 MG/1
TABLET ORAL
Qty: 21 TABLET | Refills: 0 | Status: SHIPPED | OUTPATIENT
Start: 2024-07-22

## 2024-07-22 RX ORDER — AZITHROMYCIN 250 MG/1
500 TABLET, FILM COATED ORAL ONCE
Status: COMPLETED | OUTPATIENT
Start: 2024-07-22 | End: 2024-07-22

## 2024-07-22 RX ORDER — IPRATROPIUM BROMIDE AND ALBUTEROL SULFATE 2.5; .5 MG/3ML; MG/3ML
3 SOLUTION RESPIRATORY (INHALATION) ONCE
Status: COMPLETED | OUTPATIENT
Start: 2024-07-22 | End: 2024-07-22

## 2024-07-22 RX ORDER — METHYLPREDNISOLONE SODIUM SUCCINATE 125 MG/2ML
80 INJECTION, POWDER, LYOPHILIZED, FOR SOLUTION INTRAMUSCULAR; INTRAVENOUS ONCE
Status: COMPLETED | OUTPATIENT
Start: 2024-07-22 | End: 2024-07-22

## 2024-07-22 RX ORDER — AZITHROMYCIN 250 MG/1
TABLET, FILM COATED ORAL
Qty: 6 TABLET | Refills: 0 | Status: SHIPPED | OUTPATIENT
Start: 2024-07-22

## 2024-07-22 RX ORDER — SODIUM CHLORIDE 0.9 % (FLUSH) 0.9 %
10 SYRINGE (ML) INJECTION AS NEEDED
Status: DISCONTINUED | OUTPATIENT
Start: 2024-07-22 | End: 2024-07-22 | Stop reason: HOSPADM

## 2024-07-22 RX ORDER — FLUTICASONE PROPIONATE 110 UG/1
1 AEROSOL, METERED RESPIRATORY (INHALATION)
Qty: 12 G | Refills: 0 | Status: SHIPPED | OUTPATIENT
Start: 2024-07-22 | End: 2024-07-29

## 2024-07-22 RX ORDER — ASPIRIN 81 MG/1
324 TABLET, CHEWABLE ORAL ONCE
Status: COMPLETED | OUTPATIENT
Start: 2024-07-22 | End: 2024-07-22

## 2024-07-22 RX ADMIN — METHYLPREDNISOLONE SODIUM SUCCINATE 80 MG: 125 INJECTION, POWDER, FOR SOLUTION INTRAMUSCULAR; INTRAVENOUS at 16:57

## 2024-07-22 RX ADMIN — IOPAMIDOL 100 ML: 755 INJECTION, SOLUTION INTRAVENOUS at 16:16

## 2024-07-22 RX ADMIN — AZITHROMYCIN 500 MG: 250 TABLET, FILM COATED ORAL at 16:57

## 2024-07-22 RX ADMIN — IPRATROPIUM BROMIDE AND ALBUTEROL SULFATE 3 ML: .5; 2.5 SOLUTION RESPIRATORY (INHALATION) at 17:03

## 2024-07-22 RX ADMIN — ASPIRIN 324 MG: 81 TABLET, CHEWABLE ORAL at 12:44

## 2024-07-22 NOTE — ED PROVIDER NOTES
Subjective   History of Present Illness  This is a 38 year old female patient who presents to the ER with chief complaint of bilateral leg swelling. PMH significant for chronic pain on methadone, anxiety. For 1 week, the patient has had bilateral lower leg swelling that has been worsening. Today, she started having SOB associated with it. Denies cough, fever, palpitations.       Review of Systems   Constitutional: Negative.  Negative for fever.   HENT: Negative.     Respiratory:  Positive for shortness of breath. Negative for apnea, cough, choking, chest tightness, wheezing and stridor.    Cardiovascular:  Positive for leg swelling. Negative for chest pain and palpitations.   Gastrointestinal: Negative.  Negative for abdominal pain.   Endocrine: Negative.    Genitourinary: Negative.  Negative for dysuria.   Skin: Negative.    Neurological: Negative.    Psychiatric/Behavioral: Negative.     All other systems reviewed and are negative.      Past Medical History:   Diagnosis Date    Anxiety     Arthritis     Bradycardia     Fibromyalgia     Heart disease     Hepatitis C     Myalgia        Allergies   Allergen Reactions    Adhesive Tape      Steri Strip Adhesive Allergy     Bactrim [Sulfamethoxazole-Trimethoprim] Hives, Itching and Swelling    Codeine Hives, Itching and Swelling    Keflex [Cephalexin] Hives, Itching and Swelling       Past Surgical History:   Procedure Laterality Date    CHOLECYSTECTOMY      NERVE SURGERY Right        No family history on file.    Social History     Socioeconomic History    Marital status:    Tobacco Use    Smoking status: Every Day     Current packs/day: 0.50     Average packs/day: 0.5 packs/day for 15.0 years (7.5 ttl pk-yrs)     Types: Cigarettes    Smokeless tobacco: Never   Vaping Use    Vaping status: Unknown   Substance and Sexual Activity    Alcohol use: No    Drug use: Yes     Types: Amphetamines, Methamphetamines, Benzodiazepines    Sexual activity: Defer            Objective   Physical Exam  Vitals and nursing note reviewed.   Constitutional:       General: She is not in acute distress.     Appearance: She is well-developed. She is not diaphoretic.   HENT:      Head: Normocephalic and atraumatic.      Right Ear: External ear normal.      Left Ear: External ear normal.      Nose: Nose normal.   Eyes:      Conjunctiva/sclera: Conjunctivae normal.      Pupils: Pupils are equal, round, and reactive to light.   Neck:      Vascular: No JVD.      Trachea: No tracheal deviation.   Cardiovascular:      Rate and Rhythm: Normal rate and regular rhythm.      Heart sounds: Normal heart sounds. No murmur heard.  Pulmonary:      Effort: Pulmonary effort is normal. No respiratory distress.      Breath sounds: Normal breath sounds. No wheezing.   Abdominal:      General: Bowel sounds are normal.      Palpations: Abdomen is soft.      Tenderness: There is no abdominal tenderness.   Musculoskeletal:         General: No deformity. Normal range of motion.      Cervical back: Normal range of motion and neck supple.      Right lower leg: Edema present.      Left lower leg: Edema present.      Comments: 2+ pitting edema BLE    Skin:     General: Skin is warm and dry.      Coloration: Skin is not pale.      Findings: No erythema or rash.   Neurological:      Mental Status: She is alert and oriented to person, place, and time.      Cranial Nerves: No cranial nerve deficit.   Psychiatric:         Behavior: Behavior normal.         Thought Content: Thought content normal.         Procedures       Results for orders placed or performed during the hospital encounter of 07/22/24   Comprehensive Metabolic Panel    Specimen: Arm, Right; Blood   Result Value Ref Range    Glucose 123 (H) 65 - 99 mg/dL    BUN 12 6 - 20 mg/dL    Creatinine 0.73 0.57 - 1.00 mg/dL    Sodium 139 136 - 145 mmol/L    Potassium 3.5 3.5 - 5.2 mmol/L    Chloride 103 98 - 107 mmol/L    CO2 23.8 22.0 - 29.0 mmol/L    Calcium 9.4  8.6 - 10.5 mg/dL    Total Protein 7.3 6.0 - 8.5 g/dL    Albumin 3.9 3.5 - 5.2 g/dL    ALT (SGPT) 16 1 - 33 U/L    AST (SGOT) 26 1 - 32 U/L    Alkaline Phosphatase 79 39 - 117 U/L    Total Bilirubin 0.4 0.0 - 1.2 mg/dL    Globulin 3.4 gm/dL    A/G Ratio 1.1 g/dL    BUN/Creatinine Ratio 16.4 7.0 - 25.0    Anion Gap 12.2 5.0 - 15.0 mmol/L    eGFR 109.5 >60.0 mL/min/1.73   High Sensitivity Troponin T    Specimen: Arm, Right; Blood   Result Value Ref Range    HS Troponin T 9 <14 ng/L   CBC Auto Differential    Specimen: Arm, Right; Blood   Result Value Ref Range    WBC 6.57 3.40 - 10.80 10*3/mm3    RBC 4.25 3.77 - 5.28 10*6/mm3    Hemoglobin 11.8 (L) 12.0 - 15.9 g/dL    Hematocrit 37.0 34.0 - 46.6 %    MCV 87.1 79.0 - 97.0 fL    MCH 27.8 26.6 - 33.0 pg    MCHC 31.9 31.5 - 35.7 g/dL    RDW 13.7 12.3 - 15.4 %    RDW-SD 42.6 37.0 - 54.0 fl    MPV 8.1 6.0 - 12.0 fL    Platelets 225 140 - 450 10*3/mm3    Neutrophil % 43.5 42.7 - 76.0 %    Lymphocyte % 44.0 19.6 - 45.3 %    Monocyte % 8.5 5.0 - 12.0 %    Eosinophil % 3.2 0.3 - 6.2 %    Basophil % 0.6 0.0 - 1.5 %    Immature Grans % 0.2 0.0 - 0.5 %    Neutrophils, Absolute 2.86 1.70 - 7.00 10*3/mm3    Lymphocytes, Absolute 2.89 0.70 - 3.10 10*3/mm3    Monocytes, Absolute 0.56 0.10 - 0.90 10*3/mm3    Eosinophils, Absolute 0.21 0.00 - 0.40 10*3/mm3    Basophils, Absolute 0.04 0.00 - 0.20 10*3/mm3    Immature Grans, Absolute 0.01 0.00 - 0.05 10*3/mm3    nRBC 0.0 0.0 - 0.2 /100 WBC   BNP    Specimen: Arm, Right; Blood   Result Value Ref Range    proBNP <36.0 0.0 - 450.0 pg/mL   C-reactive Protein    Specimen: Arm, Right; Blood   Result Value Ref Range    C-Reactive Protein 1.11 (H) 0.00 - 0.50 mg/dL   Blood Gas, Arterial With Co-Ox    Specimen: Arterial Blood   Result Value Ref Range    Site Left Brachial     Misael's Test N/A     pH, Arterial 7.393 7.350 - 7.450 pH units    pCO2, Arterial 45.8 (H) 35.0 - 45.0 mm Hg    pO2, Arterial 68.7 (L) 83.0 - 108.0 mm Hg    HCO3, Arterial 27.9  (H) 20.0 - 26.0 mmol/L    Base Excess, Arterial 2.4 (H) 0.0 - 2.0 mmol/L    O2 Saturation, Arterial 93.8 (L) 94.0 - 99.0 %    Hemoglobin, Blood Gas 11.5 (L) 13.5 - 17.5 g/dL    Hematocrit, Blood Gas 35.3 (L) 38.0 - 51.0 %    Oxyhemoglobin 92.1 (L) 94 - 99 %    Methemoglobin 0.30 0.00 - 3.00 %    Carboxyhemoglobin 1.5 0 - 5 %    A-a DO2 22.5 0.0 - 300.0 mmHg    CO2 Content 29.3 22 - 33 mmol/L    Barometric Pressure for Blood Gas 727 mmHg    Modality Room Air     FIO2 21 %    Ventilator Mode NA     Collected by [id number]     pH, Temp Corrected      pCO2, Temperature Corrected      pO2, Temperature Corrected     High Sensitivity Troponin T 2Hr    Specimen: Arm, Right; Blood   Result Value Ref Range    HS Troponin T 9 <14 ng/L    Troponin T Delta 0 >=-4 - <+4 ng/L   hCG, Serum, Qualitative    Specimen: Arm, Right; Blood   Result Value Ref Range    HCG Qualitative Negative Negative   ECG 12 Lead ED Triage Standing Order; Chest Pain   Result Value Ref Range    QT Interval 306 ms    QTC Interval 396 ms   Green Top (Gel)   Result Value Ref Range    Extra Tube Hold for add-ons.    Lavender Top   Result Value Ref Range    Extra Tube hold for add-on    Gold Top - SST   Result Value Ref Range    Extra Tube Hold for add-ons.    Light Blue Top   Result Value Ref Range    Extra Tube Hold for add-ons.           ED Course  ED Course as of 07/22/24 1750   Mon Jul 22, 2024   1233 EKG notes sinus tachycardia.  101 bpm.  No acute ST elevation.  QTc 396.  Electronically signed by Josr Avila DO, 07/22/24, 12:33 PM EDT.   [SF]   1347 XR Chest 1 View  IMPRESSION:    Unremarkable exam. No acute cardiopulmonary findings identified.        This report was finalized on 7/22/2024 1:43 PM by Dr. Servando Liang MD   [MM]   1551 US Venous Doppler Lower Extremity Bilateral (duplex)  IMPRESSION:    Normal bilateral lower extremity duplex venous ultrasound.        This report was finalized on 7/22/2024 3:44 PM by Dr. Servando Liang MD   [MM]   5086 CT  Angiogram Chest Pulmonary Embolism  IMPRESSION:  1.  No pulmonary embolism.  2.  Some subpleural groundglass attenuation predominantly noted in the  right upper lobe that could represent viral or other infectious  etiology.        This report was finalized on 7/22/2024 4:25 PM by Dr. Servando Liang MD   [MM]   2290 Patient diagnosed with RUL pneumonia. Will be d/c home with rx for azithromycin, steroid inhaler and medrol dose packet. Will f/u with PCP in 2 days or return to ER if symptoms worsen.  [MM]      ED Course User Index  [MM] Jackie Stout PA  [SF] Josr Avila, DO                                             Medical Decision Making    This is a 38 year old female patient who presents to the ER with chief complaint of bilateral leg swelling. PMH significant for chronic pain on methadone, anxiety. For 1 week, the patient has had bilateral lower leg swelling that has been worsening. Today, she started having SOB associated with it. Denies cough, fever, palpitations.       Problems Addressed:  Pneumonia of right upper lobe due to infectious organism: complicated acute illness or injury    Amount and/or Complexity of Data Reviewed  Labs: ordered. Decision-making details documented in ED Course.  Radiology: ordered. Decision-making details documented in ED Course.  ECG/medicine tests: ordered. Decision-making details documented in ED Course.    Risk  OTC drugs.  Prescription drug management.        Final diagnoses:   Pneumonia of right upper lobe due to infectious organism       ED Disposition  ED Disposition       ED Disposition   Discharge    Condition   Stable    Comment   --               Nivia Sarabia MD  [address] [address]  [phone]    In 2 days           Medication List        New Prescriptions      azithromycin 250 MG tablet  Commonly known as: Zithromax Z-Barrett  Take 2 tablets the first day, then 1 tablet daily for 4 days.     fluticasone 110 MCG/ACT  inhaler  Commonly known as: FLOVENT HFA  Inhale 1 puff 2 (Two) Times a Day for 7 days.     methylPREDNISolone 4 MG dose pack  Commonly known as: MEDROL  Take as directed on package instructions.            Stop      doxycycline 100 MG capsule  Commonly known as: MONODOX     nitrofurantoin (macrocrystal-monohydrate) 100 MG capsule  Commonly known as: MACROBID               Where to Get Your Medications        These medications were sent to [facility] - [place], [place] - [address] [facility] [phone]  - [phone]   [address] [address]      Phone: [phone]   azithromycin 250 MG tablet  fluticasone 110 MCG/ACT inhaler  methylPREDNISolone 4 MG dose pack            Jackie Stout PA  07/22/24 1182

## 2024-07-23 LAB
QT INTERVAL: 306 MS
QTC INTERVAL: 396 MS

## 2024-10-11 ENCOUNTER — LAB (OUTPATIENT)
Dept: LAB | Facility: HOSPITAL | Age: 39
End: 2024-10-11
Payer: COMMERCIAL

## 2024-10-11 ENCOUNTER — HOSPITAL ENCOUNTER (OUTPATIENT)
Dept: RESPIRATORY THERAPY | Facility: HOSPITAL | Age: 39
Discharge: HOME | End: 2024-10-11
Payer: COMMERCIAL

## 2024-10-11 ENCOUNTER — TRANSCRIBE ORDERS (OUTPATIENT)
Dept: ADMINISTRATIVE | Facility: HOSPITAL | Age: 39
End: 2024-10-11
Payer: COMMERCIAL

## 2024-10-11 DIAGNOSIS — Z79.891 ENCOUNTER FOR LONG-TERM METHADONE USE: ICD-10-CM

## 2024-10-11 DIAGNOSIS — Z79.891 ENCOUNTER FOR LONG-TERM METHADONE USE: Primary | ICD-10-CM

## 2024-10-11 LAB
ALBUMIN SERPL-MCNC: 4.3 G/DL (ref 3.5–5.2)
ALBUMIN/GLOB SERPL: 1.2 G/DL
ALP SERPL-CCNC: 78 U/L (ref 39–117)
ALT SERPL W P-5'-P-CCNC: 13 U/L (ref 1–33)
ANION GAP SERPL CALCULATED.3IONS-SCNC: 14.9 MMOL/L (ref 5–15)
AST SERPL-CCNC: 22 U/L (ref 1–32)
BASOPHILS # BLD AUTO: 0.02 10*3/MM3 (ref 0–0.2)
BASOPHILS NFR BLD AUTO: 0.3 % (ref 0–1.5)
BILIRUB SERPL-MCNC: 0.2 MG/DL (ref 0–1.2)
BUN SERPL-MCNC: 17 MG/DL (ref 6–20)
BUN/CREAT SERPL: 25.4 (ref 7–25)
CALCIUM SPEC-SCNC: 9.5 MG/DL (ref 8.6–10.5)
CHLORIDE SERPL-SCNC: 101 MMOL/L (ref 98–107)
CO2 SERPL-SCNC: 20.1 MMOL/L (ref 22–29)
CREAT SERPL-MCNC: 0.67 MG/DL (ref 0.57–1)
DEPRECATED RDW RBC AUTO: 43.3 FL (ref 37–54)
EGFRCR SERPLBLD CKD-EPI 2021: 116.3 ML/MIN/1.73
EOSINOPHIL # BLD AUTO: 0.01 10*3/MM3 (ref 0–0.4)
EOSINOPHIL NFR BLD AUTO: 0.1 % (ref 0.3–6.2)
ERYTHROCYTE [DISTWIDTH] IN BLOOD BY AUTOMATED COUNT: 13.6 % (ref 12.3–15.4)
GLOBULIN UR ELPH-MCNC: 3.5 GM/DL
GLUCOSE SERPL-MCNC: 97 MG/DL (ref 65–99)
HCT VFR BLD AUTO: 39.5 % (ref 34–46.6)
HGB BLD-MCNC: 12.3 G/DL (ref 12–15.9)
IMM GRANULOCYTES # BLD AUTO: 0.02 10*3/MM3 (ref 0–0.05)
IMM GRANULOCYTES NFR BLD AUTO: 0.3 % (ref 0–0.5)
LYMPHOCYTES # BLD AUTO: 1.59 10*3/MM3 (ref 0.7–3.1)
LYMPHOCYTES NFR BLD AUTO: 20.8 % (ref 19.6–45.3)
MCH RBC QN AUTO: 27.4 PG (ref 26.6–33)
MCHC RBC AUTO-ENTMCNC: 31.1 G/DL (ref 31.5–35.7)
MCV RBC AUTO: 88 FL (ref 79–97)
MONOCYTES # BLD AUTO: 0.39 10*3/MM3 (ref 0.1–0.9)
MONOCYTES NFR BLD AUTO: 5.1 % (ref 5–12)
NEUTROPHILS NFR BLD AUTO: 5.61 10*3/MM3 (ref 1.7–7)
NEUTROPHILS NFR BLD AUTO: 73.4 % (ref 42.7–76)
PLATELET # BLD AUTO: 228 10*3/MM3 (ref 140–450)
PMV BLD AUTO: 10 FL (ref 6–12)
POTASSIUM SERPL-SCNC: 4.3 MMOL/L (ref 3.5–5.2)
PROT SERPL-MCNC: 7.8 G/DL (ref 6–8.5)
RBC # BLD AUTO: 4.49 10*6/MM3 (ref 3.77–5.28)
SODIUM SERPL-SCNC: 136 MMOL/L (ref 136–145)
WBC NRBC COR # BLD AUTO: 7.64 10*3/MM3 (ref 3.4–10.8)

## 2024-10-11 PROCEDURE — 86592 SYPHILIS TEST NON-TREP QUAL: CPT

## 2024-10-11 PROCEDURE — 93005 ELECTROCARDIOGRAM TRACING: CPT

## 2024-10-11 PROCEDURE — 85025 COMPLETE CBC W/AUTO DIFF WBC: CPT

## 2024-10-11 PROCEDURE — 80053 COMPREHEN METABOLIC PANEL: CPT

## 2024-10-11 PROCEDURE — 36415 COLL VENOUS BLD VENIPUNCTURE: CPT

## 2024-10-12 LAB
QT INTERVAL: 390 MS
QTC INTERVAL: 444 MS
RPR SER QL: NORMAL

## 2024-12-07 ENCOUNTER — HOSPITAL ENCOUNTER (EMERGENCY)
Facility: HOSPITAL | Age: 39
Discharge: HOME | End: 2024-12-07
Attending: EMERGENCY MEDICINE
Payer: COMMERCIAL

## 2024-12-07 VITALS
WEIGHT: 225 LBS | OXYGEN SATURATION: 98 % | HEIGHT: 66 IN | RESPIRATION RATE: 16 BRPM | TEMPERATURE: 98.5 F | DIASTOLIC BLOOD PRESSURE: 74 MMHG | BODY MASS INDEX: 36.16 KG/M2 | SYSTOLIC BLOOD PRESSURE: 139 MMHG | HEART RATE: 90 BPM

## 2024-12-07 DIAGNOSIS — S61.012A LACERATION OF LEFT THUMB WITHOUT FOREIGN BODY WITHOUT DAMAGE TO NAIL, INITIAL ENCOUNTER: Primary | ICD-10-CM

## 2024-12-07 PROCEDURE — 99282 EMERGENCY DEPT VISIT SF MDM: CPT

## 2024-12-07 RX ORDER — LIDOCAINE HYDROCHLORIDE 10 MG/ML
10 INJECTION, SOLUTION EPIDURAL; INFILTRATION; INTRACAUDAL; PERINEURAL ONCE
Status: DISCONTINUED | OUTPATIENT
Start: 2024-12-07 | End: 2024-12-07 | Stop reason: HOSPADM

## 2024-12-07 NOTE — ED PROVIDER NOTES
Subjective     History provided by:  Patient  Laceration  Location:  Finger  Finger laceration location:  L thumb  Length:  3cm  Depth:  Cutaneous  Bleeding: controlled    Time since incident:  1 hour  Laceration mechanism:  Metal edge  Pain details:     Quality:  Aching    Severity:  Mild    Timing:  Constant  Foreign body present:  No foreign bodies  Relieved by:  Nothing  Tetanus status:  Up to date  Associated symptoms: no fever        Review of Systems   Constitutional: Negative.  Negative for fever.   HENT: Negative.     Respiratory: Negative.     Cardiovascular: Negative.  Negative for chest pain.   Gastrointestinal: Negative.  Negative for abdominal pain.   Endocrine: Negative.    Genitourinary: Negative.  Negative for dysuria.   Skin:  Positive for wound.   Neurological: Negative.    Psychiatric/Behavioral: Negative.     All other systems reviewed and are negative.      Past Medical History:   Diagnosis Date    Anxiety     Arthritis     Bradycardia     Fibromyalgia     Heart disease     Hepatitis C     Myalgia        Allergies   Allergen Reactions    Adhesive Tape      Steri Strip Adhesive Allergy     Bactrim [Sulfamethoxazole-Trimethoprim] Hives, Itching and Swelling    Codeine Hives, Itching and Swelling    Keflex [Cephalexin] Hives, Itching and Swelling       Past Surgical History:   Procedure Laterality Date    CHOLECYSTECTOMY      NERVE SURGERY Right        No family history on file.    Social History     Socioeconomic History    Marital status:    Tobacco Use    Smoking status: Every Day     Current packs/day: 0.50     Average packs/day: 0.5 packs/day for 15.0 years (7.5 ttl pk-yrs)     Types: Cigarettes    Smokeless tobacco: Never   Vaping Use    Vaping status: Unknown   Substance and Sexual Activity    Alcohol use: No    Drug use: Yes     Types: Amphetamines, Methamphetamines, Benzodiazepines    Sexual activity: Defer           Objective   Physical Exam  Vitals and nursing note reviewed.    Constitutional:       General: She is not in acute distress.     Appearance: She is well-developed. She is not diaphoretic.   HENT:      Head: Normocephalic and atraumatic.      Right Ear: External ear normal.      Left Ear: External ear normal.      Nose: Nose normal.   Eyes:      Conjunctiva/sclera: Conjunctivae normal.   Neck:      Vascular: No JVD.      Trachea: No tracheal deviation.   Cardiovascular:      Rate and Rhythm: Normal rate.      Heart sounds: No murmur heard.  Pulmonary:      Effort: Pulmonary effort is normal. No respiratory distress.      Breath sounds: No wheezing.   Abdominal:      Palpations: Abdomen is soft.      Tenderness: There is no abdominal tenderness.   Musculoskeletal:         General: No deformity. Normal range of motion.      Cervical back: Normal range of motion and neck supple.   Skin:     General: Skin is warm and dry.      Coloration: Skin is not pale.      Findings: No erythema or rash.      Comments: 3 cm laceration to the extensor side of the left thumb   Neurological:      Mental Status: She is alert and oriented to person, place, and time.      Cranial Nerves: No cranial nerve deficit.   Psychiatric:         Behavior: Behavior normal.         Thought Content: Thought content normal.         Laceration Repair    Date/Time: 12/7/2024 6:25 AM    Performed by: Cortez Rosenthal II, PA  Authorized by: Junito Mims MD    Consent:     Consent obtained:  Verbal    Consent given by:  Patient  Universal protocol:     Patient identity confirmed:  Verbally with patient  Laceration details:     Location:  Finger    Finger location:  L thumb    Length (cm):  3  Pre-procedure details:     Preparation:  Patient was prepped and draped in usual sterile fashion  Exploration:     Hemostasis achieved with:  Direct pressure    Contaminated: no    Treatment:     Area cleansed with:  Shur-Clens and soap and water    Amount of cleaning:  Standard    Debridement:  None  Skin repair:     Repair  method:  Tissue adhesive and Steri-Strips    Number of Steri-Strips:  2  Approximation:     Approximation:  Close  Repair type:     Repair type:  Simple  Post-procedure details:     Dressing:  Non-adherent dressing    Procedure completion:  Tolerated well, no immediate complications             ED Course                                                       Medical Decision Making      Final diagnoses:   Laceration of left thumb without foreign body without damage to nail, initial encounter       ED Disposition  ED Disposition       ED Disposition   Discharge    Condition   Stable    Comment   --               Puneet Sarabia MD  [address] [address]  [phone]    Schedule an appointment as soon as possible for a visit            Medication List      No changes were made to your prescriptions during this visit.            Cortez Rosenthal II, PA  12/07/24 0634

## 2024-12-27 ENCOUNTER — HOSPITAL ENCOUNTER (EMERGENCY)
Facility: HOSPITAL | Age: 39
Discharge: HOME | End: 2024-12-27
Attending: EMERGENCY MEDICINE
Payer: COMMERCIAL

## 2024-12-27 ENCOUNTER — APPOINTMENT (OUTPATIENT)
Dept: GENERAL RADIOLOGY | Facility: HOSPITAL | Age: 39
End: 2024-12-27
Payer: COMMERCIAL

## 2024-12-27 VITALS
BODY MASS INDEX: 34.53 KG/M2 | HEART RATE: 87 BPM | DIASTOLIC BLOOD PRESSURE: 78 MMHG | WEIGHT: 220 LBS | SYSTOLIC BLOOD PRESSURE: 146 MMHG | RESPIRATION RATE: 14 BRPM | OXYGEN SATURATION: 96 % | TEMPERATURE: 97.9 F | HEIGHT: 67 IN

## 2024-12-27 DIAGNOSIS — S20.212A CHEST WALL CONTUSION, LEFT, INITIAL ENCOUNTER: Primary | ICD-10-CM

## 2024-12-27 PROCEDURE — 99283 EMERGENCY DEPT VISIT LOW MDM: CPT

## 2024-12-27 PROCEDURE — 71101 X-RAY EXAM UNILAT RIBS/CHEST: CPT

## 2024-12-27 NOTE — ED PROVIDER NOTES
Subjective   History of Present Illness  39-year-old female presents secondary to fall with left rib injury.  Patient states that she fell about 3 days ago.  She states that she landed on her left side on concrete.  He states that she hurts with movement along with cough and breathing.  She denies shortness of breath.  She denies any fever.  She denies any productive cough.  She has a past medical history of hepatitis C, fibromyalgia, heart disease and arthritis.  She presents by private vehicle.      Review of Systems   Constitutional: Negative.  Negative for fever.   HENT: Negative.     Respiratory: Negative.     Cardiovascular: Negative.  Negative for chest pain.   Gastrointestinal: Negative.  Negative for abdominal pain.   Endocrine: Negative.    Genitourinary: Negative.  Negative for dysuria.   Skin: Negative.    Neurological: Negative.    Psychiatric/Behavioral: Negative.     All other systems reviewed and are negative.      Past Medical History:   Diagnosis Date    Anxiety     Arthritis     Bradycardia     Fibromyalgia     Heart disease     Hepatitis C     Myalgia        Allergies   Allergen Reactions    Adhesive Tape      Steri Strip Adhesive Allergy     Bactrim [Sulfamethoxazole-Trimethoprim] Hives, Itching and Swelling    Codeine Hives, Itching and Swelling    Keflex [Cephalexin] Hives, Itching and Swelling       Past Surgical History:   Procedure Laterality Date    CHOLECYSTECTOMY      NERVE SURGERY Right        No family history on file.    Social History     Socioeconomic History    Marital status:    Tobacco Use    Smoking status: Every Day     Current packs/day: 0.50     Average packs/day: 0.5 packs/day for 15.0 years (7.5 ttl pk-yrs)     Types: Cigarettes    Smokeless tobacco: Never   Vaping Use    Vaping status: Unknown   Substance and Sexual Activity    Alcohol use: No    Drug use: Yes     Types: Amphetamines, Methamphetamines, Benzodiazepines    Sexual activity: Defer           Objective    Physical Exam  Vitals and nursing note reviewed.   Constitutional:       General: She is not in acute distress.     Appearance: She is well-developed. She is not diaphoretic.   HENT:      Head: Normocephalic and atraumatic.      Right Ear: External ear normal.      Left Ear: External ear normal.      Nose: Nose normal.   Eyes:      Conjunctiva/sclera: Conjunctivae normal.      Pupils: Pupils are equal, round, and reactive to light.   Neck:      Vascular: No JVD.      Trachea: No tracheal deviation.   Cardiovascular:      Rate and Rhythm: Normal rate and regular rhythm.      Heart sounds: Normal heart sounds. No murmur heard.  Pulmonary:      Effort: Pulmonary effort is normal. No respiratory distress.      Breath sounds: Normal breath sounds. No wheezing.   Abdominal:      General: Bowel sounds are normal.      Palpations: Abdomen is soft.      Tenderness: There is no abdominal tenderness.   Musculoskeletal:         General: No deformity. Normal range of motion.      Cervical back: Normal range of motion and neck supple.   Skin:     General: Skin is warm and dry.      Coloration: Skin is not pale.      Findings: No erythema or rash.   Neurological:      Mental Status: She is alert and oriented to person, place, and time.      Cranial Nerves: No cranial nerve deficit.   Psychiatric:         Behavior: Behavior normal.         Thought Content: Thought content normal.         Procedures           ED Course                                                       Medical Decision Making  39-year-old female presents secondary to fall with left rib injury.  Patient states that she fell about 3 days ago.  She states that she landed on her left side on concrete.  He states that she hurts with movement along with cough and breathing.  She denies shortness of breath.  She denies any fever.  She denies any productive cough.  She has a past medical history of hepatitis C, fibromyalgia, heart disease and arthritis.  She presents  by private vehicle.    Problems Addressed:  Chest wall contusion, left, initial encounter: complicated acute illness or injury    Amount and/or Complexity of Data Reviewed  Radiology: ordered. Decision-making details documented in ED Course.    Risk  Prescription drug management.        Final diagnoses:   Chest wall contusion, left, initial encounter       ED Disposition  ED Disposition       ED Disposition   Discharge    Condition   Stable    Comment   --               Puneet Sarabia MD  [address] [place] [id number] [phone]    In 1 week  if persists, As needed         Medication List        New Prescriptions      diclofenac 50 MG EC tablet  Commonly known as: VOLTAREN  Take 1 tablet by mouth 3 (Three) Times a Day As Needed (pain).            Stop      azithromycin 250 MG tablet  Commonly known as: Zithromax Z-Barrett     methylPREDNISolone 4 MG dose pack  Commonly known as: MEDROL     metroNIDAZOLE 500 MG tablet  Commonly known as: FLAGYL               Where to Get Your Medications        You can get these medications from any pharmacy    Bring a paper prescription for each of these medications  diclofenac 50 MG EC tablet            Tae oSto PA  12/27/24 0791